# Patient Record
Sex: FEMALE | Race: WHITE | NOT HISPANIC OR LATINO | Employment: FULL TIME | URBAN - METROPOLITAN AREA
[De-identification: names, ages, dates, MRNs, and addresses within clinical notes are randomized per-mention and may not be internally consistent; named-entity substitution may affect disease eponyms.]

---

## 2021-02-04 ENCOUNTER — TELEPHONE (OUTPATIENT)
Dept: NEUROLOGY | Facility: CLINIC | Age: 56
End: 2021-02-04

## 2021-02-05 ENCOUNTER — TELEPHONE (OUTPATIENT)
Dept: NEUROLOGY | Facility: CLINIC | Age: 56
End: 2021-02-05

## 2021-02-09 ENCOUNTER — TELEPHONE (OUTPATIENT)
Dept: NEUROLOGY | Facility: CLINIC | Age: 56
End: 2021-02-09

## 2021-02-10 ENCOUNTER — OFFICE VISIT (OUTPATIENT)
Dept: NEUROLOGY | Facility: CLINIC | Age: 56
End: 2021-02-10
Payer: MEDICAID

## 2021-02-10 VITALS
HEIGHT: 69 IN | RESPIRATION RATE: 20 BRPM | DIASTOLIC BLOOD PRESSURE: 92 MMHG | TEMPERATURE: 97 F | SYSTOLIC BLOOD PRESSURE: 167 MMHG | WEIGHT: 293 LBS | BODY MASS INDEX: 43.4 KG/M2 | HEART RATE: 80 BPM

## 2021-02-10 DIAGNOSIS — R20.2 PARESTHESIA AND PAIN OF BOTH UPPER EXTREMITIES: Primary | ICD-10-CM

## 2021-02-10 DIAGNOSIS — G25.0 ESSENTIAL TREMOR: ICD-10-CM

## 2021-02-10 DIAGNOSIS — M79.601 PARESTHESIA AND PAIN OF BOTH UPPER EXTREMITIES: Primary | ICD-10-CM

## 2021-02-10 DIAGNOSIS — M79.602 PARESTHESIA AND PAIN OF BOTH UPPER EXTREMITIES: Primary | ICD-10-CM

## 2021-02-10 DIAGNOSIS — G89.29 CHRONIC BILATERAL LOW BACK PAIN WITH LEFT-SIDED SCIATICA: ICD-10-CM

## 2021-02-10 DIAGNOSIS — M54.42 CHRONIC BILATERAL LOW BACK PAIN WITH LEFT-SIDED SCIATICA: ICD-10-CM

## 2021-02-10 DIAGNOSIS — Z98.890 HISTORY OF CERVICAL SPINAL SURGERY: ICD-10-CM

## 2021-02-10 DIAGNOSIS — G57.12 MERALGIA PARAESTHETICA, LEFT: ICD-10-CM

## 2021-02-10 PROCEDURE — 99215 OFFICE O/P EST HI 40 MIN: CPT | Mod: PBBFAC,PN | Performed by: NURSE PRACTITIONER

## 2021-02-10 PROCEDURE — 99999 PR PBB SHADOW E&M-EST. PATIENT-LVL V: CPT | Mod: PBBFAC,,, | Performed by: NURSE PRACTITIONER

## 2021-02-10 PROCEDURE — 99417 PROLNG OP E/M EACH 15 MIN: CPT | Mod: S$PBB,,, | Performed by: NURSE PRACTITIONER

## 2021-02-10 PROCEDURE — 99205 PR OFFICE/OUTPT VISIT, NEW, LEVL V, 60-74 MIN: ICD-10-PCS | Mod: S$PBB,,, | Performed by: NURSE PRACTITIONER

## 2021-02-10 PROCEDURE — 99417 PR PROLONGED SVC, OUTPT, W/WO DIRECT PT CONTACT,  EA ADDTL 15 MIN: ICD-10-PCS | Mod: S$PBB,,, | Performed by: NURSE PRACTITIONER

## 2021-02-10 PROCEDURE — 99999 PR PBB SHADOW E&M-EST. PATIENT-LVL V: ICD-10-PCS | Mod: PBBFAC,,, | Performed by: NURSE PRACTITIONER

## 2021-02-10 PROCEDURE — 99205 OFFICE O/P NEW HI 60 MIN: CPT | Mod: S$PBB,,, | Performed by: NURSE PRACTITIONER

## 2021-02-10 RX ORDER — IBUPROFEN 200 MG
600 TABLET ORAL EVERY 8 HOURS PRN
COMMUNITY
End: 2024-03-27

## 2021-02-10 RX ORDER — GABAPENTIN 100 MG/1
100 CAPSULE ORAL 3 TIMES DAILY
Qty: 90 CAPSULE | Refills: 11 | Status: SHIPPED | OUTPATIENT
Start: 2021-02-10 | End: 2023-01-18

## 2021-02-10 RX ORDER — LOSARTAN POTASSIUM 50 MG/1
50 TABLET ORAL
COMMUNITY
Start: 2020-11-30 | End: 2023-05-08

## 2021-02-10 RX ORDER — HYDROCHLOROTHIAZIDE 25 MG/1
25 TABLET ORAL DAILY
COMMUNITY
Start: 2020-11-30 | End: 2023-05-08

## 2021-03-11 ENCOUNTER — TELEPHONE (OUTPATIENT)
Dept: NEUROLOGY | Facility: CLINIC | Age: 56
End: 2021-03-11

## 2021-03-30 ENCOUNTER — PROCEDURE VISIT (OUTPATIENT)
Dept: NEUROLOGY | Facility: CLINIC | Age: 56
End: 2021-03-30
Payer: MEDICAID

## 2021-03-30 DIAGNOSIS — M79.601 PARESTHESIA AND PAIN OF BOTH UPPER EXTREMITIES: ICD-10-CM

## 2021-03-30 DIAGNOSIS — M79.602 PARESTHESIA AND PAIN OF BOTH UPPER EXTREMITIES: ICD-10-CM

## 2021-03-30 DIAGNOSIS — R20.2 PARESTHESIA AND PAIN OF BOTH UPPER EXTREMITIES: ICD-10-CM

## 2021-03-30 PROCEDURE — 95886 MUSC TEST DONE W/N TEST COMP: CPT | Mod: PBBFAC,PO,50 | Performed by: PSYCHIATRY & NEUROLOGY

## 2021-03-30 PROCEDURE — 95886 MUSC TEST DONE W/N TEST COMP: CPT | Mod: 26,S$PBB,, | Performed by: PSYCHIATRY & NEUROLOGY

## 2021-03-30 PROCEDURE — 95910 NRV CNDJ TEST 7-8 STUDIES: CPT | Mod: PBBFAC,PO | Performed by: PSYCHIATRY & NEUROLOGY

## 2021-03-30 PROCEDURE — 95910 PR NERVE CONDUCTION STUDY; 7-8 STUDIES: ICD-10-PCS | Mod: 26,S$PBB,, | Performed by: PSYCHIATRY & NEUROLOGY

## 2021-03-30 PROCEDURE — 95886 PR EMG COMPLETE, W/ NERVE CONDUCTION STUDIES, 5+ MUSCLES: ICD-10-PCS | Mod: 26,S$PBB,, | Performed by: PSYCHIATRY & NEUROLOGY

## 2021-03-30 PROCEDURE — 95910 NRV CNDJ TEST 7-8 STUDIES: CPT | Mod: 26,S$PBB,, | Performed by: PSYCHIATRY & NEUROLOGY

## 2023-01-09 ENCOUNTER — TELEPHONE (OUTPATIENT)
Dept: ORTHOPEDICS | Facility: CLINIC | Age: 58
End: 2023-01-09
Payer: COMMERCIAL

## 2023-01-09 NOTE — TELEPHONE ENCOUNTER
----- Message from Sina Lucas sent at 1/9/2023  3:55 PM CST -----  Regarding: wants to make an appt with [facility], I can not, call pt [phone]  Contact: pt   wants to make an appt with [facility], I can not, call pt [phone]

## 2023-01-10 DIAGNOSIS — M25.561 PAIN IN BOTH KNEES, UNSPECIFIED CHRONICITY: Primary | ICD-10-CM

## 2023-01-10 DIAGNOSIS — M25.562 PAIN IN BOTH KNEES, UNSPECIFIED CHRONICITY: Primary | ICD-10-CM

## 2023-01-18 ENCOUNTER — HOSPITAL ENCOUNTER (OUTPATIENT)
Dept: RADIOLOGY | Facility: HOSPITAL | Age: 58
Discharge: HOME | End: 2023-01-18
Attending: ORTHOPAEDIC SURGERY
Payer: COMMERCIAL

## 2023-01-18 ENCOUNTER — OFFICE VISIT (OUTPATIENT)
Dept: ORTHOPEDICS | Facility: CLINIC | Age: 58
End: 2023-01-18
Payer: MEDICAID

## 2023-01-18 VITALS — BODY MASS INDEX: 43.4 KG/M2 | HEIGHT: 69 IN | WEIGHT: 293 LBS

## 2023-01-18 DIAGNOSIS — M25.561 PAIN IN BOTH KNEES, UNSPECIFIED CHRONICITY: Primary | ICD-10-CM

## 2023-01-18 DIAGNOSIS — M25.562 PAIN IN BOTH KNEES, UNSPECIFIED CHRONICITY: Primary | ICD-10-CM

## 2023-01-18 DIAGNOSIS — M25.561 PAIN IN BOTH KNEES, UNSPECIFIED CHRONICITY: ICD-10-CM

## 2023-01-18 DIAGNOSIS — M17.10 ARTHRITIS OF KNEE: ICD-10-CM

## 2023-01-18 DIAGNOSIS — M25.562 PAIN IN BOTH KNEES, UNSPECIFIED CHRONICITY: ICD-10-CM

## 2023-01-18 PROCEDURE — 99204 OFFICE O/P NEW MOD 45 MIN: CPT | Mod: 25,S$PBB,, | Performed by: ORTHOPAEDIC SURGERY

## 2023-01-18 PROCEDURE — 99999 PR PBB SHADOW E&M-EST. PATIENT-LVL III: ICD-10-PCS | Mod: PBBFAC,,, | Performed by: ORTHOPAEDIC SURGERY

## 2023-01-18 PROCEDURE — 99999 PR PBB SHADOW E&M-EST. PATIENT-LVL III: CPT | Mod: PBBFAC,,, | Performed by: ORTHOPAEDIC SURGERY

## 2023-01-18 PROCEDURE — 99204 PR OFFICE/OUTPT VISIT, NEW, LEVL IV, 45-59 MIN: ICD-10-PCS | Mod: 25,S$PBB,, | Performed by: ORTHOPAEDIC SURGERY

## 2023-01-18 PROCEDURE — 73564 X-RAY EXAM KNEE 4 OR MORE: CPT | Mod: 26,50,, | Performed by: RADIOLOGY

## 2023-01-18 PROCEDURE — 20610 DRAIN/INJ JOINT/BURSA W/O US: CPT | Mod: 50,PBBFAC,PN | Performed by: ORTHOPAEDIC SURGERY

## 2023-01-18 PROCEDURE — 73564 X-RAY EXAM KNEE 4 OR MORE: CPT | Mod: TC,50,PO

## 2023-01-18 PROCEDURE — 73564 XR KNEE ORTHO BILAT WITH FLEXION: ICD-10-PCS | Mod: 26,50,, | Performed by: RADIOLOGY

## 2023-01-18 PROCEDURE — 20610 LARGE JOINT ASPIRATION/INJECTION: BILATERAL KNEE: ICD-10-PCS | Mod: 50,S$PBB,, | Performed by: ORTHOPAEDIC SURGERY

## 2023-01-18 PROCEDURE — 99213 OFFICE O/P EST LOW 20 MIN: CPT | Mod: PBBFAC,PN,25 | Performed by: ORTHOPAEDIC SURGERY

## 2023-01-18 RX ORDER — TRIAMCINOLONE ACETONIDE 40 MG/ML
40 INJECTION, SUSPENSION INTRA-ARTICULAR; INTRAMUSCULAR
Status: DISCONTINUED | OUTPATIENT
Start: 2023-01-18 | End: 2023-01-18 | Stop reason: HOSPADM

## 2023-01-18 RX ORDER — IBUPROFEN 800 MG/1
800 TABLET ORAL
Qty: 30 TABLET | Refills: 0 | Status: SHIPPED | OUTPATIENT
Start: 2023-01-18 | End: 2023-01-18 | Stop reason: SDUPTHER

## 2023-01-18 RX ADMIN — TRIAMCINOLONE ACETONIDE 40 MG: 40 INJECTION, SUSPENSION INTRA-ARTICULAR; INTRAMUSCULAR at 03:01

## 2023-01-18 NOTE — PROGRESS NOTES
Past Medical History:   Diagnosis Date    Chronic bilateral low back pain with left-sided sciatica 2/10/2021    Essential tremor 2/10/2021    Heart disease     Hypertension     Obesity        Past Surgical History:   Procedure Laterality Date    ESOPHAGEAL DILATION N/A 5/20/2022    Procedure: DILATION, ESOPHAGUS;  Surgeon: Jose Aguilera MD;  Location: [facility];  Service: Endoscopy;  Laterality: N/A;  Bates    ESOPHAGOGASTRODUODENOSCOPY N/A 5/20/2022    Procedure: EGD (ESOPHAGOGASTRODUODENOSCOPY);  Surgeon: Jose Aguilera MD;  Location: [facility];  Service: Endoscopy;  Laterality: N/A;       Current Outpatient Medications   Medication Sig    hydroCHLOROthiazide (HYDRODIURIL) 25 MG tablet hydrochlorothiazide 25 mg tablet    ibuprofen (ADVIL,MOTRIN) 200 MG tablet Take 600 mg by mouth every 8 (eight) hours as needed.    losartan (COZAAR) 50 MG tablet Take 50 mg by mouth.    traMADoL (ULTRAM) 50 mg tablet Take 50 mg by mouth every 6 (six) hours.     No current facility-administered medications for this visit.       Review of patient's allergies indicates:  No Known Allergies    Family History   Problem Relation Age of Onset    Heart disease Father     Tremor Father     Cancer Maternal Grandmother        Social History     Socioeconomic History    Marital status:    Tobacco Use    Smoking status: Never   Substance and Sexual Activity    Alcohol use: Never    Drug use: Never       Chief Complaint:   Chief Complaint   Patient presents with    Knee Pain    Initial Visit       History of present illness:  This is a 57-year-old female seen for chronic bilateral knee pain.  Left greater than right.  Patient has had pain for 3-4 years now.  Patient has been taking ibuprofen as well as getting periodic injections.  Patient just recently switched doctors.  She is had both cortisone as well as viscosupplementation.  Viscosupplementation does not help.  Pain is a 9/10.      Review of Systems:    Constitution:  Negative for chills, fever, and sweats.  Negative for unexplained weight loss.    HENT:  Negative for headaches and blurry vision.    Cardiovascular:Negative for chest pain or irregular heart beat. Negative for hypertension.    Respiratory:  Negative for cough and shortness of breath.    Gastrointestinal: Negative for abdominal pain, heartburn, melena, nausea, and vomitting.    Genitourinary:  Negative bladder incontinence and dysuria.    Musculoskeletal:  See HPI    Neurological: Negative for numbness.    Psychiatric/Behavioral: Negative for depression.  The patient is not nervous/anxious.      Endocrine: Negative for polyuria    Hematologic/Lymphatic: Negative for bleeding problem.  Does not bruise/bleed easily.    Skin: Negative for poor would healing and rash      Physical Examination:    Vital Signs:  There were no vitals filed for this visit.    Body mass index is 54.27 kg/m².    This a well-developed, well nourished patient in no acute distress.  They are alert and oriented and cooperative to examination.  Pt. walks without an antalgic gait.      Examination of bilateral knees shows no rashes or erythema. There are no masses ecchymosis or effusion. Patient has full range of motion from 0-130°. Patient is nontender to palpation over lateral joint line and moderately tender to palpation over the medial joint line. Patient has a - Lachman exam, - anterior drawer exam, and - posterior drawer exam. - Lalitha's exam. Knee is stable to varus and valgus stress. 5 out of 5 motor strength. Palpable distal pulses. Intact light touch sensation. Negative Patellofemoral crepitus      X-rays:  X-rays of both knees are ordered and reviewed which show severe medial joint space narrowing bilaterally     Assessment::  Severe bilateral varus knee arthritis    Plan:  Reviewed the findings with her today.  Reviewed the x-rays.  We discussed treatment options.  I gave the patient an 800 milligram ibuprofen prescription.  I agreed  to do cortisone injection again today.  We will also get authorization for Zilretta to do in 3 months.  Talked about weight loss, nutrition as well as knee replacement.    This note was created using [employer] voice recognition software that occasionally misinterpreted phrases or words.    Consult note is delivered via Epic messaging service.

## 2023-01-18 NOTE — PROCEDURES
Large Joint Aspiration/Injection: bilateral knee    Date/Time: 1/18/2023 3:15 PM  Performed by: Brian Austin MD  Authorized by: Brian Austin MD     Consent Done?:  Yes (Verbal)  Indications:  Pain  Site marked: the procedure site was marked    Timeout: prior to procedure the correct patient, procedure, and site was verified    Prep: patient was prepped and draped in usual sterile fashion      Local anesthesia used?: Yes    Anesthesia:  Local infiltration  Local anesthetic:  Bupivacaine 0.25% without epinephrine and lidocaine 2% without epinephrine  Anesthetic total (ml):  6      Details:  Needle Size:  22 G  Ultrasonic Guidance for needle placement?: No    Approach:  Anterolateral  Location:  Knee  Laterality:  Bilateral  Site:  Bilateral knee  Medications (Right):  40 mg triamcinolone acetonide 40 mg/mL  Medications (Left):  40 mg triamcinolone acetonide 40 mg/mL  Patient tolerance:  Patient tolerated the procedure well with no immediate complications

## 2023-03-15 ENCOUNTER — OFFICE VISIT (OUTPATIENT)
Dept: NEUROLOGY | Facility: CLINIC | Age: 58
End: 2023-03-15
Payer: COMMERCIAL

## 2023-03-15 VITALS
SYSTOLIC BLOOD PRESSURE: 141 MMHG | HEIGHT: 69 IN | WEIGHT: 293 LBS | HEART RATE: 70 BPM | DIASTOLIC BLOOD PRESSURE: 80 MMHG | BODY MASS INDEX: 43.4 KG/M2

## 2023-03-15 DIAGNOSIS — M54.14 THORACIC RADICULOPATHY: ICD-10-CM

## 2023-03-15 DIAGNOSIS — G56.03 CARPAL TUNNEL SYNDROME ON BOTH SIDES: Primary | ICD-10-CM

## 2023-03-15 DIAGNOSIS — M54.2 CERVICALGIA: ICD-10-CM

## 2023-03-15 DIAGNOSIS — M54.12 CERVICAL RADICULOPATHY: ICD-10-CM

## 2023-03-15 PROCEDURE — 99214 PR OFFICE/OUTPT VISIT, EST, LEVL IV, 30-39 MIN: ICD-10-PCS | Mod: S$GLB,,, | Performed by: PSYCHIATRY & NEUROLOGY

## 2023-03-15 PROCEDURE — 99214 OFFICE O/P EST MOD 30 MIN: CPT | Mod: S$GLB,,, | Performed by: PSYCHIATRY & NEUROLOGY

## 2023-03-15 PROCEDURE — 99999 PR PBB SHADOW E&M-EST. PATIENT-LVL IV: CPT | Mod: PBBFAC,,, | Performed by: PSYCHIATRY & NEUROLOGY

## 2023-03-15 PROCEDURE — 99999 PR PBB SHADOW E&M-EST. PATIENT-LVL IV: ICD-10-PCS | Mod: PBBFAC,,, | Performed by: PSYCHIATRY & NEUROLOGY

## 2023-03-15 RX ORDER — ERGOCALCIFEROL 1.25 MG/1
50000 CAPSULE ORAL
COMMUNITY

## 2023-03-15 NOTE — PROGRESS NOTES
[facility] NEUROLOGY  [facility], [place]    Date: 3/15/23  Patient Name: Mignon Hardy   MRN: [id number]   PCP: Wagner Rondon  Referring Provider: No ref. provider found    Chief Complaint:  Tremor, hand paresthesias, neck pain  Subjective:   This patient has been evaluated by [facility] neurology in the past.  Extensive chart review was conducted prior to today's encounter.     HPI:   Ms. Mignon Hardy is a 57 y.o. female presenting to establish care for carpal tunnel syndrome, tremor, paresthesias, and radicular pain.  She shares that uncomfortable numbness/tingling occurs daily in the bilateral hands.  Has a standing diagnosis of severe carpal tunnel syndrome on the left and moderate carpal tunnel syndrome on the right per EMG/NCV completed in 2021.  Patient is not currently using wrist braces.  No injection or surgical history.    Also found on EMG/NCV in 2021, the patient has cervical radiculopathy.  She notes that over the last couple of years, she is had more radiating pains from the neck into the shoulders.  Tingling electric sensations.  No injections or other interventions in the past.    Recently started having positional dependent radicular symptoms between the shoulder blades.  No symptoms present in midline but significant rotation brings on discomfort.    No physical therapy in the past.    Followed by Orthopedics for bilateral knee complaints.  Patient expressed that she is supposed to be losing weight so that she can undergo knee surgery.    PAST MEDICAL HISTORY:  Past Medical History:   Diagnosis Date    Chronic bilateral low back pain with left-sided sciatica 2/10/2021    Essential tremor 2/10/2021    Heart disease     Hypertension     Obesity        PAST SURGICAL HISTORY:  Past Surgical History:   Procedure Laterality Date    ESOPHAGEAL DILATION N/A 5/20/2022    Procedure: DILATION, ESOPHAGUS;  Surgeon: Jose Aguilera MD;  Location: [facility];  Service: Endoscopy;   Laterality: N/A;  Bates    ESOPHAGOGASTRODUODENOSCOPY N/A 5/20/2022    Procedure: EGD (ESOPHAGOGASTRODUODENOSCOPY);  Surgeon: Jose Aguilera MD;  Location: [facility];  Service: Endoscopy;  Laterality: N/A;       CURRENT MEDS:  Current Outpatient Medications   Medication Sig Dispense Refill    ergocalciferol (VITAMIN D2) 50,000 unit Cap Take 50,000 Units by mouth as needed.      hydroCHLOROthiazide (HYDRODIURIL) 25 MG tablet hydrochlorothiazide 25 mg tablet      ibuprofen (ADVIL,MOTRIN) 200 MG tablet Take 600 mg by mouth every 8 (eight) hours as needed.      losartan (COZAAR) 50 MG tablet Take 50 mg by mouth. Patient needs refill.      ibuprofen (ADVIL,MOTRIN) 800 MG tablet Take 1 tablet (800 mg total) by mouth as needed for Pain. (Patient not taking: Reported on 3/15/2023) 30 tablet 0    traMADoL (ULTRAM) 50 mg tablet Take 50 mg by mouth every 6 (six) hours.       No current facility-administered medications for this visit.       ALLERGIES:  Review of patient's allergies indicates:  No Known Allergies    FAMILY HISTORY:  Family History   Problem Relation Age of Onset    Heart disease Father     Tremor Father     Cancer Maternal Grandmother        SOCIAL HISTORY:  Social History     Tobacco Use    Smoking status: Never   Substance Use Topics    Alcohol use: Never    Drug use: Never       Review of Systems:  Gen: no fever, no chills, no generalized feeling of weakness   HEENT: no double vision, no blurred vision, no eye pain, no eye exudates. no nasal congestion,   no traumatic injury of head, no neck pain, no neck stiffness. no photophobia or phonophobia at this time. ?    Heart: no chest pain, no SOB    Lungs: no SOB, no cough    MSK: no weakness of legs, intact ROM    ABD: no abd pain, no N/V/D/C, no difficulty with defecation.    Extremities: No leg pain, no edema.       Objective:     Vitals:    03/15/23 1428   BP: (!) 141/80   BP Location: Left arm   Patient Position: Sitting   Pulse: 70   Weight: (!)  "166.7 kg (367 lb 8.1 oz)   Height: 5' 9" (1.753 m)       General: female in NAD, alert and awake, Aox3, well groomed. ?    ? ?    HEENT: Head is NC/AT EOMI, pupil size: 4 mm B/L, no nystagmus noted; hearing grossly intact b/l. Mucous membrane moist, uvula midline, no pharyngeal erythema, exudates or discharges.      Neck: Supple. no nuchal rigidity.      Cardiovascular: well perfused, no cyanosis        Respiratory: Symmetric chest rise noted       Musculoskeletal: Muscle tone noted to be adequate for patient age, muscle mass is reduced at bilateral thenar eminence. No spontaneous movements or fasciculations noted during this examination.    No resting tremors     Extremities: No pedal edema or calf tenderness. No cogwheel rigidity noted on B/L UE extremities.      Postural tremor present in the bilateral upper extremities.    (+) Tinel sign bilaterally     Neurological Examination.    Mental status: AA&O x3; Affect/mood is euthymic/congruent; no aphasia noted during examination. Patient answers simple questions appropriately & follows simple commands; no dysarthria or expressive aphasia; no yuko-neglect or extinction. Vocabulary/word finding: excellent.       Cranial Nerves: II-XII grossly intact. visual fields intact to confrontation, EOMI, no nystagmus, PERRLA,?facial muscles symmetric and no facial droop noted, patient hearing is grossly intact b/l, ?facial sensation to sharp and light touch grossly intact B/L. Patient smiles, frowns, closes eyes ?forcefully uneventfully. Uvula midline and no difficulty with pronunciation. No SCM/trapezius/muscle of mastication weakness noted B/L. Patient has adequate control of tongue and may protrude it and move it adequately.       Muscle Function: Tone WNL.  4+/5 bilateral  strength, 4+/5 bilateral hip flexors, otherwise 5/5 throughout      Sensory:  Intact to light touch throughout     Reflexes: Left and Right biceps, triceps, brachioradialis are 2+/4. patellar 2+/4 on " Left and 2+/4 on Right, B/L Achilles 2+/4     Coordination: no dysmetria (finger to nose negative)     Gait:  Mildly slowed due to pain and habitus.  Stable without the use of cane or walker.  Slight limp presumably due to chronic knee pain.    Assessment:   Mignon Hardy is a 57 y.o. female presenting for evaluation of carpal tunnel syndrome and radicular pains.  Patient was counseled on the importance of starting nighttime use of wrist braces given her history of EMG/NCV established carpal tunnel syndrome rated severe on the left and moderate on the right.  We will also initiate orthopedics referral for consideration of injections.    Referral to pain clinic initiated for history of known cervical radiculopathy with exacerbation in symptoms.  We also discussed physical therapy, but the patient deferred this option and would like to meet with pain clinic 1st.    Plan:     Problem List Items Addressed This Visit    None  Visit Diagnoses       Carpal tunnel syndrome on both sides    -  Primary    Relevant Orders    Ambulatory referral/consult to Orthopedics    Cervical radiculopathy        Relevant Orders    Ambulatory consult to Pain Clinic    Cervicalgia        Relevant Orders    Ambulatory consult to Pain Clinic    Thoracic radiculopathy        Relevant Orders    Ambulatory consult to Pain Clinic          - orthopedics for carpal tunnel   - pain referral for radicular symptoms  - recommend nighttime wrist braces  - PT offered and deferred by patient  - follow-up with our clinic in the future as needed    I spent a total of 30 minutes on the day of the visit. This includes face to face time and non-face to face time preparing to see the patient (eg, review of tests), obtaining and/or reviewing separately obtained history, documenting clinical information in the electronic or other health record, independently interpreting results and communicating results to the patient/family/caregiver, or care  coordinator.    A dictation device was used to produce this document. Use of such devices sometimes results in grammatical errors or replacement of words that sound similarly.    Julio Gee, DO

## 2023-03-20 ENCOUNTER — OFFICE VISIT (OUTPATIENT)
Dept: ORTHOPEDICS | Facility: CLINIC | Age: 58
End: 2023-03-20
Payer: COMMERCIAL

## 2023-03-20 DIAGNOSIS — G56.03 CARPAL TUNNEL SYNDROME ON BOTH SIDES: Primary | ICD-10-CM

## 2023-03-20 PROCEDURE — 99204 OFFICE O/P NEW MOD 45 MIN: CPT | Mod: S$GLB,,, | Performed by: PHYSICIAN ASSISTANT

## 2023-03-20 PROCEDURE — 99999 PR PBB SHADOW E&M-EST. PATIENT-LVL III: ICD-10-PCS | Mod: PBBFAC,,, | Performed by: PHYSICIAN ASSISTANT

## 2023-03-20 PROCEDURE — 99999 PR PBB SHADOW E&M-EST. PATIENT-LVL III: CPT | Mod: PBBFAC,,, | Performed by: PHYSICIAN ASSISTANT

## 2023-03-20 PROCEDURE — 99204 PR OFFICE/OUTPT VISIT, NEW, LEVL IV, 45-59 MIN: ICD-10-PCS | Mod: S$GLB,,, | Performed by: PHYSICIAN ASSISTANT

## 2023-03-20 RX ORDER — MELOXICAM 15 MG/1
15 TABLET ORAL DAILY
Qty: 28 TABLET | Refills: 0 | Status: SHIPPED | OUTPATIENT
Start: 2023-03-20 | End: 2023-07-05

## 2023-03-20 NOTE — PROGRESS NOTES
3/20/2023    Chief Complaint:  Chief Complaint   Patient presents with    Right Wrist - Pain    Left Wrist - Pain        HPI:  Mignon Hardy is a 57 y.o. female who presents to clinic today for evaluation of her bilateral hand numbness, tingling, and weakness.  States he is also here for evaluation of her right hand tremors.  States symptoms have been present for over a year.  States the right is worse than the left.  States pain can reach up to 6/10.  Denies any previous medical treatment.  Denies any acute injuries.  States he does have nocturnal symptoms and trouble sleeping.  Denies any other complaints at this time.    PMHX:  Past Medical History:   Diagnosis Date    Chronic bilateral low back pain with left-sided sciatica 2/10/2021    Essential tremor 2/10/2021    Heart disease     Hypertension     Obesity        PSHX:  Past Surgical History:   Procedure Laterality Date    ESOPHAGEAL DILATION N/A 5/20/2022    Procedure: DILATION, ESOPHAGUS;  Surgeon: Jose Aguilera MD;  Location: [facility];  Service: Endoscopy;  Laterality: N/A;  Bates    ESOPHAGOGASTRODUODENOSCOPY N/A 5/20/2022    Procedure: EGD (ESOPHAGOGASTRODUODENOSCOPY);  Surgeon: Jose Aguilera MD;  Location: [facility];  Service: Endoscopy;  Laterality: N/A;       FMHX:  Family History   Problem Relation Age of Onset    Heart disease Father     Tremor Father     Cancer Maternal Grandmother        SOCHX:  Social History     Tobacco Use    Smoking status: Never    Smokeless tobacco: Not on file   Substance Use Topics    Alcohol use: Never       ALLERGIES:  Patient has no known allergies.    CURRENT MEDICATIONS:  Current Outpatient Medications on File Prior to Visit   Medication Sig Dispense Refill    ergocalciferol (VITAMIN D2) 50,000 unit Cap Take 50,000 Units by mouth as needed.      hydroCHLOROthiazide (HYDRODIURIL) 25 MG tablet hydrochlorothiazide 25 mg tablet      ibuprofen (ADVIL,MOTRIN) 200 MG tablet Take 600 mg by mouth every 8  (eight) hours as needed.      ibuprofen (ADVIL,MOTRIN) 800 MG tablet Take 1 tablet (800 mg total) by mouth as needed for Pain. (Patient not taking: Reported on 3/15/2023) 30 tablet 0    losartan (COZAAR) 50 MG tablet Take 50 mg by mouth. Patient needs refill.      traMADoL (ULTRAM) 50 mg tablet Take 50 mg by mouth every 6 (six) hours.       No current facility-administered medications on file prior to visit.       REVIEW OF SYSTEMS:  Review of Systems   Constitutional:  Negative for fever.   HENT:  Negative for ear pain, hearing loss and nosebleeds.    Eyes:  Negative for pain and redness.   Respiratory:  Positive for shortness of breath. Negative for cough.    Cardiovascular:  Negative for chest pain and palpitations.   Gastrointestinal:  Negative for constipation, diarrhea and vomiting.   Genitourinary:  Positive for frequency. Negative for dysuria and hematuria.   Musculoskeletal:  Positive for back pain. Negative for myalgias.   Skin:  Negative for itching and rash.   Neurological:  Positive for tingling. Negative for seizures and headaches.   Psychiatric/Behavioral:  The patient is not nervous/anxious.      Review of Systems Compete; Negative, unless noted above.    GENERAL PHYSICAL EXAM:   There were no vitals taken for this visit.   GEN: well developed, well nourished, no acute distress   HENT: Normocephalic, atraumatic   EYES: No discharge, conjunctiva normal   PULM: No wheezing, no respiratory distress   CV: Regular rate and rhythm     ORTHO EXAM:   Examination of the right wrist/hand reveals no edema, erythema, ecchymosis, or skin breakdown.  Presence of a mild tremor at rest.  Able make composite fist and fully extend all fingers.  Full intact range of motion of the right wrist.  5/5 /intrinsic strength.  5/5 thenar strength.  5/5 hypothenar strength.  5/5 thumb adduction strength.  Positive carpal Tinel's test.  Positive Durkan's test.  Negative cubital Tinel's test.  Normal sensation in the radial,  ulnar, median nerve distributions.  Capillary refill less than 2 seconds in all fingers.   Examination of the left wrist/hand reveals no edema, erythema, ecchymosis, or skin breakdown.  Able make composite fist and fully extend all fingers.  Full intact range of motion of the left wrist.  5/5 /intrinsic strength.  5/5 thenar strength.  5/5 hypothenar strength.  5/5 thumb adduction strength.  Mildly positive carpal Tinel's test.  Mildly positive Durkan's test.  Negative cubital Tinel's test.  Normal sensation in the radial, ulnar, median nerve distributions.  Capillary refill less than 2 seconds in all fingers.    RADIOLOGY:   None.    ASSESSMENT:   Bilateral carpal tunnel syndrome, right hand tremor    PLAN:  1. I discussed with Mignon Hardy the carpal tunnel syndrome and hand tremor pathology and treatment options in detail during today's visit.  We did discuss that it is unlikely that the hand tremor is related to the carpal tunnel syndrome.  We did discuss the best course of action this time is to perform an EMG nerve conduction study bilateral upper extremities to further evaluate the severity of her bilateral carpal tunnel syndrome.  We also discussed performing nighttime splinting via bilateral carpal tunnel splints.  She verbally agreed with the treatment plan.    2.  She was provided bilateral carpal tunnel splints in clinic today.  She was instructed to wear them every night until seen again in clinic.      3. She was referred to [facility] to have an EMG nerve conduction study performed of the bilateral upper extremities.      4. I would like her follow up in clinic after the EMG to discuss the results.  She was instructed to contact the clinic for any problems or concerns in the interim.    Answers submitted by the patient for this visit:  Orthopedics Questionnaire (Submitted on 3/18/2023)  unexpected weight change: No  appetite change : No  sleep disturbance: Yes  IMMUNOCOMPROMISED:  No  dysphoric mood: Yes  visual disturbance: Yes  sinus pressure : No  difficulty urinating: No  joint swelling: Yes   (Submitted on 3/18/2023)  Chief Complaint: Hand injury  Pain Chronicity: chronic  History of trauma: No  Onset: more than 1 year ago  Frequency: constantly  Progression since onset: unchanged  injury location: at home  pain- numeric: 8/10  pain location: left hand, right hand, left knee, right knee, left ankle, right ankle, left foot  pain quality: aching, sharp, burning, shooting, throbbing, tingling  Radiating Pain: Yes  Aggravating factors: activity, bearing weight, walking, sitting  inability to bear weight: Yes  joint locking: Yes  limited range of motion: Yes  stiffness: Yes  Treatments tried: movement, NSAIDs, OTC ointments, OTC pain meds  physical therapy: not tried  Improvement on treatment: no relief

## 2023-03-21 ENCOUNTER — OFFICE VISIT (OUTPATIENT)
Dept: PAIN MEDICINE | Facility: CLINIC | Age: 58
End: 2023-03-21
Payer: COMMERCIAL

## 2023-03-21 VITALS
DIASTOLIC BLOOD PRESSURE: 90 MMHG | HEART RATE: 80 BPM | SYSTOLIC BLOOD PRESSURE: 144 MMHG | BODY MASS INDEX: 54.27 KG/M2 | WEIGHT: 293 LBS

## 2023-03-21 DIAGNOSIS — G89.4 CHRONIC PAIN SYNDROME: ICD-10-CM

## 2023-03-21 DIAGNOSIS — M54.12 CERVICAL RADICULOPATHY: ICD-10-CM

## 2023-03-21 DIAGNOSIS — M54.2 CERVICALGIA: Primary | ICD-10-CM

## 2023-03-21 PROCEDURE — 99204 OFFICE O/P NEW MOD 45 MIN: CPT | Mod: S$GLB,,, | Performed by: STUDENT IN AN ORGANIZED HEALTH CARE EDUCATION/TRAINING PROGRAM

## 2023-03-21 PROCEDURE — 99999 PR PBB SHADOW E&M-EST. PATIENT-LVL III: CPT | Mod: PBBFAC,,, | Performed by: STUDENT IN AN ORGANIZED HEALTH CARE EDUCATION/TRAINING PROGRAM

## 2023-03-21 PROCEDURE — 99999 PR PBB SHADOW E&M-EST. PATIENT-LVL III: ICD-10-PCS | Mod: PBBFAC,,, | Performed by: STUDENT IN AN ORGANIZED HEALTH CARE EDUCATION/TRAINING PROGRAM

## 2023-03-21 PROCEDURE — 99204 PR OFFICE/OUTPT VISIT, NEW, LEVL IV, 45-59 MIN: ICD-10-PCS | Mod: S$GLB,,, | Performed by: STUDENT IN AN ORGANIZED HEALTH CARE EDUCATION/TRAINING PROGRAM

## 2023-03-21 RX ORDER — PREGABALIN 50 MG/1
50 CAPSULE ORAL 3 TIMES DAILY
Qty: 90 CAPSULE | Refills: 1 | Status: SHIPPED | OUTPATIENT
Start: 2023-03-21 | End: 2023-05-30

## 2023-03-21 NOTE — PROGRESS NOTES
[facility] New Patient Evaluation    Referred by: Dr. Julio Gee   Reason for referral: Cervical radiculopathy  Cervicalgia  Thoracic radiculopathy     CC: No chief complaint on file.    Last 3 PDI Scores 3/21/2023   Pain Disability Index (PDI) 40       Subjective:   Mignon Hardy is a 57 y.o. female who presents complaining of neck pain that radiates into the left shoulder and arm x 1 year.  She reports undergoing a cervical spine surgery in 1996 however she is unsure of the details of the surgery or what was done.  An EMG from 03/31/2021 showed bilateral chronic to subacute C6/7 radiculopathy without active denervation.  We do not have any imaging of her neck.  She denies any weakness in the upper extremities.      Location: neck    Onset: one year ago   Current Pain Score: 7/10  Daily Pain of Range: 7-8/10  Quality: Aching, Burning, Throbbing, Grabbing, Tight, Tingling, Deep, Sharp, and Electric  Radiation: left shoulder and back  Worsened by: sitting, standing, and daily activity   Improved by: medications, rest, and sitting    Patient denies night fever/night sweats, urinary incontinence, bowel incontinence, significant weight loss, significant motor weakness, and loss of sensations.    Previous Interventions:  - None    Previous Therapies:  PT/OT: no   Chiropractor:   HEP:   Relevant Surgery: Pt reports undergoing cervical spine surgery in 1996 at the time - She is unsure what she had done.  Previous Medications:   - NSAIDS:  Meloxicam 15 mg  - Muscle Relaxants:    - TCAs:   - SNRIs:   - Topicals:   - Anticonvulsants:    - Opioids:  Tramadol  - Adjuvants:     Current Pain Medications:  Meloxicam 15 mg QD    Review of Systems:  ROS    GENERAL:  No weight loss, malaise or fevers. +Obesity  HEENT:   No recent changes in vision or hearing  NECK:  No difficulty with swallowing. No stridor.   RESPIRATORY:  Negative for cough, wheezing or shortness of breath, patient denies any recent  URI.  CARDIOVASCULAR:  Negative for chest pain, leg swelling or palpitations.  GI:  Negative for abdominal discomfort, blood in stools or black stools or change in bowel habits.  MUSCULOSKELETAL:  See HPI.  SKIN:  Negative for lesions, rash, and itching.  PSYCH:  No mood disorder or recent psychosocial stressors.    HEMATOLOGY/LYMPHOLOGY:  Negative for prolonged bleeding, bruising easily or swollen nodes.  Patient is not currently taking any anti-coagulants  NEURO:   No history of headaches, syncope, paralysis, seizures or tremors.  All other reviewed and negative other than HPI.    History:  Current medications, allergies, medical history, surgical history,   family history, and social history were reviewed in the chart as marked.    Full Medication List:    Current Outpatient Medications:     ergocalciferol (ERGOCALCIFEROL) 50,000 unit Cap, Take 50,000 Units by mouth as needed., Disp: , Rfl:     hydroCHLOROthiazide (HYDRODIURIL) 25 MG tablet, hydrochlorothiazide 25 mg tablet, Disp: , Rfl:     ibuprofen (ADVIL,MOTRIN) 200 MG tablet, Take 600 mg by mouth every 8 (eight) hours as needed., Disp: , Rfl:     ibuprofen (ADVIL,MOTRIN) 800 MG tablet, Take 1 tablet (800 mg total) by mouth as needed for Pain., Disp: 30 tablet, Rfl: 0    meloxicam (MOBIC) 15 MG tablet, Take 1 tablet (15 mg total) by mouth once daily., Disp: 28 tablet, Rfl: 0    traMADoL (ULTRAM) 50 mg tablet, Take 50 mg by mouth every 6 (six) hours., Disp: , Rfl:     losartan (COZAAR) 50 MG tablet, Take 50 mg by mouth. Patient needs refill., Disp: , Rfl:      Allergies:  Patient has no known allergies.     Medical History:   has a past medical history of Chronic bilateral low back pain with left-sided sciatica (2/10/2021), Essential tremor (2/10/2021), Heart disease, Hypertension, and Obesity.    Surgical History:   has a past surgical history that includes Esophagogastroduodenoscopy (N/A, 5/20/2022) and Esophageal dilation (N/A, 5/20/2022).    Family  History:  family history includes Cancer in her maternal grandmother; Heart disease in her father; Tremor in her father.    Social History:   reports that she has never smoked. She does not have any smokeless tobacco history on file. She reports that she does not drink alcohol and does not use drugs.    Physical Exam:  Vitals:    03/21/23 1434   BP: (!) 144/90   Pulse: 80   Weight: (!) 166.7 kg (367 lb 8.1 oz)   PainSc:   8   PainLoc: Neck       GENERAL: Well appearing, in no acute distress, alert and oriented x3.  PSYCH:  Mood and affect appropriate.  SKIN: Skin color, texture, turgor normal, no rashes or lesions.  HEAD/FACE:  Normocephalic, atraumatic. Cranial nerves grossly intact.  NECK: Normal ROM. Supple.  There is no significant pain to palpation over the cervical paraspinal muscles.  No increased pain with facet loading.  Spurling test negative.  Mild decrease in neck flexion and extension due to pain.  CV: RRR with palpation of the radial artery.  PULM: No evidence of respiratory difficulty, symmetric chest rise.  GI:  Soft and non-distended.  MSK: Peripheral joint ROM is full and pain free without obvious instability or laxity in all four extremities with ambulation. No deformities, edema, or skin discoloration.  No atrophy or tone abnormalities are noted.   NEURO: Bilateral upper and lower extremity coordination and strength is symmetric.  No loss of sensation is noted in the bilateral upper extremities.  MENTAL STATUS: A x O x 3, good concentration, speech is fluent and goal directed  MOTOR: 5/5 in all muscle groups  GAIT: normal. Ambulates unassisted.    Imaging:  none      EMG W/ ULTRASOUND AND NERVE CONDUCTION TEST 2 Extremities  3/31/2021  Summary: Left upper extremity nerve conduction studies show absent median sensory response. There is prolonged latency and low amplitude of the median monitor response. Left upper extremity needle examination shows neurogenic changes in the C6/7 innervated muscles  and the opponens pollicis muscle. No fibrillation potentials were seen.      Right upper extremity nerve conduction studies show prolonged latency, low amplitude, and slowed conduction velocity of the median sensory response. There is prolonged latency of the median monitor response. Right upper extremity needle examination shows neurogenic changes in the C6/7 innervated muscles. No fibrillation potentials were seen.      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        Labs:  BMP  Lab Results   Component Value Date     01/03/2008    K 4.5 01/03/2008     01/03/2008    CO2 25 01/03/2008    BUN 10 01/03/2008    CREATININE 0.7 01/03/2008    CALCIUM 9.2 01/03/2008     Lab Results   Component Value Date    ALT 10 01/03/2008    AST 12 01/03/2008    ALKPHOS 80 01/03/2008    BILITOT 0.5 01/03/2008     Lab Results   Component Value Date    WBC 7.35 01/03/2008    HGB 12.0 01/03/2008    HCT 38.0 01/03/2008    MCV 90.0 01/03/2008     (H) 01/03/2008       Assessment:  Problem List Items Addressed This Visit    None  Visit Diagnoses       Cervical radiculopathy        Cervicalgia        Thoracic radiculopathy              Mignon Hardy is a 57 y.o. female who  has a past medical history of Chronic bilateral low back pain with left-sided sciatica (2/10/2021), Essential tremor (2/10/2021), Heart disease, Hypertension, and Obesity.  By history and examination this patient has chronicneck pain with left radiculopathy in the distribution of C6 and C7 at noted on EMG.  I will order an MRI of the cervical spine to confirm pathology.  We discussed the underlying diagnoses and multiple treatment options including non-opioid medications, interventional procedures, physical therapy, home exercise, and weight loss.  The risks and benefits  of each treatment option were discussed and all questions were answered.        Treatment Plan:   Procedures:  Patient may benefit from a cervical epidural steroid injection.  I will order MRI of the cervical spine prior to proceeding with any injections.  PT/OT/HEP:  I have referred the patient to physical therapy for evaluation and treatment of her neck pain.  I have stressed the importance of physical activity and a home exercise plan to help with pain and improve health.  Discussed weight loss as well.  Medications:    - I have prescribed Lyrica 50 mg t.i.d..  The patient will start with 50 mg Lyrica q.h.s. and titrate up as tolerated.   - continue meloxicam as prescribed.   -  Reviewed and consistent with medication use as prescribed.  Imaging:  I have ordered MRI of the cervical spine without contrast.  Follow Up: RTC 4 weeks    Aileen Ocampo DO   Interventional Pain Medicine / Anesthesiology    Disclaimer: This note was partly generated using dictation software which may occasionally result in transcription errors.

## 2023-03-22 ENCOUNTER — PATIENT MESSAGE (OUTPATIENT)
Dept: PAIN MEDICINE | Facility: CLINIC | Age: 58
End: 2023-03-22
Payer: COMMERCIAL

## 2023-03-23 ENCOUNTER — PATIENT MESSAGE (OUTPATIENT)
Dept: PAIN MEDICINE | Facility: CLINIC | Age: 58
End: 2023-03-23
Payer: COMMERCIAL

## 2023-03-24 ENCOUNTER — PATIENT MESSAGE (OUTPATIENT)
Dept: PAIN MEDICINE | Facility: CLINIC | Age: 58
End: 2023-03-24
Payer: COMMERCIAL

## 2023-03-30 ENCOUNTER — HOSPITAL ENCOUNTER (OUTPATIENT)
Dept: RADIOLOGY | Facility: HOSPITAL | Age: 58
Discharge: HOME | End: 2023-03-30
Attending: STUDENT IN AN ORGANIZED HEALTH CARE EDUCATION/TRAINING PROGRAM
Payer: COMMERCIAL

## 2023-03-30 ENCOUNTER — CLINICAL SUPPORT (OUTPATIENT)
Dept: REHABILITATION | Facility: HOSPITAL | Age: 58
End: 2023-03-30
Attending: STUDENT IN AN ORGANIZED HEALTH CARE EDUCATION/TRAINING PROGRAM
Payer: COMMERCIAL

## 2023-03-30 DIAGNOSIS — M54.12 CERVICAL RADICULOPATHY: ICD-10-CM

## 2023-03-30 DIAGNOSIS — M54.2 CERVICALGIA: ICD-10-CM

## 2023-03-30 PROCEDURE — 72141 MRI NECK SPINE W/O DYE: CPT | Mod: TC,PO

## 2023-03-30 PROCEDURE — 97110 THERAPEUTIC EXERCISES: CPT | Mod: PO

## 2023-03-30 PROCEDURE — 97161 PT EVAL LOW COMPLEX 20 MIN: CPT | Mod: PO

## 2023-03-30 PROCEDURE — 72141 MRI CERVICAL SPINE WITHOUT CONTRAST: ICD-10-PCS | Mod: 26,,, | Performed by: RADIOLOGY

## 2023-03-30 PROCEDURE — 72141 MRI NECK SPINE W/O DYE: CPT | Mod: 26,,, | Performed by: RADIOLOGY

## 2023-03-30 NOTE — PATIENT INSTRUCTIONS
Access Code: [email or web address]  URL: [email or web address]  Date: 03/30/2023  Prepared by: Duglas Zurita Notes  : no sitting more than 90 consecutive minutes    Exercises  - Seated Scapular Retraction  - 2 x daily - 7 x weekly - 2 sets - 10 reps  - Seated Passive Cervical Retraction  - 2 x daily - 7 x weekly - 2 sets - 10 reps  - Seated Cervical Rotation with Nod  - 2 x daily - 7 x weekly - 2 sets - 10 reps  - Seated Shoulder Rolls  - 2 x daily - 7 x weekly - 2 sets - 10 reps    Patient Education  - Office Posture

## 2023-03-30 NOTE — PLAN OF CARE
"[facility]  Physical Therapy Initial Evaluation    Date: 3/30/2023   Name: Mignon Hardy  [facility] Number: [id number]    Therapy Diagnosis:   Encounter Diagnoses   Name Primary?    Cervical radiculopathy     Cervicalgia      Physician: Aileen Ocampo DO    Physician Orders: PT Eval and Treat   Medical Diagnosis from Referral: M54.12 (ICD-10-CM) - Cervical radiculopathy M54.2 (ICD-10-CM) - Cervicalgia   Evaluation Date: 3/30/2023  Authorization Period Expiration: 05/11/23  Plan of Care Expiration: 05/11/23  Progress Note Due: 04/27/23  Visit # / Visits authorized: 1/ 1   FOTO: 1/3    Precautions: Standard HTN    Time In: 1300  Time Out: 1348  Total Appointment Time (timed & untimed codes): 48 minutes      SUBJECTIVE   Date of onset: states 6 months. No JAVED , pain is staying the same.  Hand tremors 2-3yrs onset     History of current condition - Mignon reports: burning pain in left shoulder with driving and pulling with arms. Turning her neck to left over looking over her shoulder increases symptoms. 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 Denies any N&T.  States B hand tremors were being evaluated with orthopedics and was given wrist splint for B carpal tunnel syndrome. MRI later today for her neck. History of back pain and future knee injection and potentially L total knee replacement.     1996 neck surgery to remove portion of "soft disc' unsure of level and does not have any documentation.     Falls: -      Red Flag Screening:   Cough  Sneeze  Strain: (--)  Bladder/ bowel: (--)  Falls: (--)  Night pain: (--)  Unexplained weight loss: (--)  General health: fair     Imaging, MRI scheduled today,  Nerve conduction study:    EMG W/ ULTRASOUND AND NERVE CONDUCTION TEST 2 Extremities  3/31/2021  Summary: Left upper extremity nerve conduction studies show absent median sensory response. There is prolonged latency and low amplitude of the " median monitor response. Left upper extremity needle examination shows neurogenic changes in the C6/7 innervated muscles and the opponens pollicis muscle. No fibrillation potentials were seen.      Right upper extremity nerve conduction studies show prolonged latency, low amplitude, and slowed conduction velocity of the median sensory response. There is prolonged latency of the median monitor response. Right upper extremity needle examination shows neurogenic changes in the C6/7 innervated muscles. No fibrillation potentials were seen.      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        Prior Therapy: none  Social History:  lives with their spouse  Occupation: full time office work   Prior Level of Function: community ambulation   Current Level of Function: community ambulation     Pain:  Current 6/10, worst 8/10, best 6/10   Location: left superior shoulder blade, and in between shoulder blades    Description: Burning and pulling, sharp needles   Aggravating Factors: turning head to left, looking over shoulder, driving, and pulling.   Easing Factors:  not moving and being mindful posture.     Patients goals: reduction of sharp pain in neck      Medical History:   Past Medical History:   Diagnosis Date    Chronic bilateral low back pain with left-sided sciatica 2/10/2021    Essential tremor 2/10/2021    Heart disease     Hypertension     Obesity        Surgical History:   Mignon Hardy  has a past surgical history that includes Esophagogastroduodenoscopy (N/A, 5/20/2022) and Esophageal dilation (N/A, 5/20/2022).    Medications:   Mignon has a current medication list which includes the following prescription(s): ergocalciferol, hydrochlorothiazide, ibuprofen, ibuprofen, losartan, meloxicam, pregabalin, and tramadol.    Allergies:    Review of patient's allergies indicates:  No Known Allergies       Objective     Observation: patient overweight, antalgic gait (L knee pain)     Posture: good     Cervical Range of Motion: complaints of stiffness in all directions   % Limitation Pain   Flexion 0 -     Extension 10 -     Right Rotation 10 -     Left Rotation 10 -     Right Side Bending 25 -   Left Side Bending 10 -      Shoulder Range of Motion:   Shoulder Left Right   Flexion 14 145   Abduction 145 155   BTB  ~T10 ~T10    IR C6 C8     Discomfort with IR on L     Upper Extremity Strength  (R) UE  (L) UE    Shoulder flexion: 5/5 Shoulder flexion: 5/5   Shoulder Abduction: 5/5 Shoulder abduction: 5/5   Shoulder ER 5/5 Shoulder ER 5/5   Shoulder IR 5/5 Shoulder IR 5/5   Elbow flexion: 5/5 Elbow flexion: 5/5   Elbow extension: 5/5 Elbow extension: 4/5   Wrist flexion: 5/5 Wrist flexion: 5/5   Wrist extension: 5/5 Wrist extension: 4+/5      Thumb Extension 5/5 B  Finger Abduction  5/5 B      Special Tests:  Distraction -   Compression -   Spurlings -      ULT: negative on L side with median, ulnar, radial     Joint Mobility: restricted ,    PA Tspine non tender, Cspine NT   Thoracic mobility: fair     Palpation: increased tone in B traps, wnl tone in B rhomboids       Sensation: NT in error       CMS Impairment/Limitation/Restriction for FOTO Neck  Survey    Therapist reviewed FOTO scores for Mignon Hardy on 3/30/2023.   FOTO documents entered into [id number] - see Media section.    Limitation Score: 55%  Category: Mobility       TREATMENT     Total Treatment time separate from Evaluation: 14 minutes    Mignon received therapeutic exercises to develop strength, ROM, and posture for 14 minutes including:  Patient education: Discussed current condition, prognosis, plan of care. Demonstration and practice of Home Exercise Program.   Home Exercises and Patient Education Provided    Education provided:   - Role of PT, PT POC    Written Home Exercises Provided:  yes.  Exercises were reviewed and Mignon was able to demonstrate them prior to the end of the session.  Mignon demonstrated good  understanding of the education provided.     See EMR under Patient Instructions for exercises provided 3/30/2023.    Assessment   Mignon is a 57 y.o. female referred to outpatient Physical Therapy with a medical diagnosis of M54.12 (ICD-10-CM) - Cervical radiculopathy M54.2 (ICD-10-CM) - Cervicalgia . Physical exam is consistent with a likely C7 cervical radiculopathy. Negative with ULT tdoay  Primary impairments include AROM, PROM, joint mobility, strength, soft tissue restrictions, and pain which limits functional mobility. This pt is an excellent candidate for skilled PT tx and stands to benefit from a combination of manual therapy including joint mobilizations with trigger point/myofacscial release, therapeutic exercise to establish core/joint stability, neuromuscular re-education, dry needling, and modalities Prn. The pt has been educated on their dx/POC and consents to further PT tx.    Pt prognosis is Good.   Pt will benefit from skilled outpatient Physical Therapy to address the deficits stated above and in the chart below, provide pt/family education, and to maximize pt's level of independence.     Plan of care discussed with patient: Yes  Pt's spiritual, cultural and educational needs considered and patient is agreeable to the plan of care and goals as stated below:     Anticipated Barriers for therapy: history of lumbar and current knee pain, work duties (12hrs of office work)     Medical Necessity is demonstrated by the following  History  Co-morbidities and personal factors that may impact the plan of care Co-morbidities:   Chronic bilateral low back pain with left-sided sciatica   Essential tremor   Heart disease   Hypertension   Obesity       Personal Factors:   no deficits     low   Examination  Body Structures and Functions, activity limitations and participation  restrictions that may impact the plan of care Body Regions:   neck  upper extremities    Body Systems:    gross symmetry  ROM  strength  motor control    Participation Restrictions:   none    Activity limitations:   Learning and applying knowledge  no deficits    General Tasks and Commands  no deficits    Communication  no deficits    Mobility  no deficits    Self care  washing oneself (bathing, drying, washing hands)    Domestic Life  doing house work (cleaning house, washing dishes, laundry)    Interactions/Relationships  no deficits    Life Areas  no deficits    Community and Social Life  no deficits         low   Clinical Presentation evolving clinical presentation with changing clinical characteristics low   Decision Making/ Complexity Score: low     Goals:  Short Term Goals (3 Weeks):   1) Pt will demonstrate compliance with initial home exercise program as prescribed by physical therapist to improve independence with management of condition.  2) Pt to improve active range of motion in cervical spine by 5-10% to allow for improved functional mobility.  3) Pt to report cervical pain of <4/10 at current to improve tolerance to ADLs.   4) patient to report 50% reduction in frequency and intensity of burning in between shoulders and L shoulder.     Long Term Goals (6 Weeks):   1. Pt to achieve <45% limitation as measured by the FOTO to demonstrate decreased disability.  2) Pt to improve active range of motion to full with no pain to improve tolerance to ADL's.   3) Pt to report cervical pain of <3/10 at worst to improve tolerance to ADLs.      PLAN   Plan of care Certification: 3/30/2023 to 05/11/23    Outpatient Physical Therapy 2 times weekly for 6 weeks to include the following interventions: Cervical/Lumbar Traction, Manual Therapy, Moist Heat/ Ice, Neuromuscular Re-ed, Patient Education, Therapeutic Activities, Therapeutic Exercise, and modalities PRN .     Duglas Brady, PT      I CERTIFY THE NEED FOR THESE  SERVICES FURNISHED UNDER THIS PLAN OF TREATMENT AND WHILE UNDER MY CARE   Physician's comments:     Physician's Signature: ___________________________________________________

## 2023-04-17 ENCOUNTER — PATIENT MESSAGE (OUTPATIENT)
Dept: NEUROLOGY | Facility: CLINIC | Age: 58
End: 2023-04-17
Payer: COMMERCIAL

## 2023-04-18 ENCOUNTER — PATIENT MESSAGE (OUTPATIENT)
Dept: PAIN MEDICINE | Facility: CLINIC | Age: 58
End: 2023-04-18
Payer: COMMERCIAL

## 2023-04-18 ENCOUNTER — OFFICE VISIT (OUTPATIENT)
Dept: PAIN MEDICINE | Facility: CLINIC | Age: 58
End: 2023-04-18
Payer: COMMERCIAL

## 2023-04-18 VITALS
DIASTOLIC BLOOD PRESSURE: 83 MMHG | WEIGHT: 293 LBS | BODY MASS INDEX: 54.27 KG/M2 | HEART RATE: 88 BPM | SYSTOLIC BLOOD PRESSURE: 130 MMHG

## 2023-04-18 DIAGNOSIS — M50.30 DDD (DEGENERATIVE DISC DISEASE), CERVICAL: ICD-10-CM

## 2023-04-18 DIAGNOSIS — M54.2 CERVICALGIA: ICD-10-CM

## 2023-04-18 DIAGNOSIS — M47.812 CERVICAL SPONDYLOSIS: ICD-10-CM

## 2023-04-18 DIAGNOSIS — M54.12 CERVICAL RADICULOPATHY: Primary | ICD-10-CM

## 2023-04-18 PROCEDURE — 99214 PR OFFICE/OUTPT VISIT, EST, LEVL IV, 30-39 MIN: ICD-10-PCS | Mod: S$GLB,,, | Performed by: STUDENT IN AN ORGANIZED HEALTH CARE EDUCATION/TRAINING PROGRAM

## 2023-04-18 PROCEDURE — 99999 PR PBB SHADOW E&M-EST. PATIENT-LVL III: CPT | Mod: PBBFAC,,, | Performed by: STUDENT IN AN ORGANIZED HEALTH CARE EDUCATION/TRAINING PROGRAM

## 2023-04-18 PROCEDURE — 99214 OFFICE O/P EST MOD 30 MIN: CPT | Mod: S$GLB,,, | Performed by: STUDENT IN AN ORGANIZED HEALTH CARE EDUCATION/TRAINING PROGRAM

## 2023-04-18 PROCEDURE — 99999 PR PBB SHADOW E&M-EST. PATIENT-LVL III: ICD-10-PCS | Mod: PBBFAC,,, | Performed by: STUDENT IN AN ORGANIZED HEALTH CARE EDUCATION/TRAINING PROGRAM

## 2023-04-18 NOTE — PROGRESS NOTES
[facility] New Patient Evaluation    Referred by: No ref. provider found   Reason for referral: * No diagnoses found *     CC: No chief complaint on file.    Last 3 PDI Scores 4/18/2023 3/21/2023   Pain Disability Index (PDI) 46 40     Interval history 04/18/2023:  Patient presents for follow up of her neck pain that radiates into the left arm.  She started Lyrica at last visit and is taking it 50 mg b.i.d. with approximately 50% relief of her pain.  She was evaluated by Physical therapy and has completed 1 session thus far.  She had an MRI of her cervical spine performed which was significant for degenerative changes throughout including facet arthropathy and foraminal narrowing most severe at C3/C4, C4/C5 and C6/C7.  She would like to proceed with cervical epidural steroid injection.      Subjective 03/21/2023:   Mignon Hardy is a 57 y.o. female who presents complaining of neck pain that radiates into the left shoulder and arm x 1 year.  She reports undergoing a cervical spine surgery in 1996 however she is unsure of the details of the surgery or what was done.  An EMG from 03/31/2021 showed bilateral chronic to subacute C6/7 radiculopathy without active denervation.  We do not have any imaging of her neck.  She denies any weakness in the upper extremities.      Location: neck    Onset: one year ago   Current Pain Score: 7/10  Daily Pain of Range: 7-8/10  Quality: Aching, Burning, Throbbing, Grabbing, Tight, Tingling, Deep, Sharp, and Electric  Radiation: left shoulder and back  Worsened by: sitting, standing, and daily activity   Improved by: medications, rest, and sitting    Patient denies night fever/night sweats, urinary incontinence, bowel incontinence, significant weight loss, significant motor weakness, and loss of sensations.    Previous Interventions:  - None    Previous Therapies:  PT/OT: no   Chiropractor:   HEP:   Relevant Surgery: Pt reports undergoing cervical spine surgery in 1996 at  the time - She is unsure what she had done.  Previous Medications:   - NSAIDS:  Meloxicam 15 mg  - Muscle Relaxants:    - TCAs:   - SNRIs:   - Topicals:   - Anticonvulsants:  Lyrica  - Opioids:  Tramadol  - Adjuvants:     Current Pain Medications:  Meloxicam 15 mg QD  Lyrica 50 mg b.i.d.    Review of Systems:  ROS    GENERAL:  No weight loss, malaise or fevers. +Obesity  HEENT:   No recent changes in vision or hearing  NECK:  No difficulty with swallowing. No stridor.   RESPIRATORY:  Negative for cough, wheezing or shortness of breath, patient denies any recent URI.  CARDIOVASCULAR:  Negative for chest pain, leg swelling or palpitations.  GI:  Negative for abdominal discomfort, blood in stools or black stools or change in bowel habits.  MUSCULOSKELETAL:  See HPI.  SKIN:  Negative for lesions, rash, and itching.  PSYCH:  No mood disorder or recent psychosocial stressors.    HEMATOLOGY/LYMPHOLOGY:  Negative for prolonged bleeding, bruising easily or swollen nodes.  Patient is not currently taking any anti-coagulants  NEURO:   No history of headaches, syncope, paralysis, seizures or tremors.  All other reviewed and negative other than HPI.    History:  Current medications, allergies, medical history, surgical history,   family history, and social history were reviewed in the chart as marked.    Full Medication List:    Current Outpatient Medications:     ergocalciferol (ERGOCALCIFEROL) 50,000 unit Cap, Take 50,000 Units by mouth as needed., Disp: , Rfl:     hydroCHLOROthiazide (HYDRODIURIL) 25 MG tablet, hydrochlorothiazide 25 mg tablet, Disp: , Rfl:     ibuprofen (ADVIL,MOTRIN) 200 MG tablet, Take 600 mg by mouth every 8 (eight) hours as needed., Disp: , Rfl:     ibuprofen (ADVIL,MOTRIN) 800 MG tablet, Take 1 tablet (800 mg total) by mouth as needed for Pain., Disp: 30 tablet, Rfl: 0    meloxicam (MOBIC) 15 MG tablet, Take 1 tablet (15 mg total) by mouth once daily., Disp: 28 tablet, Rfl: 0    pregabalin (LYRICA) 50  MG capsule, Take 1 capsule (50 mg total) by mouth 3 (three) times daily., Disp: 90 capsule, Rfl: 1    traMADoL (ULTRAM) 50 mg tablet, Take 50 mg by mouth every 6 (six) hours., Disp: , Rfl:     losartan (COZAAR) 50 MG tablet, Take 50 mg by mouth. Patient needs refill., Disp: , Rfl:      Allergies:  Patient has no known allergies.     Medical History:   has a past medical history of Chronic bilateral low back pain with left-sided sciatica (2/10/2021), Essential tremor (2/10/2021), Heart disease, Hypertension, and Obesity.    Surgical History:   has a past surgical history that includes Esophagogastroduodenoscopy (N/A, 5/20/2022) and Esophageal dilation (N/A, 5/20/2022).    Family History:  family history includes Cancer in her maternal grandmother; Heart disease in her father; Tremor in her father.    Social History:   reports that she has never smoked. She does not have any smokeless tobacco history on file. She reports that she does not drink alcohol and does not use drugs.    Physical Exam:  Vitals:    04/18/23 1354   BP: 130/83   Pulse: 88   Weight: (!) 166.7 kg (367 lb 8.1 oz)   PainSc:   7   PainLoc: Neck       GENERAL: Well appearing, in no acute distress, alert and oriented x3.  PSYCH:  Mood and affect appropriate.  SKIN: Skin color, texture, turgor normal, no rashes or lesions.  HEAD/FACE:  Normocephalic, atraumatic. Cranial nerves grossly intact.  NECK: Normal ROM. Supple.  There is no significant pain to palpation over the cervical paraspinal muscles.  No increased pain with facet loading.  Spurling test negative.  Mild decrease in neck flexion and extension due to pain.  CV: RRR with palpation of the radial artery.  PULM: No evidence of respiratory difficulty, symmetric chest rise.  GI:  Soft and non-distended.  MSK: Peripheral joint ROM is full and pain free without obvious instability or laxity in all four extremities with ambulation. No deformities, edema, or skin discoloration.  No atrophy or tone  abnormalities are noted.   NEURO: Bilateral upper and lower extremity coordination and strength is symmetric.  No loss of sensation is noted in the bilateral upper extremities.  MENTAL STATUS: A x O x 3, good concentration, speech is fluent and goal directed  MOTOR: 5/5 in all muscle groups  GAIT: normal. Ambulates unassisted.    Imaging:  EXAMINATION:  MRI CERVICAL SPINE WITHOUT CONTRAST     CLINICAL HISTORY:  Neck pain, chronic;.  Cervicalgia     TECHNIQUE:  Multiplanar, multisequence MR images of the cervical spine were acquired without the administration of contrast.     COMPARISON:  None.     FINDINGS:  Somewhat limited exam secondary to patient body habitus and slight motion artifact.     Morphology: Solid osseous fusion at C5-C6.  Marrow signal is within normal limits and vertebral body heights are preserved.     Alignment: Grade 1 retrolisthesis of C6 on C7..     Cord: The cervical cord shows signal content throughout its length.     Craniocervical Junction: Cerebellar tonsils are normally positioned. The visualized portions of the posterior fossa are unremarkable. The regional osseous anatomy is normal.        Disc levels:        C2-C3: Shallow broad-based disc osteophyte complex lateralizing to the left with uncovertebral spurring producing mild narrowing of the left central spinal canal and left foramen.  The right foramen is patent.     C3-C4: Shallow broad-based disc osteophyte complex in the setting of moderate disc height loss and mild ligamentum flavum thickening producing no more than mild narrowing of the spinal canal.  Facet arthrosis and shallow uncovertebral spurring most pronounced on the right contributing to moderate to severe bilateral foraminal narrowing, right greater than left.     C4-C5: Shallow broad-based disc osteophyte complex flattening the ventral thecal sac slightly contouring the ventral cord surface producing no more than mild narrowing of the spinal canal.  Uncovertebral  spurring and right-sided facet arthrosis produce moderate right foraminal narrowing.  Mild left foraminal narrowing..     C5-C6: Solid osseous fusion and decompression of the spinal canal/foramina..     C6-C7: Severe degenerative disc height loss and shallow broad-based disc osteophyte complex with ligamentum flavum thickening producing mild to moderate narrowing of the spinal canal.  Uncovertebral spurring and facet arthrosis produces moderate to severe bilateral foraminal narrowing..     C7-T1: Mild degenerative disc desiccation and height loss.  No disc herniation.  Mild facet arthrosis.  The spinal canal and foramina are patent..     Soft tissues: Incidentally seen 1.8 cm left thyroid nodule.        Impression:     1. Solid osseous fusion and decompression of the spinal canal at C5-C6.  2. Degenerative changes including uncovertebral spurring and facet arthrosis producing multilevel foraminal narrowing most notable for moderate to severe bilateral C3-C4, moderate right C4-C5, and moderate to severe bilateral C6-C7 narrowing.  3. No high-grade narrowing of the spinal canal.  4. Incidentally seen 1.8 cm left thyroid nodule for which routine thyroid ultrasound is recommended for further characterization.     Electronically signed by: Armond Chaves  Date:                                            03/30/2023      EMG W/ ULTRASOUND AND NERVE CONDUCTION TEST 2 Extremities  3/31/2021  Summary: Left upper extremity nerve conduction studies show absent median sensory response. There is prolonged latency and low amplitude of the median monitor response. Left upper extremity needle examination shows neurogenic changes in the C6/7 innervated muscles and the opponens pollicis muscle. No fibrillation potentials were seen.      Right upper extremity nerve conduction studies show prolonged latency, low amplitude, and slowed conduction velocity of the median sensory response. There is prolonged latency of the median monitor  response. Right upper extremity needle examination shows neurogenic changes in the C6/7 innervated muscles. No fibrillation potentials were seen.      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        Labs:  BMP  Lab Results   Component Value Date     01/03/2008    K 4.5 01/03/2008     01/03/2008    CO2 25 01/03/2008    BUN 10 01/03/2008    CREATININE 0.7 01/03/2008    CALCIUM 9.2 01/03/2008     Lab Results   Component Value Date    ALT 10 01/03/2008    AST 12 01/03/2008    ALKPHOS 80 01/03/2008    BILITOT 0.5 01/03/2008     Lab Results   Component Value Date    WBC 7.35 01/03/2008    HGB 12.0 01/03/2008    HCT 38.0 01/03/2008    MCV 90.0 01/03/2008     (H) 01/03/2008       Assessment:  Problem List Items Addressed This Visit    None      03/21/2023- Mignon Hardy is a 57 y.o. female who  has a past medical history of Chronic bilateral low back pain with left-sided sciatica (2/10/2021), Essential tremor (2/10/2021), Heart disease, Hypertension, and Obesity.  By history and examination this patient has chronicneck pain with left radiculopathy in the distribution of C6 and C7 at noted on EMG.  I will order an MRI of the cervical spine to confirm pathology.  We discussed the underlying diagnoses and multiple treatment options including non-opioid medications, interventional procedures, physical therapy, home exercise, and weight loss.  The risks and benefits of each treatment option were discussed and all questions were answered.        04/18/2023 - Patient presents for follow up of her neck pain with left-sided radiculopathy.  Recent MRI was significant for degenerative changes including uncovertebral spurring and facet arthrosis producing multilevel foraminal narrowing most notable for moderate to severe bilateral  C3-C4, moderate right C4-C5, and moderate to severe bilateral C6-C7 narrowing.  She has started physical therapy and Lyrica which has provided her approximately 50% relief of her pain.  She is interested in proceeding with cervical epidural steroid injection for her neck pain.      Treatment Plan:   Procedures:  Schedule patient for C7/T1 cervical epidural steroid injection.  PT/OT/HEP:  Continue with physical therapy.  I have stressed the importance of physical activity and a home exercise plan to help with pain and improve health.  Discussed weight loss as well.  Medications:    - I have prescribed Lyrica 50 mg t.i.d..  She is currently taking it b.i.d., will attempt to titrate up to t.i.d. as tolerated.   - continue meloxicam as prescribed.   -  Reviewed and consistent with medication use as prescribed.  Imaging:  MRI of the cervical spine was reviewed and results were discussed with the patient using spine models.  Follow Up: RTC 4 weeks postprocedure    Aileen Ocampo DO   Interventional Pain Medicine / Anesthesiology    Disclaimer: This note was partly generated using dictation software which may occasionally result in transcription errors.

## 2023-04-19 ENCOUNTER — TELEPHONE (OUTPATIENT)
Dept: PAIN MEDICINE | Facility: CLINIC | Age: 58
End: 2023-04-19
Payer: COMMERCIAL

## 2023-04-19 ENCOUNTER — PATIENT MESSAGE (OUTPATIENT)
Dept: PAIN MEDICINE | Facility: CLINIC | Age: 58
End: 2023-04-19
Payer: COMMERCIAL

## 2023-04-19 ENCOUNTER — OFFICE VISIT (OUTPATIENT)
Dept: ORTHOPEDICS | Facility: CLINIC | Age: 58
End: 2023-04-19
Payer: COMMERCIAL

## 2023-04-19 DIAGNOSIS — M54.12 CERVICAL RADICULOPATHY: Primary | ICD-10-CM

## 2023-04-19 DIAGNOSIS — M17.10 ARTHRITIS OF KNEE: Primary | ICD-10-CM

## 2023-04-19 PROCEDURE — 99499 UNLISTED E&M SERVICE: CPT | Mod: S$GLB,,, | Performed by: ORTHOPAEDIC SURGERY

## 2023-04-19 PROCEDURE — 99999 PR PBB SHADOW E&M-EST. PATIENT-LVL II: ICD-10-PCS | Mod: PBBFAC,,, | Performed by: ORTHOPAEDIC SURGERY

## 2023-04-19 PROCEDURE — 99999 PR PBB SHADOW E&M-EST. PATIENT-LVL II: CPT | Mod: PBBFAC,,, | Performed by: ORTHOPAEDIC SURGERY

## 2023-04-19 PROCEDURE — 99499 NO LOS: ICD-10-PCS | Mod: S$GLB,,, | Performed by: ORTHOPAEDIC SURGERY

## 2023-04-19 PROCEDURE — 20610 LARGE JOINT ASPIRATION/INJECTION: L KNEE: ICD-10-PCS | Mod: LT,S$GLB,, | Performed by: ORTHOPAEDIC SURGERY

## 2023-04-19 PROCEDURE — 20610 DRAIN/INJ JOINT/BURSA W/O US: CPT | Mod: LT,S$GLB,, | Performed by: ORTHOPAEDIC SURGERY

## 2023-04-19 NOTE — PROCEDURES
Large Joint Aspiration/Injection: L knee    Date/Time: 4/19/2023 3:15 PM  Performed by: Brian Austin MD  Authorized by: Brian Austin MD     Consent Done?:  Yes (Verbal)  Indications:  Pain  Site marked: the procedure site was marked    Timeout: prior to procedure the correct patient, procedure, and site was verified      Details:  Needle Size:  20 G  Approach:  Anterolateral  Location:  Knee  Site:  L knee  Medications:  32 mg triamcinolone acetonide 32 mg  Patient tolerance:  Patient tolerated the procedure well with no immediate complications

## 2023-04-19 NOTE — PROGRESS NOTES
Past Medical History:   Diagnosis Date    Chronic bilateral low back pain with left-sided sciatica 2/10/2021    Essential tremor 2/10/2021    Heart disease     Hypertension     Obesity        Past Surgical History:   Procedure Laterality Date    ESOPHAGEAL DILATION N/A 5/20/2022    Procedure: DILATION, ESOPHAGUS;  Surgeon: Jose Aguilera MD;  Location: [facility];  Service: Endoscopy;  Laterality: N/A;  Bates    ESOPHAGOGASTRODUODENOSCOPY N/A 5/20/2022    Procedure: EGD (ESOPHAGOGASTRODUODENOSCOPY);  Surgeon: Jose Aguilera MD;  Location: [facility];  Service: Endoscopy;  Laterality: N/A;       Current Outpatient Medications   Medication Sig    ergocalciferol (ERGOCALCIFEROL) 50,000 unit Cap Take 50,000 Units by mouth as needed.    hydroCHLOROthiazide (HYDRODIURIL) 25 MG tablet hydrochlorothiazide 25 mg tablet    ibuprofen (ADVIL,MOTRIN) 200 MG tablet Take 600 mg by mouth every 8 (eight) hours as needed.    ibuprofen (ADVIL,MOTRIN) 800 MG tablet Take 1 tablet (800 mg total) by mouth as needed for Pain.    losartan (COZAAR) 50 MG tablet Take 50 mg by mouth. Patient needs refill.    meloxicam (MOBIC) 15 MG tablet Take 1 tablet (15 mg total) by mouth once daily.    pregabalin (LYRICA) 50 MG capsule Take 1 capsule (50 mg total) by mouth 3 (three) times daily.     No current facility-administered medications for this visit.       Review of patient's allergies indicates:  No Known Allergies    Family History   Problem Relation Age of Onset    Heart disease Father     Tremor Father     Cancer Maternal Grandmother        Social History     Socioeconomic History    Marital status:    Tobacco Use    Smoking status: Never   Substance and Sexual Activity    Alcohol use: Never    Drug use: Never       Chief Complaint:   No chief complaint on file.      History of present illness:  This is a 57-year-old female seen for chronic bilateral knee pain.  Left greater than right.  Patient has had pain for 3-4 years now.   Patient has been taking ibuprofen as well as getting periodic injections.  Patient just recently switched doctors.  She is had both cortisone as well as viscosupplementation.  Viscosupplementation does not help.  Pain is a 9/10.      Review of Systems:    Constitution: Negative for chills, fever, and sweats.  Negative for unexplained weight loss.    HENT:  Negative for headaches and blurry vision.    Cardiovascular:Negative for chest pain or irregular heart beat. Negative for hypertension.    Respiratory:  Negative for cough and shortness of breath.    Gastrointestinal: Negative for abdominal pain, heartburn, melena, nausea, and vomitting.    Genitourinary:  Negative bladder incontinence and dysuria.    Musculoskeletal:  See HPI    Neurological: Negative for numbness.    Psychiatric/Behavioral: Negative for depression.  The patient is not nervous/anxious.      Endocrine: Negative for polyuria    Hematologic/Lymphatic: Negative for bleeding problem.  Does not bruise/bleed easily.    Skin: Negative for poor would healing and rash      Physical Examination:    Vital Signs:  There were no vitals filed for this visit.    There is no height or weight on file to calculate BMI.    This a well-developed, well nourished patient in no acute distress.  They are alert and oriented and cooperative to examination.  Pt. walks without an antalgic gait.      Examination of bilateral knees shows no rashes or erythema. There are no masses ecchymosis or effusion. Patient has full range of motion from 0-130°. Patient is nontender to palpation over lateral joint line and moderately tender to palpation over the medial joint line. Patient has a - Lachman exam, - anterior drawer exam, and - posterior drawer exam. - Lalitha's exam. Knee is stable to varus and valgus stress. 5 out of 5 motor strength. Palpable distal pulses. Intact light touch sensation. Negative Patellofemoral crepitus      X-rays:  X-rays of both knees are  reviewed which  show severe medial joint space narrowing bilaterally     Assessment::  Severe bilateral varus knee arthritis    Plan:  I injected the left knee with Zilretta today. Follow up next week for the right knee.     This note was created using [employer] voice recognition software that occasionally misinterpreted phrases or words.    Consult note is delivered via Epic messaging service.

## 2023-04-26 ENCOUNTER — OFFICE VISIT (OUTPATIENT)
Dept: ORTHOPEDICS | Facility: CLINIC | Age: 58
End: 2023-04-26
Payer: COMMERCIAL

## 2023-04-26 DIAGNOSIS — M17.10 ARTHRITIS OF KNEE: Primary | ICD-10-CM

## 2023-04-26 PROCEDURE — 99999 PR PBB SHADOW E&M-EST. PATIENT-LVL II: ICD-10-PCS | Mod: PBBFAC,,, | Performed by: ORTHOPAEDIC SURGERY

## 2023-04-26 PROCEDURE — 99499 UNLISTED E&M SERVICE: CPT | Mod: S$GLB,,, | Performed by: ORTHOPAEDIC SURGERY

## 2023-04-26 PROCEDURE — 99999 PR PBB SHADOW E&M-EST. PATIENT-LVL II: CPT | Mod: PBBFAC,,, | Performed by: ORTHOPAEDIC SURGERY

## 2023-04-26 PROCEDURE — 20610 DRAIN/INJ JOINT/BURSA W/O US: CPT | Mod: RT,S$GLB,, | Performed by: ORTHOPAEDIC SURGERY

## 2023-04-26 PROCEDURE — 99499 NO LOS: ICD-10-PCS | Mod: S$GLB,,, | Performed by: ORTHOPAEDIC SURGERY

## 2023-04-26 PROCEDURE — 20610 PR DRAIN/INJECT LARGE JOINT/BURSA: ICD-10-PCS | Mod: RT,S$GLB,, | Performed by: ORTHOPAEDIC SURGERY

## 2023-04-26 NOTE — PROCEDURES
Large Joint Aspiration/Injection: R knee joint    Date/Time: 4/26/2023 2:00 PM  Performed by: Brian Austin MD  Authorized by: Brian Austin MD     Consent Done?:  Yes (Verbal)  Indications:  Pain  Site marked: the procedure site was marked    Timeout: prior to procedure the correct patient, procedure, and site was verified      Details:  Needle Size:  20 G  Approach:  Anterolateral  Location:  Knee  Site:  R knee joint  Medications:  32 mg triamcinolone acetonide 32 mg  Patient tolerance:  Patient tolerated the procedure well with no immediate complications

## 2023-04-26 NOTE — PROGRESS NOTES
Past Medical History:   Diagnosis Date    Chronic bilateral low back pain with left-sided sciatica 2/10/2021    Essential tremor 2/10/2021    Heart disease     Hypertension     Obesity        Past Surgical History:   Procedure Laterality Date    ESOPHAGEAL DILATION N/A 5/20/2022    Procedure: DILATION, ESOPHAGUS;  Surgeon: Jose Aguilera MD;  Location: [facility];  Service: Endoscopy;  Laterality: N/A;  Bates    ESOPHAGOGASTRODUODENOSCOPY N/A 5/20/2022    Procedure: EGD (ESOPHAGOGASTRODUODENOSCOPY);  Surgeon: Jose Aguilera MD;  Location: [facility];  Service: Endoscopy;  Laterality: N/A;       Current Outpatient Medications   Medication Sig    ergocalciferol (ERGOCALCIFEROL) 50,000 unit Cap Take 50,000 Units by mouth as needed.    hydroCHLOROthiazide (HYDRODIURIL) 25 MG tablet hydrochlorothiazide 25 mg tablet    ibuprofen (ADVIL,MOTRIN) 200 MG tablet Take 600 mg by mouth every 8 (eight) hours as needed.    ibuprofen (ADVIL,MOTRIN) 800 MG tablet Take 1 tablet (800 mg total) by mouth as needed for Pain.    losartan (COZAAR) 50 MG tablet Take 50 mg by mouth. Patient needs refill.    meloxicam (MOBIC) 15 MG tablet Take 1 tablet (15 mg total) by mouth once daily.    pregabalin (LYRICA) 50 MG capsule Take 1 capsule (50 mg total) by mouth 3 (three) times daily.     No current facility-administered medications for this visit.       Review of patient's allergies indicates:  No Known Allergies    Family History   Problem Relation Age of Onset    Heart disease Father     Tremor Father     Cancer Maternal Grandmother        Social History     Socioeconomic History    Marital status:    Tobacco Use    Smoking status: Never   Substance and Sexual Activity    Alcohol use: Never    Drug use: Never       Chief Complaint:   No chief complaint on file.      History of present illness:  This is a 57-year-old female seen for chronic bilateral knee pain.  Left greater than right.  Patient has had pain for 3-4 years now.   Patient has been taking ibuprofen as well as getting periodic injections.  Patient just recently switched doctors.  She is had both cortisone as well as viscosupplementation.  Viscosupplementation does not help.  Pain is a 9/10.      Review of Systems:    Constitution: Negative for chills, fever, and sweats.  Negative for unexplained weight loss.    HENT:  Negative for headaches and blurry vision.    Cardiovascular:Negative for chest pain or irregular heart beat. Negative for hypertension.    Respiratory:  Negative for cough and shortness of breath.    Gastrointestinal: Negative for abdominal pain, heartburn, melena, nausea, and vomitting.    Genitourinary:  Negative bladder incontinence and dysuria.    Musculoskeletal:  See HPI    Neurological: Negative for numbness.    Psychiatric/Behavioral: Negative for depression.  The patient is not nervous/anxious.      Endocrine: Negative for polyuria    Hematologic/Lymphatic: Negative for bleeding problem.  Does not bruise/bleed easily.    Skin: Negative for poor would healing and rash      Physical Examination:    Vital Signs:  There were no vitals filed for this visit.    There is no height or weight on file to calculate BMI.    This a well-developed, well nourished patient in no acute distress.  They are alert and oriented and cooperative to examination.  Pt. walks without an antalgic gait.      Examination of bilateral knees shows no rashes or erythema. There are no masses ecchymosis or effusion. Patient has full range of motion from 0-130°. Patient is nontender to palpation over lateral joint line and moderately tender to palpation over the medial joint line. Patient has a - Lachman exam, - anterior drawer exam, and - posterior drawer exam. - Lalitha's exam. Knee is stable to varus and valgus stress. 5 out of 5 motor strength. Palpable distal pulses. Intact light touch sensation. Negative Patellofemoral crepitus      X-rays:  X-rays of both knees are  reviewed which  show severe medial joint space narrowing bilaterally     Assessment::  Severe bilateral varus knee arthritis    Plan:  I injected the right knee with Zilretta today. Follow up as needed    This note was created using [employer] voice recognition software that occasionally misinterpreted phrases or words.    Consult note is delivered via Epic messaging service.

## 2023-05-03 ENCOUNTER — OFFICE VISIT (OUTPATIENT)
Dept: ORTHOPEDICS | Facility: CLINIC | Age: 58
End: 2023-05-03
Payer: COMMERCIAL

## 2023-05-03 VITALS — WEIGHT: 293 LBS | BODY MASS INDEX: 43.4 KG/M2 | HEIGHT: 69 IN

## 2023-05-03 DIAGNOSIS — G56.03 CARPAL TUNNEL SYNDROME, BILATERAL: Primary | ICD-10-CM

## 2023-05-03 DIAGNOSIS — G25.0 ESSENTIAL TREMOR: ICD-10-CM

## 2023-05-03 PROCEDURE — 99214 OFFICE O/P EST MOD 30 MIN: CPT | Mod: S$GLB,,, | Performed by: ORTHOPAEDIC SURGERY

## 2023-05-03 PROCEDURE — 99214 PR OFFICE/OUTPT VISIT, EST, LEVL IV, 30-39 MIN: ICD-10-PCS | Mod: S$GLB,,, | Performed by: ORTHOPAEDIC SURGERY

## 2023-05-03 PROCEDURE — 99999 PR PBB SHADOW E&M-EST. PATIENT-LVL III: CPT | Mod: PBBFAC,,, | Performed by: ORTHOPAEDIC SURGERY

## 2023-05-03 PROCEDURE — 99999 PR PBB SHADOW E&M-EST. PATIENT-LVL III: ICD-10-PCS | Mod: PBBFAC,,, | Performed by: ORTHOPAEDIC SURGERY

## 2023-05-03 NOTE — H&P (VIEW-ONLY)
5/3/2023    Chief Complaint:  Chief Complaint   Patient presents with    Right Hand - Pain     EMG results     Left Hand - Pain     EMG results        HPI:  Mignon Hardy is a 57 y.o. female, who presents to clinic today she has a history of bilateral carpal tunnel syndrome.  She has tried wearing splints at night.  She was sent for a nerve conduction study.  She is here today for follow-up.  She is also complaining that she continues to have a tremor about both of her hands.    PMHX:  Past Medical History:   Diagnosis Date    Chronic bilateral low back pain with left-sided sciatica 2/10/2021    Essential tremor 2/10/2021    Heart disease     Hypertension     Obesity        PSHX:  Past Surgical History:   Procedure Laterality Date    ESOPHAGEAL DILATION N/A 5/20/2022    Procedure: DILATION, ESOPHAGUS;  Surgeon: Jose Aguilera MD;  Location: [facility];  Service: Endoscopy;  Laterality: N/A;  [place]    ESOPHAGOGASTRODUODENOSCOPY N/A 5/20/2022    Procedure: EGD (ESOPHAGOGASTRODUODENOSCOPY);  Surgeon: Jose Aguilera MD;  Location: [facility];  Service: Endoscopy;  Laterality: N/A;       FMHX:  Family History   Problem Relation Age of Onset    Heart disease Father     Tremor Father     Cancer Maternal Grandmother        SOCHX:  Social History     Tobacco Use    Smoking status: Never    Smokeless tobacco: Not on file   Substance Use Topics    Alcohol use: Never       ALLERGIES:  Patient has no known allergies.    CURRENT MEDICATIONS:  Current Outpatient Medications on File Prior to Visit   Medication Sig Dispense Refill    ergocalciferol (ERGOCALCIFEROL) 50,000 unit Cap Take 50,000 Units by mouth as needed.      hydroCHLOROthiazide (HYDRODIURIL) 25 MG tablet hydrochlorothiazide 25 mg tablet      ibuprofen (ADVIL,MOTRIN) 200 MG tablet Take 600 mg by mouth every 8 (eight) hours as needed.      ibuprofen (ADVIL,MOTRIN) 800 MG tablet Take 1 tablet (800 mg total) by mouth as needed for Pain. 30 tablet 0     "meloxicam (MOBIC) 15 MG tablet Take 1 tablet (15 mg total) by mouth once daily. 28 tablet 0    pregabalin (LYRICA) 50 MG capsule Take 1 capsule (50 mg total) by mouth 3 (three) times daily. 90 capsule 1    losartan (COZAAR) 50 MG tablet Take 50 mg by mouth. Patient needs refill.       No current facility-administered medications on file prior to visit.       REVIEW OF SYSTEMS:  ROS    GENERAL PHYSICAL EXAM:   Ht 5' 9" (1.753 m)   Wt (!) 166.5 kg (367 lb)   BMI 54.20 kg/m²    GEN: well developed, well nourished, no acute distress   HENT: Normocephalic, atraumatic   EYES: No discharge, conjunctiva normal   NECK: Supple, non-tender   PULM: No wheezing, no respiratory distress   CV: RRR   ABD: Soft, non-tender    ORTHO EXAM:   Examination of bilateral hands and wrist reveals that there is no edema.  There are no major skin changes.  Palpation produces no specific tenderness.  She reports intact sensation in the median radial and ulnar distribution.  She has positive Tinel's bilaterally and mildly positive Durkan's test bilaterally.  Has 5/5 thenar muscular strength.  She does have what appears to be a resting/essential tremor.  She does have 2+ radial pulses    RADIOLOGY:   EMG nerve conduction study has been reviewed.  It is consistent with severe bilateral carpal tunnel syndrome    ASSESSMENT:   Bilateral carpal tunnel syndrome, essential tremor    PLAN:  1. I have discussed treatment going forward for the carpal tunnels.  I have discussed the possibility of carpal tunnel release.  After discussion of the risks and benefits of the procedure informed consent has been obtained    2. Will proceed with right carpal tunnel release under local anesthesia    3.  The patient will follow up with me 2 weeks postoperatively.  At that time if she is doing well I will consider referral to neurology for assessment of her tremor      "

## 2023-05-03 NOTE — PROGRESS NOTES
5/3/2023    Chief Complaint:  Chief Complaint   Patient presents with    Right Hand - Pain     EMG results     Left Hand - Pain     EMG results        HPI:  Mignon Hardy is a 57 y.o. female, who presents to clinic today she has a history of bilateral carpal tunnel syndrome.  She has tried wearing splints at night.  She was sent for a nerve conduction study.  She is here today for follow-up.  She is also complaining that she continues to have a tremor about both of her hands.    PMHX:  Past Medical History:   Diagnosis Date    Chronic bilateral low back pain with left-sided sciatica 2/10/2021    Essential tremor 2/10/2021    Heart disease     Hypertension     Obesity        PSHX:  Past Surgical History:   Procedure Laterality Date    ESOPHAGEAL DILATION N/A 5/20/2022    Procedure: DILATION, ESOPHAGUS;  Surgeon: Jose Aguilera MD;  Location: [facility];  Service: Endoscopy;  Laterality: N/A;  [place]    ESOPHAGOGASTRODUODENOSCOPY N/A 5/20/2022    Procedure: EGD (ESOPHAGOGASTRODUODENOSCOPY);  Surgeon: Jose Aguilera MD;  Location: [facility];  Service: Endoscopy;  Laterality: N/A;       FMHX:  Family History   Problem Relation Age of Onset    Heart disease Father     Tremor Father     Cancer Maternal Grandmother        SOCHX:  Social History     Tobacco Use    Smoking status: Never    Smokeless tobacco: Not on file   Substance Use Topics    Alcohol use: Never       ALLERGIES:  Patient has no known allergies.    CURRENT MEDICATIONS:  Current Outpatient Medications on File Prior to Visit   Medication Sig Dispense Refill    ergocalciferol (ERGOCALCIFEROL) 50,000 unit Cap Take 50,000 Units by mouth as needed.      hydroCHLOROthiazide (HYDRODIURIL) 25 MG tablet hydrochlorothiazide 25 mg tablet      ibuprofen (ADVIL,MOTRIN) 200 MG tablet Take 600 mg by mouth every 8 (eight) hours as needed.      ibuprofen (ADVIL,MOTRIN) 800 MG tablet Take 1 tablet (800 mg total) by mouth as needed for Pain. 30 tablet 0     "meloxicam (MOBIC) 15 MG tablet Take 1 tablet (15 mg total) by mouth once daily. 28 tablet 0    pregabalin (LYRICA) 50 MG capsule Take 1 capsule (50 mg total) by mouth 3 (three) times daily. 90 capsule 1    losartan (COZAAR) 50 MG tablet Take 50 mg by mouth. Patient needs refill.       No current facility-administered medications on file prior to visit.       REVIEW OF SYSTEMS:  ROS    GENERAL PHYSICAL EXAM:   Ht 5' 9" (1.753 m)   Wt (!) 166.5 kg (367 lb)   BMI 54.20 kg/m²    GEN: well developed, well nourished, no acute distress   HENT: Normocephalic, atraumatic   EYES: No discharge, conjunctiva normal   NECK: Supple, non-tender   PULM: No wheezing, no respiratory distress   CV: RRR   ABD: Soft, non-tender    ORTHO EXAM:   Examination of bilateral hands and wrist reveals that there is no edema.  There are no major skin changes.  Palpation produces no specific tenderness.  She reports intact sensation in the median radial and ulnar distribution.  She has positive Tinel's bilaterally and mildly positive Durkan's test bilaterally.  Has 5/5 thenar muscular strength.  She does have what appears to be a resting/essential tremor.  She does have 2+ radial pulses    RADIOLOGY:   EMG nerve conduction study has been reviewed.  It is consistent with severe bilateral carpal tunnel syndrome    ASSESSMENT:   Bilateral carpal tunnel syndrome, essential tremor    PLAN:  1. I have discussed treatment going forward for the carpal tunnels.  I have discussed the possibility of carpal tunnel release.  After discussion of the risks and benefits of the procedure informed consent has been obtained    2. Will proceed with right carpal tunnel release under local anesthesia    3.  The patient will follow up with me 2 weeks postoperatively.  At that time if she is doing well I will consider referral to neurology for assessment of her tremor      "

## 2023-05-03 NOTE — PATIENT INSTRUCTIONS
Surgery Instructions:     Your surgery is scheduled on 05/09/23 at the surgery center: [address] [facility] [address], 1st floor, second entrance.    The pre-op department will be in contact with you prior to your procedure to review medications and instructions.       Nothing to eat or drink after midnight prior to day of surgery.    The surgery center will contact you the day prior to surgery to advise you of your arrival time for surgery.     Your post op appointment is scheduled on 05/24/23 @ 8:40am.    You will be contacted by an automated text message every morning for for 14 days after your surgery inquiring if you have any COVID symptoms.  If you have any concerns regarding COVID please reply to the text message and then an [facility] On Call Registered Nurse will contact you later that day.

## 2023-05-04 DIAGNOSIS — G56.01 CARPAL TUNNEL SYNDROME OF RIGHT WRIST: Primary | ICD-10-CM

## 2023-05-07 PROBLEM — I10 ESSENTIAL HYPERTENSION: Status: ACTIVE | Noted: 2023-05-07

## 2023-05-08 ENCOUNTER — HOSPITAL ENCOUNTER (OUTPATIENT)
Dept: RADIOLOGY | Facility: HOSPITAL | Age: 58
Discharge: HOME | End: 2023-05-08
Attending: INTERNAL MEDICINE
Payer: COMMERCIAL

## 2023-05-08 ENCOUNTER — OFFICE VISIT (OUTPATIENT)
Dept: CARDIOLOGY | Facility: CLINIC | Age: 58
End: 2023-05-08
Payer: COMMERCIAL

## 2023-05-08 VITALS
SYSTOLIC BLOOD PRESSURE: 168 MMHG | HEIGHT: 69 IN | BODY MASS INDEX: 43.4 KG/M2 | HEART RATE: 69 BPM | WEIGHT: 293 LBS | DIASTOLIC BLOOD PRESSURE: 93 MMHG

## 2023-05-08 DIAGNOSIS — R06.02 SHORTNESS OF BREATH: ICD-10-CM

## 2023-05-08 DIAGNOSIS — Z13.220 SCREENING, LIPID: ICD-10-CM

## 2023-05-08 DIAGNOSIS — R60.0 LOWER EXTREMITY EDEMA: ICD-10-CM

## 2023-05-08 DIAGNOSIS — I10 ESSENTIAL HYPERTENSION: Primary | ICD-10-CM

## 2023-05-08 PROCEDURE — 71046 X-RAY EXAM CHEST 2 VIEWS: CPT | Mod: 26,,, | Performed by: RADIOLOGY

## 2023-05-08 PROCEDURE — 99204 OFFICE O/P NEW MOD 45 MIN: CPT | Mod: 25,S$GLB,, | Performed by: INTERNAL MEDICINE

## 2023-05-08 PROCEDURE — 99204 PR OFFICE/OUTPT VISIT, NEW, LEVL IV, 45-59 MIN: ICD-10-PCS | Mod: 25,S$GLB,, | Performed by: INTERNAL MEDICINE

## 2023-05-08 PROCEDURE — 93005 ELECTROCARDIOGRAM TRACING: CPT | Mod: PO

## 2023-05-08 PROCEDURE — 71046 XR CHEST PA AND LATERAL: ICD-10-PCS | Mod: 26,,, | Performed by: RADIOLOGY

## 2023-05-08 PROCEDURE — 71046 X-RAY EXAM CHEST 2 VIEWS: CPT | Mod: TC,FY,PO

## 2023-05-08 PROCEDURE — 99999 PR PBB SHADOW E&M-EST. PATIENT-LVL IV: CPT | Mod: PBBFAC,,, | Performed by: INTERNAL MEDICINE

## 2023-05-08 PROCEDURE — 93010 ELECTROCARDIOGRAM REPORT: CPT | Mod: ,,, | Performed by: INTERNAL MEDICINE

## 2023-05-08 PROCEDURE — 99999 PR PBB SHADOW E&M-EST. PATIENT-LVL IV: ICD-10-PCS | Mod: PBBFAC,,, | Performed by: INTERNAL MEDICINE

## 2023-05-08 PROCEDURE — 93010 EKG 12-LEAD: ICD-10-PCS | Mod: ,,, | Performed by: INTERNAL MEDICINE

## 2023-05-08 RX ORDER — OMEPRAZOLE 40 MG/1
CAPSULE, DELAYED RELEASE ORAL
COMMUNITY
End: 2023-10-06 | Stop reason: CLARIF

## 2023-05-08 RX ORDER — CHLORTHALIDONE 25 MG/1
25 TABLET ORAL DAILY
Qty: 90 TABLET | Refills: 3 | Status: SHIPPED | OUTPATIENT
Start: 2023-05-08 | End: 2024-03-27

## 2023-05-08 RX ORDER — POTASSIUM CHLORIDE 20 MEQ/1
TABLET, EXTENDED RELEASE ORAL
COMMUNITY

## 2023-05-08 NOTE — PROGRESS NOTES
Subjective:    Patient ID:  Mignon Hardy is a 57 y.o. female who presents for evaluation of Hypertension      Problem List Items Addressed This Visit          Cardiac/Vascular    Essential hypertension - Primary     Other Visit Diagnoses       Lower extremity edema                HPI    Here to establish    The patient states that she feels OK. Having some issues with leg swelling and knee issues.    No angina.  Some shortness of breath on exertion.  Activity limited by knee      LE edema history - Since last year  HTN history - A few years    Personal history of heart attack or stroke - None that she is aware of  Family history of heart disease - Dad with PPM in his 60s     Past Medical History:   Diagnosis Date    Chronic bilateral low back pain with left-sided sciatica 2/10/2021    Essential tremor 2/10/2021    Heart disease     Hypertension     Obesity        Past Surgical History:   Procedure Laterality Date    ESOPHAGEAL DILATION N/A 05/20/2022    Procedure: DILATION, ESOPHAGUS;  Surgeon: Jose Aguilera MD;  Location: [facility];  Service: Endoscopy;  Laterality: N/A;  [place]ates    ESOPHAGOGASTRODUODENOSCOPY N/A 05/20/2022    Procedure: EGD (ESOPHAGOGASTRODUODENOSCOPY);  Surgeon: Jose Aguilera MD;  Location: [facility];  Service: Endoscopy;  Laterality: N/A;    REMOVAL-MASS      of thyroid       Family History   Problem Relation Age of Onset    Heart disease Father     Tremor Father     Cancer Maternal Grandmother        Social History     Socioeconomic History    Marital status:    Tobacco Use    Smoking status: Never   Substance and Sexual Activity    Alcohol use: Never    Drug use: Never       Review of patient's allergies indicates:  No Known Allergies    Review of Systems   Constitutional: Negative for decreased appetite, fever and malaise/fatigue.   Eyes:  Negative for blurred vision.   Cardiovascular:  Negative for chest pain, dyspnea on exertion, irregular heartbeat and leg swelling.  "  Respiratory:  Negative for cough, hemoptysis, shortness of breath and wheezing.    Endocrine: Negative for cold intolerance and heat intolerance.   Hematologic/Lymphatic: Negative for bleeding problem.   Musculoskeletal:  Negative for muscle weakness and myalgias.   Gastrointestinal:  Negative for abdominal pain, constipation and diarrhea.   Genitourinary:  Negative for bladder incontinence.   Neurological:  Negative for dizziness and weakness.   Psychiatric/Behavioral:  Negative for depression.       Objective:     Vitals:    05/08/23 0851 05/08/23 0904   BP: (!) 181/95 (!) 168/93   BP Location: Left arm Left arm   Patient Position: Sitting Sitting   BP Method: Large (Automatic) Large (Automatic)   Pulse: 71 69   Weight: (!) 170.6 kg (376 lb 1.7 oz)    Height: 5' 9" (1.753 m)        BP Readings from Last 5 Encounters:   05/08/23 (!) 168/93   04/18/23 130/83   03/21/23 (!) 144/90   03/15/23 (!) 141/80   05/20/22 (!) 120/54        Physical Exam  Vitals and nursing note reviewed.   Constitutional:       General: She is not in acute distress.     Appearance: She is well-developed.   HENT:      Head: Normocephalic and atraumatic.   Neck:      Vascular: No JVD.   Cardiovascular:      Rate and Rhythm: Normal rate and regular rhythm.      Heart sounds: Normal heart sounds. No murmur heard.    No friction rub. No gallop.   Pulmonary:      Effort: Pulmonary effort is normal. No respiratory distress.      Breath sounds: Normal breath sounds. No wheezing or rales.   Abdominal:      General: Bowel sounds are normal.      Palpations: Abdomen is soft.      Tenderness: There is no abdominal tenderness. There is no guarding or rebound.   Musculoskeletal:         General: No tenderness.      Cervical back: Neck supple.      Right lower leg: Edema present.      Left lower leg: Edema present.   Skin:     General: Skin is warm and dry.   Neurological:      Mental Status: She is alert and oriented to person, place, and time. "   Psychiatric:         Behavior: Behavior normal.           Current Outpatient Medications   Medication Instructions    ergocalciferol (ERGOCALCIFEROL) 50,000 Units, Oral, As needed (PRN)    ibuprofen (ADVIL,MOTRIN) 600 mg, Oral, Every 8 hours PRN    ibuprofen (ADVIL,MOTRIN) 800 mg, Oral, As needed (PRN)    meloxicam (MOBIC) 15 mg, Oral, Daily    omeprazole (PRILOSEC) 40 MG capsule omeprazole 40 mg capsule,delayed release   TAKE ONE CAPSULE BY MOUTH ONCE DAILY.    potassium chloride SA (K-DUR,KLOR-CON) 20 MEQ tablet potassium chloride ER 20 mEq tablet,extended release(part/cryst)   TAKE ONE TABLET BY MOUTH ONCE DAILY    pregabalin (LYRICA) 50 mg, Oral, 3 times daily       Lipid Panel:   Lab Results   Component Value Date    CHOL 162 01/03/2008    HDL 52 01/03/2008    LDLCALC 97.0 01/03/2008    TRIG 65 01/03/2008    CHOLHDL 32.1 01/03/2008         The ASCVD Risk score (Colton DK, et al., 2019) failed to calculate for the following reasons:    Cannot find a previous HDL lab    Cannot find a previous total cholesterol lab    All pertinent labs, imaging, and EKGs reviewed.  Patient's most recent EKG tracing was personally interpreted by this provider.    Most Recent EKG Results  No results found for this or any previous visit.    Most Recent Echocardiogram Results  No results found for this or any previous visit.      Most Recent Nuclear Stress Test Results  No results found for this or any previous visit.      Most Recent Cardiac PET Stress Test Results  No results found for this or any previous visit.      Most Recent Cardiovascular Angiogram results  No results found for this or any previous visit.      Other Most Recent Cardiology Results  No results found for this or any previous visit.        Assessment:       1. Essential hypertension    2. Lower extremity edema         Plan:     Symptoms OK today  BP elevated  Most recent lipid panel reviewed personally     Echocardiogram   Lipid panel   CXR/BNP   Bilateral  lower extremity venous Doppler   Change hydrochlorothiazide 25 mg PO Daily to chlorthalidone 25 mg PO Daily - Educated on risks/benefits of medication   BMP/magnesium in 1 week   Home BP log     Continue other cardiac medications  Mediterranean Diet/Cardiovascular Exercise Program    Patient queried and all questions were answered.    F/u in 4 months to reassess      Signed:    Chivo Maldonado MD  5/8/2023 7:29 PM

## 2023-05-09 ENCOUNTER — HOSPITAL ENCOUNTER (OUTPATIENT)
Facility: HOSPITAL | Age: 58
Discharge: HOME | End: 2023-05-09
Attending: ORTHOPAEDIC SURGERY | Admitting: ORTHOPAEDIC SURGERY
Payer: COMMERCIAL

## 2023-05-09 VITALS
BODY MASS INDEX: 43.4 KG/M2 | SYSTOLIC BLOOD PRESSURE: 114 MMHG | OXYGEN SATURATION: 96 % | HEART RATE: 64 BPM | HEIGHT: 69 IN | DIASTOLIC BLOOD PRESSURE: 56 MMHG | TEMPERATURE: 98 F | WEIGHT: 293 LBS | RESPIRATION RATE: 16 BRPM

## 2023-05-09 DIAGNOSIS — G56.01 CARPAL TUNNEL SYNDROME OF RIGHT WRIST: ICD-10-CM

## 2023-05-09 PROCEDURE — 36000707: Mod: PO | Performed by: ORTHOPAEDIC SURGERY

## 2023-05-09 PROCEDURE — 63600175 PHARM REV CODE 636 W HCPCS: Mod: PO | Performed by: PHYSICIAN ASSISTANT

## 2023-05-09 PROCEDURE — 63600175 PHARM REV CODE 636 W HCPCS: Mod: PO | Performed by: ORTHOPAEDIC SURGERY

## 2023-05-09 PROCEDURE — 36000706: Mod: PO | Performed by: ORTHOPAEDIC SURGERY

## 2023-05-09 PROCEDURE — 64721 PR REVISE MEDIAN N/CARPAL TUNNEL SURG: ICD-10-PCS | Mod: RT,,, | Performed by: ORTHOPAEDIC SURGERY

## 2023-05-09 PROCEDURE — 64721 CARPAL TUNNEL SURGERY: CPT | Mod: RT,,, | Performed by: ORTHOPAEDIC SURGERY

## 2023-05-09 PROCEDURE — 25000003 PHARM REV CODE 250: Mod: PO | Performed by: ORTHOPAEDIC SURGERY

## 2023-05-09 RX ORDER — SODIUM CHLORIDE, SODIUM LACTATE, POTASSIUM CHLORIDE, CALCIUM CHLORIDE 600; 310; 30; 20 MG/100ML; MG/100ML; MG/100ML; MG/100ML
INJECTION, SOLUTION INTRAVENOUS CONTINUOUS
Status: DISCONTINUED | OUTPATIENT
Start: 2023-05-09 | End: 2023-05-09 | Stop reason: HOSPADM

## 2023-05-09 RX ORDER — BUPIVACAINE HYDROCHLORIDE 5 MG/ML
INJECTION, SOLUTION EPIDURAL; INTRACAUDAL
Status: DISCONTINUED | OUTPATIENT
Start: 2023-05-09 | End: 2023-05-09 | Stop reason: HOSPADM

## 2023-05-09 RX ORDER — LIDOCAINE HYDROCHLORIDE 10 MG/ML
INJECTION, SOLUTION EPIDURAL; INFILTRATION; INTRACAUDAL; PERINEURAL
Status: DISCONTINUED | OUTPATIENT
Start: 2023-05-09 | End: 2023-05-09 | Stop reason: HOSPADM

## 2023-05-09 RX ORDER — HYDROCODONE BITARTRATE AND ACETAMINOPHEN 5; 325 MG/1; MG/1
1 TABLET ORAL EVERY 6 HOURS PRN
Qty: 6 TABLET | Refills: 0 | Status: ON HOLD | OUTPATIENT
Start: 2023-05-09 | End: 2023-08-03 | Stop reason: HOSPADM

## 2023-05-09 RX ADMIN — SODIUM CHLORIDE, POTASSIUM CHLORIDE, SODIUM LACTATE AND CALCIUM CHLORIDE: 600; 310; 30; 20 INJECTION, SOLUTION INTRAVENOUS at 10:05

## 2023-05-09 NOTE — OP NOTE
Mignon Hardy  1965    DATE OF SURGERY: 5/9/2023     PRE-OPERATIVE DIAGNOSIS: right Carpal Tunnel Syndrome    POST-OPERATIVE DIAGNOSIS: right Carpal Tunnel Syndrome     ANESTHESIA TYPE: Local    BLOOD LOSS: less than 10 cc    TOURNIQUET TIME: 8 min    SURGEON: Dr. Sahni    ASSISTANT: Kari Lau    PROCEDURE: right Carpal Tunnel Release    IMPLANTS: None    SPECIMENS: None    INDICATION:     Ms. Hardy presented to my clinic with a history of right carpal tunnel symptoms. Conservative treatments were initially tried. The patient did have relief with attempts at conservative treatment however has had a return of symptoms. An EMG and nerve conduction study has been performed. That study has shown compression of the median nerve at the wrist consistent with carpal tunnel syndrome. A discussion of the risks and benefits of carpal tunnel release has been performed, and informed consent for the procedure has been obtained.    PROCEDURE IN DETAIL:     Ms. Hardy was transported to the operating room and was placed supine on the operating room table. All appropriate points were padded. The right hand and arm was prepped and draped in the normal sterile fashion. Time out was called. The correct patient, correct operative site, correct procedure, antibiotic administration which consisted of 2G ancef, and allergies to medications which are to Patient has no known allergies.  were reviewed. Time in was then called.     Attention was turned to right hand where a 3 cm incision was made in the palm of the hand. This was carried through the skin and subcutaneous tissues were dissected bluntly. The superficial palmar fascia was identified and was split in line with its fibers. The transverse carpal ligament was then identified and was incised for a distance of approximately 1 cm sharply.The median nerve was visualized and a carpal tunnel sled was placed below the transverse carpal ligament but above the median  nerve. Blunt dissection proximally over the transverse carpal ligament was performed and elevator was placed just above the transverse carpal ligament proximally. The ligament was identified. There were noted to be no penetrating nerve branches and at that point the carpal tunnel knife was used to incise the proximal transverse carpal ligament. Attention was then turned to the distal portion of the transverse carpal ligament. The carpal tunnel sled was again placed underneath the transverse carpal ligament but above the median nerve distally. Blunt dissection above the transverse carpal ligament distally was performed and an elevator was placed. The distal portion of the transverse carpal ligament was visualized and there were noted to be no penetrating branches of the nerve. At that point, tenotomy scissors were used to transect the distal portion of the transverse carpal ligament under direct visualization. The median nerve was then visualized. There were noted to be no specific lesions of the nerve and all of its branches were noted to be intact. The wound was then irrigated copiously. The tourniquet was let down and meticulous hemostasis was obtained with bipolar cautery. The wound was closed with 4-0 nylon suture superficially. The wound was then dressed with Xeroform, 4 x 4's, cast padding and a 3 inch Ace wrap was placed.      The patient was stable in the operating room and was transported to the recovery room in stable condition. All lap, needle, sponge, and equipment counts were correct at the end of the case.    POST-OPERATIVE PLAN:     The patient will keep a soft dressing in place for two weeks at which time she will followup with me. The dressing will be taken down, and the sutures will be removed at that time. She is not to get the dressing wet or to take it off. She will have a 2 pound weight limit of the left upper extremity for a total of 4 weeks.

## 2023-05-09 NOTE — DISCHARGE SUMMARY
[facility] - Surgery  Discharge Note  Short Stay    Procedure(s) (LRB):  Right carpal tunnel release (Right)      OUTCOME: Patient tolerated treatment/procedure well without complication and is now ready for discharge.    DISPOSITION: Home or Self Care    FINAL DIAGNOSIS:  Carpal tunnel syndrome of right wrist    FOLLOWUP: In clinic    DISCHARGE INSTRUCTIONS:    Discharge Procedure Orders   Diet general     Activity as tolerated     Keep surgical extremity elevated     Lifting restrictions   Order Comments: Please limit lifting with the right arm and hand to 2-3 lb     Leave dressing on - Keep it clean, dry, and intact until clinic visit     Call MD for:  temperature >100.4     Call MD for:  persistent nausea and vomiting     Call MD for:  severe uncontrolled pain     Call MD for:  difficulty breathing, headache or visual disturbances     Call MD for:  redness, tenderness, or signs of infection (pain, swelling, redness, odor or green/yellow discharge around incision site)     Call MD for:  hives     Call MD for:  persistent dizziness or light-headedness     Call MD for:  extreme fatigue        TIME SPENT ON DISCHARGE: 15 minutes

## 2023-05-09 NOTE — PATIENT INSTRUCTIONS
Procedure: right carpal tunnel release    1. Please keep the dressing clean, dry, and in place. Do not take it off and do not get it wet.    2. Please keep the right arm and hand elevated for the 1st 24-48 hours to prevent swelling    3. Flexion and extension of the exposed fingers is encouraged, but do not attempt to push off or lift more than 1-2 pounds with right arm or hand    4. Please limit weightbearing to the right hand to 1-2 pounds. Light activity such as brushing your teeth, using a fork, or lifting a small drink is allowed starting today.    5. Pain medication has been prescribed. Please take them as necessary    6. If there are any questions or concerns please call Dr. Sahni's office at [phone]    7.  Follow up with Dr. Sahni in 2 weeks

## 2023-05-15 ENCOUNTER — PATIENT MESSAGE (OUTPATIENT)
Dept: CARDIOLOGY | Facility: CLINIC | Age: 58
End: 2023-05-15

## 2023-05-15 ENCOUNTER — LAB VISIT (OUTPATIENT)
Dept: LAB | Facility: HOSPITAL | Age: 58
End: 2023-05-15
Attending: INTERNAL MEDICINE
Payer: COMMERCIAL

## 2023-05-15 ENCOUNTER — CLINICAL SUPPORT (OUTPATIENT)
Dept: CARDIOLOGY | Facility: HOSPITAL | Age: 58
End: 2023-05-15
Attending: INTERNAL MEDICINE
Payer: COMMERCIAL

## 2023-05-15 ENCOUNTER — PATIENT MESSAGE (OUTPATIENT)
Dept: ORTHOPEDICS | Facility: CLINIC | Age: 58
End: 2023-05-15
Payer: COMMERCIAL

## 2023-05-15 ENCOUNTER — PATIENT MESSAGE (OUTPATIENT)
Dept: CARDIOLOGY | Facility: CLINIC | Age: 58
End: 2023-05-15
Payer: COMMERCIAL

## 2023-05-15 VITALS — BODY MASS INDEX: 43.4 KG/M2 | HEART RATE: 65 BPM | WEIGHT: 293 LBS | HEIGHT: 69 IN

## 2023-05-15 DIAGNOSIS — R06.02 SHORTNESS OF BREATH: ICD-10-CM

## 2023-05-15 DIAGNOSIS — I10 ESSENTIAL HYPERTENSION: ICD-10-CM

## 2023-05-15 DIAGNOSIS — R60.0 LOWER EXTREMITY EDEMA: ICD-10-CM

## 2023-05-15 DIAGNOSIS — I10 ESSENTIAL HYPERTENSION: Primary | ICD-10-CM

## 2023-05-15 LAB
ANION GAP SERPL CALC-SCNC: 10 MMOL/L (ref 8–16)
ASCENDING AORTA: 2.59 CM
AV INDEX (PROSTH): 0.68
AV MEAN GRADIENT: 6 MMHG
AV PEAK GRADIENT: 10 MMHG
AV VALVE AREA: 2.7 CM2
AV VELOCITY RATIO: 0.69
BSA FOR ECHO PROCEDURE: 2.88 M2
BUN SERPL-MCNC: 16 MG/DL (ref 6–20)
CALCIUM SERPL-MCNC: 9.6 MG/DL (ref 8.7–10.5)
CHLORIDE SERPL-SCNC: 99 MMOL/L (ref 95–110)
CO2 SERPL-SCNC: 33 MMOL/L (ref 23–29)
CREAT SERPL-MCNC: 0.8 MG/DL (ref 0.5–1.4)
CV ECHO LV RWT: 0.37 CM
DOP CALC AO PEAK VEL: 1.61 M/S
DOP CALC AO VTI: 42.3 CM
DOP CALC LVOT AREA: 4 CM2
DOP CALC LVOT DIAMETER: 2.25 CM
DOP CALC LVOT PEAK VEL: 1.11 M/S
DOP CALC LVOT STROKE VOLUME: 114.06 CM3
DOP CALCLVOT PEAK VEL VTI: 28.7 CM
E WAVE DECELERATION TIME: 206.13 MSEC
E/A RATIO: 1.02
E/E' RATIO: 9.27 M/S
ECHO LV POSTERIOR WALL: 1.1 CM (ref 0.6–1.1)
EJECTION FRACTION: 60 %
EST. GFR  (NO RACE VARIABLE): >60 ML/MIN/1.73 M^2
FRACTIONAL SHORTENING: 32 % (ref 28–44)
GLUCOSE SERPL-MCNC: 101 MG/DL (ref 70–110)
INTERVENTRICULAR SEPTUM: 0.95 CM (ref 0.6–1.1)
IVRT: 97.05 MSEC
LA MAJOR: 5.61 CM
LA MINOR: 5.15 CM
LA WIDTH: 4.4 CM
LEFT ATRIUM SIZE: 4.18 CM
LEFT ATRIUM VOLUME INDEX: 31.1 ML/M2
LEFT ATRIUM VOLUME: 83.95 CM3
LEFT INTERNAL DIMENSION IN SYSTOLE: 4.08 CM (ref 2.1–4)
LEFT VENTRICLE DIASTOLIC VOLUME INDEX: 66.25 ML/M2
LEFT VENTRICLE DIASTOLIC VOLUME: 178.88 ML
LEFT VENTRICLE MASS INDEX: 94 G/M2
LEFT VENTRICLE SYSTOLIC VOLUME INDEX: 27.1 ML/M2
LEFT VENTRICLE SYSTOLIC VOLUME: 73.28 ML
LEFT VENTRICULAR INTERNAL DIMENSION IN DIASTOLE: 5.98 CM (ref 3.5–6)
LEFT VENTRICULAR MASS: 253.47 G
LV LATERAL E/E' RATIO: 7.85 M/S
LV SEPTAL E/E' RATIO: 11.33 M/S
LVOT MG: 3.28 MMHG
LVOT MV: 0.87 CM/S
MAGNESIUM SERPL-MCNC: 2.1 MG/DL (ref 1.6–2.6)
MV PEAK A VEL: 1 M/S
MV PEAK E VEL: 1.02 M/S
MV STENOSIS PRESSURE HALF TIME: 59.78 MS
MV VALVE AREA P 1/2 METHOD: 3.68 CM2
PISA MRMAX VEL: 5.41 M/S
PISA TR MAX VEL: 3.05 M/S
POTASSIUM SERPL-SCNC: 4.1 MMOL/L (ref 3.5–5.1)
PULM VEIN S/D RATIO: 1.34
PV PEAK D VEL: 0.41 M/S
PV PEAK S VEL: 0.55 M/S
RA MAJOR: 4.05 CM
RA PRESSURE: 3 MMHG
RA WIDTH: 2.8 CM
RIGHT VENTRICULAR END-DIASTOLIC DIMENSION: 3.41 CM
RIGHT VENTRICULAR LENGTH IN DIASTOLE (APICAL 4-CHAMBER VIEW): 5.05 CM
RV MID DIAMA: 3.32 CM
RV TISSUE DOPPLER FREE WALL SYSTOLIC VELOCITY 1 (APICAL 4 CHAMBER VIEW): 0.01 CM/S
SINUS: 2.96 CM
SODIUM SERPL-SCNC: 142 MMOL/L (ref 136–145)
STJ: 2.47 CM
TDI LATERAL: 0.13 M/S
TDI SEPTAL: 0.09 M/S
TDI: 0.11 M/S
TR MAX PG: 37 MMHG
TRICUSPID ANNULAR PLANE SYSTOLIC EXCURSION: 2.57 CM
TV REST PULMONARY ARTERY PRESSURE: 40 MMHG

## 2023-05-15 PROCEDURE — 80048 BASIC METABOLIC PNL TOTAL CA: CPT | Performed by: INTERNAL MEDICINE

## 2023-05-15 PROCEDURE — 83735 ASSAY OF MAGNESIUM: CPT | Performed by: INTERNAL MEDICINE

## 2023-05-15 PROCEDURE — 93306 TTE W/DOPPLER COMPLETE: CPT | Mod: PO

## 2023-05-15 PROCEDURE — 93970 EXTREMITY STUDY: CPT | Mod: 26,,, | Performed by: INTERNAL MEDICINE

## 2023-05-15 PROCEDURE — 93306 ECHO (CUPID ONLY): ICD-10-PCS | Mod: 26,,, | Performed by: INTERNAL MEDICINE

## 2023-05-15 PROCEDURE — 36415 COLL VENOUS BLD VENIPUNCTURE: CPT | Mod: PO | Performed by: INTERNAL MEDICINE

## 2023-05-15 PROCEDURE — 93970 CV US DOPPLER VENOUS LEGS BILATERAL (CUPID ONLY): ICD-10-PCS | Mod: 26,,, | Performed by: INTERNAL MEDICINE

## 2023-05-15 PROCEDURE — 93970 EXTREMITY STUDY: CPT | Mod: PO

## 2023-05-15 PROCEDURE — 93306 TTE W/DOPPLER COMPLETE: CPT | Mod: 26,,, | Performed by: INTERNAL MEDICINE

## 2023-05-22 ENCOUNTER — PATIENT MESSAGE (OUTPATIENT)
Dept: NEUROLOGY | Facility: CLINIC | Age: 58
End: 2023-05-22
Payer: COMMERCIAL

## 2023-05-24 ENCOUNTER — OFFICE VISIT (OUTPATIENT)
Dept: ORTHOPEDICS | Facility: CLINIC | Age: 58
End: 2023-05-24
Payer: COMMERCIAL

## 2023-05-24 DIAGNOSIS — Z98.890 STATUS POST CARPAL TUNNEL RELEASE: Primary | ICD-10-CM

## 2023-05-24 PROCEDURE — 99024 POSTOP FOLLOW-UP VISIT: CPT | Mod: S$GLB,,, | Performed by: ORTHOPAEDIC SURGERY

## 2023-05-24 PROCEDURE — 99999 PR PBB SHADOW E&M-EST. PATIENT-LVL II: CPT | Mod: PBBFAC,,, | Performed by: ORTHOPAEDIC SURGERY

## 2023-05-24 PROCEDURE — 99024 PR POST-OP FOLLOW-UP VISIT: ICD-10-PCS | Mod: S$GLB,,, | Performed by: ORTHOPAEDIC SURGERY

## 2023-05-24 PROCEDURE — 99999 PR PBB SHADOW E&M-EST. PATIENT-LVL II: ICD-10-PCS | Mod: PBBFAC,,, | Performed by: ORTHOPAEDIC SURGERY

## 2023-05-24 NOTE — PROGRESS NOTES
Ms. Hardy returns to clinic today.  She is status post right carpal tunnel release.  She is overall doing well on the right side.  States that she continues to have some symptoms on the left     Physical exam:  Examination the right hand and wrist reveals that the incision is healing well.  There are sutures in place.  There is only mild edema.  There is no erythema or drainage.  She does report intact sensation over all the fingers.  She has capillary refill less than 2 seconds     Assessment:  Status post right carpal tunnel release     Plan:    1.  Sutures were removed today and Steri-Strips are placed    2.  She will continue with 2-3 lb weight limit    3.  She will follow up in 3 weeks for repeat evaluation.  If she is doing very well from the right carpal tunnel release we will consider scheduling her for her left carpal tunnel release.

## 2023-05-30 RX ORDER — PREGABALIN 50 MG/1
50 CAPSULE ORAL 3 TIMES DAILY
Qty: 90 CAPSULE | Refills: 3 | Status: SHIPPED | OUTPATIENT
Start: 2023-05-30 | End: 2023-07-31 | Stop reason: SDUPTHER

## 2023-05-31 ENCOUNTER — PATIENT MESSAGE (OUTPATIENT)
Dept: PAIN MEDICINE | Facility: CLINIC | Age: 58
End: 2023-05-31
Payer: COMMERCIAL

## 2023-06-01 ENCOUNTER — PATIENT MESSAGE (OUTPATIENT)
Dept: PAIN MEDICINE | Facility: CLINIC | Age: 58
End: 2023-06-01
Payer: COMMERCIAL

## 2023-06-06 ENCOUNTER — PATIENT MESSAGE (OUTPATIENT)
Dept: PAIN MEDICINE | Facility: HOSPITAL | Age: 58
End: 2023-06-06
Payer: COMMERCIAL

## 2023-06-06 ENCOUNTER — TELEPHONE (OUTPATIENT)
Dept: PAIN MEDICINE | Facility: CLINIC | Age: 58
End: 2023-06-06
Payer: COMMERCIAL

## 2023-06-06 ENCOUNTER — PATIENT MESSAGE (OUTPATIENT)
Dept: CARDIOLOGY | Facility: CLINIC | Age: 58
End: 2023-06-06
Payer: COMMERCIAL

## 2023-06-06 RX ORDER — HYDROCHLOROTHIAZIDE 25 MG/1
25 TABLET ORAL DAILY
Qty: 30 TABLET | Refills: 11 | Status: SHIPPED | OUTPATIENT
Start: 2023-06-06 | End: 2023-10-06 | Stop reason: CLARIF

## 2023-06-07 ENCOUNTER — HOSPITAL ENCOUNTER (OUTPATIENT)
Facility: HOSPITAL | Age: 58
Discharge: HOME | End: 2023-06-07
Attending: STUDENT IN AN ORGANIZED HEALTH CARE EDUCATION/TRAINING PROGRAM | Admitting: STUDENT IN AN ORGANIZED HEALTH CARE EDUCATION/TRAINING PROGRAM
Payer: COMMERCIAL

## 2023-06-07 VITALS
OXYGEN SATURATION: 100 % | HEIGHT: 69 IN | BODY MASS INDEX: 43.4 KG/M2 | SYSTOLIC BLOOD PRESSURE: 137 MMHG | RESPIRATION RATE: 17 BRPM | DIASTOLIC BLOOD PRESSURE: 63 MMHG | TEMPERATURE: 98 F | WEIGHT: 293 LBS | HEART RATE: 77 BPM

## 2023-06-07 DIAGNOSIS — M54.12 CERVICAL RADICULOPATHY: Primary | ICD-10-CM

## 2023-06-07 DIAGNOSIS — G89.29 CHRONIC PAIN: ICD-10-CM

## 2023-06-07 LAB
B-HCG UR QL: NEGATIVE
CTP QC/QA: YES

## 2023-06-07 PROCEDURE — 62321 PR INJ CERV/THORAC, W/GUIDANCE: ICD-10-PCS | Mod: ,,, | Performed by: STUDENT IN AN ORGANIZED HEALTH CARE EDUCATION/TRAINING PROGRAM

## 2023-06-07 PROCEDURE — 25000003 PHARM REV CODE 250: Performed by: STUDENT IN AN ORGANIZED HEALTH CARE EDUCATION/TRAINING PROGRAM

## 2023-06-07 PROCEDURE — 62321 NJX INTERLAMINAR CRV/THRC: CPT | Performed by: STUDENT IN AN ORGANIZED HEALTH CARE EDUCATION/TRAINING PROGRAM

## 2023-06-07 PROCEDURE — 63600175 PHARM REV CODE 636 W HCPCS: Performed by: STUDENT IN AN ORGANIZED HEALTH CARE EDUCATION/TRAINING PROGRAM

## 2023-06-07 PROCEDURE — 81025 URINE PREGNANCY TEST: CPT | Performed by: STUDENT IN AN ORGANIZED HEALTH CARE EDUCATION/TRAINING PROGRAM

## 2023-06-07 PROCEDURE — 25500020 PHARM REV CODE 255: Performed by: STUDENT IN AN ORGANIZED HEALTH CARE EDUCATION/TRAINING PROGRAM

## 2023-06-07 PROCEDURE — 62321 NJX INTERLAMINAR CRV/THRC: CPT | Mod: ,,, | Performed by: STUDENT IN AN ORGANIZED HEALTH CARE EDUCATION/TRAINING PROGRAM

## 2023-06-07 RX ORDER — ALPRAZOLAM 0.5 MG/1
0.5 TABLET ORAL
Status: DISCONTINUED | OUTPATIENT
Start: 2023-06-07 | End: 2023-06-07

## 2023-06-07 RX ORDER — LIDOCAINE HYDROCHLORIDE 10 MG/ML
INJECTION, SOLUTION EPIDURAL; INFILTRATION; INTRACAUDAL; PERINEURAL
Status: DISCONTINUED | OUTPATIENT
Start: 2023-06-07 | End: 2023-06-07 | Stop reason: HOSPADM

## 2023-06-07 RX ORDER — LIDOCAINE HYDROCHLORIDE 20 MG/ML
INJECTION, SOLUTION EPIDURAL; INFILTRATION; INTRACAUDAL; PERINEURAL
Status: DISCONTINUED | OUTPATIENT
Start: 2023-06-07 | End: 2023-06-07 | Stop reason: HOSPADM

## 2023-06-07 RX ORDER — ALPRAZOLAM 0.5 MG/1
0.5 TABLET, ORALLY DISINTEGRATING ORAL ONCE AS NEEDED
Status: COMPLETED | OUTPATIENT
Start: 2023-06-07 | End: 2023-06-07

## 2023-06-07 RX ORDER — DEXAMETHASONE SODIUM PHOSPHATE 10 MG/ML
INJECTION INTRAMUSCULAR; INTRAVENOUS
Status: DISCONTINUED | OUTPATIENT
Start: 2023-06-07 | End: 2023-06-07 | Stop reason: HOSPADM

## 2023-06-07 RX ADMIN — ALPRAZOLAM 0.5 MG: 0.5 TABLET, ORALLY DISINTEGRATING ORAL at 12:06

## 2023-06-07 NOTE — OP NOTE
Cervical Interlaminar Epidural Steroid Injection under Fluoroscopic Guidance    The procedure, risks, benefits, and options were discussed with the patient. There are no contraindications to the procedure. The patent expressed understanding and agreed to the procedure. Informed written consent was obtained prior to the start of the procedure and can be found in the patient's chart.     PATIENT NAME: Mignon Hardy   MRN: [id number]     DATE OF PROCEDURE: 06/07/2023    PROCEDURE: Cervical Interlaminar Epidural Steroid Injection C7/T1 under Fluoroscopic Guidance    PRE-OP DIAGNOSIS: Cervical radiculopathy [M54.12] Cervical radiculopathy [M54.12]    POST-OP DIAGNOSIS: Same    PHYSICIAN: Aileen Ocampo DO     ASSISTANTS: None    MEDICATIONS INJECTED: Preservative-free Decadron 10mg with 1cc of Lidocaine 1% MPF and preservative free normal saline    LOCAL ANESTHETIC INJECTED: Xylocaine 2%     SEDATION: None    ESTIMATED BLOOD LOSS: None    COMPLICATIONS: None    TECHNIQUE: Time-out was performed to identify the patient and procedure to be performed. With the patient laying in a prone position, the surgical area was prepped and draped in the usual sterile fashion using ChloraPrep and a fenestrated drape. The level was determined under fluoroscopy guidance. Skin anesthesia was achieved by injecting Lidocaine 2% over the injection site.  The interlaminar space was then approached with a 20 gauge, 3.5 inch Tuohy needle that was introduced under fluoroscopic guidance with AP, lateral and/or contralateral oblique imaging. Once the Ligamentum flavum was encountered loss of resistance to air was used to enter the epidural space. With positive loss of resistance and negative aspiration for CSF or Blood, contrast dye  Omnipaque (240mg/mL) was injected to confirm placement and there was no vascular runoff. Then 3 mL of the medication mixture listed above was then injected slowly. Displacement of the radio opaque contrast after  injection of the medication confirmed that the medication went into the area of the epidural space. The needles were removed, and bleeding was nil. A sterile dressing was applied. No specimens collected. The patient tolerated the procedure well.       The patient was monitored after the procedure in the recovery area. They were given post-procedure and discharge instructions to follow at home. The patient was discharged in a stable condition.      Aileen Ocampo DO

## 2023-06-07 NOTE — DISCHARGE INSTRUCTIONS
Home Care Instructions Pain Management:    1.  DIET:    You may resume your normal diet today.    2.  BATHING:    You may shower with luke warm water.    3.  DRESSING:    You may remove your bandage today.    4.  ACTIVITY LEVEL:      You may resume your normal activities 24 hours after your procedure.    5.  MEDICATIONS:    You may resume your normal medications today.    6.  SPECIAL INSTRUCTIONS:    No heat to the injection site for 24 hours including bath or shower, heating pad, moist heat or hot tubs.    Use an ice pack to the injection site for any pain or discomfort.  Apply ice packs for 20 minute intervals as needed.    If you have received any sedatives by mouth today, you can not drive for 12 hours.    If you have received sedation through an IV, you can not drive for 24 hours.    PLEASE CALL YOUR DOCTOR FOR THE FOLLOWIN.  Redness or swelling around the injection site.  2.  Fever of 101 degrees.  3.  Drainage (pus) from the injection site.  4.  For any continuous bleeding (some dried blood over the incision is normal.)    FOR EMERGENCIES:    If any unusual problems or difficulties occur during clinic hours, call [phone] or dial [phone].    Follow up with with your physician in 2-3 weeks.

## 2023-06-07 NOTE — H&P
HPI  Patient presenting for Procedure(s) (LRB):  Injection-steroid-epidural-cervical C7-T1 (N/A)     Patient on Anti-coagulation No    No health changes since previous encounter    Past Medical History:   Diagnosis Date    Chronic bilateral low back pain with left-sided sciatica 2/10/2021    Essential tremor 2/10/2021    Heart disease     Hypertension     Obesity      Past Surgical History:   Procedure Laterality Date    CARPAL TUNNEL RELEASE Right 5/9/2023    Procedure: Right carpal tunnel release;  Surgeon: Kash Sahni MD;  Location: [facility] OR;  Service: Orthopedics;  Laterality: Right;    ESOPHAGEAL DILATION N/A 05/20/2022    Procedure: DILATION, ESOPHAGUS;  Surgeon: Jose Aguilera MD;  Location: [facility] ENDO;  Service: Endoscopy;  Laterality: N/A;  [place]    ESOPHAGOGASTRODUODENOSCOPY N/A 05/20/2022    Procedure: EGD (ESOPHAGOGASTRODUODENOSCOPY);  Surgeon: Jose Aguilera MD;  Location: [facility] ENDO;  Service: Endoscopy;  Laterality: N/A;    REMOVAL-MASS      of thyroid     Review of patient's allergies indicates:  No Known Allergies   No current facility-administered medications for this encounter.       PMHx, PSHx, Allergies, Medications reviewed in epic    ROS negative except pain complaints in HPI    OBJECTIVE:    LMP 07/31/2020 (Approximate)     PHYSICAL EXAMINATION:    GENERAL: Well appearing, in no acute distress, alert and oriented x3.  PSYCH:  Mood and affect appropriate.  SKIN: Skin color, texture, turgor normal, no rashes or lesions which will impact the procedure.  CV: RRR with palpation of the radial artery.  PULM: No evidence of respiratory difficulty, symmetric chest rise. Clear to auscultation.  NEURO: Cranial nerves grossly intact.    Plan:    Proceed with procedure as planned Procedure(s) (LRB):  Injection-steroid-epidural-cervical C7-T1 (N/A)    Aileen Ocampo  06/07/2023

## 2023-06-07 NOTE — DISCHARGE SUMMARY
Discharge Note  Short Stay      SUMMARY     Admit Date: 6/7/2023    Attending Physician: Aileen Ocampo      Discharge Physician: Aileen Ocampo      Discharge Date: 6/7/2023 12:45 PM    Procedure(s) (LRB):  Injection-steroid-epidural-cervical C7-T1 (N/A)    Final Diagnosis: Cervical radiculopathy [M54.12]    Disposition: Home or self care    Patient Instructions:   Current Discharge Medication List        CONTINUE these medications which have NOT CHANGED    Details   chlorthalidone (HYGROTEN) 25 MG Tab Take 1 tablet (25 mg total) by mouth once daily.  Qty: 90 tablet, Refills: 3    Comments: .      hydroCHLOROthiazide (HYDRODIURIL) 25 MG tablet Take 1 tablet (25 mg total) by mouth once daily.  Qty: 30 tablet, Refills: 11    Comments: .      ergocalciferol (ERGOCALCIFEROL) 50,000 unit Cap Take 50,000 Units by mouth as needed.      HYDROcodone-acetaminophen (NORCO) 5-325 mg per tablet Take 1 tablet by mouth every 6 (six) hours as needed for Pain.  Qty: 6 tablet, Refills: 0    Comments: Quantity prescribed more than 7 day supply? No      !! ibuprofen (ADVIL,MOTRIN) 200 MG tablet Take 600 mg by mouth every 8 (eight) hours as needed.      !! ibuprofen (ADVIL,MOTRIN) 800 MG tablet Take 1 tablet (800 mg total) by mouth as needed for Pain.  Qty: 30 tablet, Refills: 0    Associated Diagnoses: Pain in both knees, unspecified chronicity      meloxicam (MOBIC) 15 MG tablet Take 1 tablet (15 mg total) by mouth once daily.  Qty: 28 tablet, Refills: 0    Associated Diagnoses: Carpal tunnel syndrome on both sides      omeprazole (PRILOSEC) 40 MG capsule omeprazole 40 mg capsule,delayed release   TAKE ONE CAPSULE BY MOUTH ONCE DAILY.      potassium chloride SA (K-DUR,KLOR-CON) 20 MEQ tablet potassium chloride ER 20 mEq tablet,extended release(part/cryst)   TAKE ONE TABLET BY MOUTH ONCE DAILY      pregabalin (LYRICA) 50 MG capsule TAKE 1 CAPSULE (50 MG TOTAL) BY MOUTH 3 (THREE) TIMES DAILY.  Qty: 90 capsule, Refills: 3       !!  - Potential duplicate medications found. Please discuss with provider.              Discharge Diagnosis: Cervical radiculopathy [M54.12]  Condition on Discharge: Stable with no complications to procedure   Diet on Discharge: Same as before.  Activity: as per instruction sheet.  Discharge to: Home with a responsible adult.  Follow up: 2-4 weeks       Please call my office or pager at [phone] if experienced any weakness or loss of sensation, fever > 101.5, pain uncontrolled with oral medications, persistent nausea/vomiting/or diarrhea, redness or drainage from the incisions, or any other worrisome concerns. If physician on call was not reached or could not communicate with our office for any reason please go to the nearest emergency department

## 2023-06-13 ENCOUNTER — PATIENT MESSAGE (OUTPATIENT)
Dept: PAIN MEDICINE | Facility: CLINIC | Age: 58
End: 2023-06-13
Payer: COMMERCIAL

## 2023-06-13 ENCOUNTER — OFFICE VISIT (OUTPATIENT)
Dept: ORTHOPEDICS | Facility: CLINIC | Age: 58
End: 2023-06-13
Payer: COMMERCIAL

## 2023-06-13 DIAGNOSIS — Z98.890 STATUS POST CARPAL TUNNEL RELEASE: Primary | ICD-10-CM

## 2023-06-13 PROCEDURE — 99024 PR POST-OP FOLLOW-UP VISIT: ICD-10-PCS | Mod: S$GLB,,, | Performed by: PHYSICIAN ASSISTANT

## 2023-06-13 PROCEDURE — 99999 PR PBB SHADOW E&M-EST. PATIENT-LVL III: ICD-10-PCS | Mod: PBBFAC,,, | Performed by: PHYSICIAN ASSISTANT

## 2023-06-13 PROCEDURE — 99999 PR PBB SHADOW E&M-EST. PATIENT-LVL III: CPT | Mod: PBBFAC,,, | Performed by: PHYSICIAN ASSISTANT

## 2023-06-13 PROCEDURE — 99024 POSTOP FOLLOW-UP VISIT: CPT | Mod: S$GLB,,, | Performed by: PHYSICIAN ASSISTANT

## 2023-06-13 NOTE — TELEPHONE ENCOUNTER
Pt state she is still feeling sharp burning pain in left shoulder. Pt state she was aware that I could take a week to start feeling some relief. Should I get pt to come in to be evaluated.

## 2023-06-26 ENCOUNTER — PATIENT MESSAGE (OUTPATIENT)
Dept: CARDIOLOGY | Facility: CLINIC | Age: 58
End: 2023-06-26
Payer: COMMERCIAL

## 2023-06-28 ENCOUNTER — PATIENT MESSAGE (OUTPATIENT)
Dept: PAIN MEDICINE | Facility: CLINIC | Age: 58
End: 2023-06-28
Payer: COMMERCIAL

## 2023-07-05 ENCOUNTER — OFFICE VISIT (OUTPATIENT)
Dept: ORTHOPEDICS | Facility: CLINIC | Age: 58
End: 2023-07-05
Payer: COMMERCIAL

## 2023-07-05 VITALS — BODY MASS INDEX: 43.4 KG/M2 | HEIGHT: 69 IN | WEIGHT: 293 LBS

## 2023-07-05 DIAGNOSIS — G56.02 CARPAL TUNNEL SYNDROME ON LEFT: Primary | ICD-10-CM

## 2023-07-05 PROCEDURE — 99213 OFFICE O/P EST LOW 20 MIN: CPT | Mod: S$PBB,,, | Performed by: ORTHOPAEDIC SURGERY

## 2023-07-05 PROCEDURE — 99999 PR PBB SHADOW E&M-EST. PATIENT-LVL III: ICD-10-PCS | Mod: PBBFAC,,, | Performed by: ORTHOPAEDIC SURGERY

## 2023-07-05 PROCEDURE — 99213 PR OFFICE/OUTPT VISIT, EST, LEVL III, 20-29 MIN: ICD-10-PCS | Mod: S$PBB,,, | Performed by: ORTHOPAEDIC SURGERY

## 2023-07-05 PROCEDURE — 99999 PR PBB SHADOW E&M-EST. PATIENT-LVL III: CPT | Mod: PBBFAC,,, | Performed by: ORTHOPAEDIC SURGERY

## 2023-07-05 PROCEDURE — 99213 OFFICE O/P EST LOW 20 MIN: CPT | Mod: PBBFAC,PN | Performed by: ORTHOPAEDIC SURGERY

## 2023-07-05 NOTE — PATIENT INSTRUCTIONS
Surgery Instructions:     Your surgery is scheduled on 07/25/23 [facility] [address] [facility] 1st [facility], second entrance.    The pre-op department will be in contact with you prior to your procedure to review medications and instructions.       Nothing to eat or drink after midnight prior to day of surgery.    The surgery center will contact you the day prior to surgery to advise you of your arrival time for surgery.     Your post op appointment is scheduled on 08/07/23 @ 2:20pm.

## 2023-07-05 NOTE — H&P (VIEW-ONLY)
7/5/2023    Chief Complaint:  Chief Complaint   Patient presents with    Right Hand - Post-op Evaluation       HPI:  Mignon Hardy is a 57 y.o. female, who presents to clinic today she has a history of bilateral carpal tunnel syndrome.  She underwent a right carpal tunnel release and did well.  She is continuing to complain of numbness and tingling to her left hand.    PMHX:  Past Medical History:   Diagnosis Date    Chronic bilateral low back pain with left-sided sciatica 2/10/2021    Essential tremor 2/10/2021    Heart disease     Hypertension     Obesity        PSHX:  Past Surgical History:   Procedure Laterality Date    CARPAL TUNNEL RELEASE Right 5/9/2023    Procedure: Right carpal tunnel release;  Surgeon: Kash Sahni MD;  Location: [facility];  Service: Orthopedics;  Laterality: Right;    EPIDURAL STEROID INJECTION INTO CERVICAL SPINE N/A 6/7/2023    Procedure: Injection-steroid-epidural-cervical C7-T1;  Surgeon: Aileen Ocampo DO;  Location: [facility]IN [facility];  Service: Pain Management;  Laterality: N/A;    ESOPHAGEAL DILATION N/A 05/20/2022    Procedure: DILATION, ESOPHAGUS;  Surgeon: Jose Aguilera MD;  Location: [facility];  Service: Endoscopy;  Laterality: N/A;  [place]    ESOPHAGOGASTRODUODENOSCOPY N/A 05/20/2022    Procedure: EGD (ESOPHAGOGASTRODUODENOSCOPY);  Surgeon: Jose Aguilera MD;  Location: [facility];  Service: Endoscopy;  Laterality: N/A;    REMOVAL-MASS      of thyroid       FMHX:  Family History   Problem Relation Age of Onset    Heart disease Father     Tremor Father     Cancer Maternal Grandmother        SOCHX:  Social History     Tobacco Use    Smoking status: Never    Smokeless tobacco: Not on file   Substance Use Topics    Alcohol use: Never       ALLERGIES:  Patient has no known allergies.    CURRENT MEDICATIONS:  Current Outpatient Medications on File Prior to Visit   Medication Sig Dispense Refill    chlorthalidone (HYGROTEN) 25 MG Tab Take 1 tablet (25 mg  "total) by mouth once daily. 90 tablet 3    ergocalciferol (ERGOCALCIFEROL) 50,000 unit Cap Take 50,000 Units by mouth as needed.      hydroCHLOROthiazide (HYDRODIURIL) 25 MG tablet Take 1 tablet (25 mg total) by mouth once daily. 30 tablet 11    HYDROcodone-acetaminophen (NORCO) 5-325 mg per tablet Take 1 tablet by mouth every 6 (six) hours as needed for Pain. 6 tablet 0    ibuprofen (ADVIL,MOTRIN) 200 MG tablet Take 600 mg by mouth every 8 (eight) hours as needed.      ibuprofen (ADVIL,MOTRIN) 800 MG tablet Take 1 tablet (800 mg total) by mouth as needed for Pain. 30 tablet 0    omeprazole (PRILOSEC) 40 MG capsule omeprazole 40 mg capsule,delayed release   TAKE ONE CAPSULE BY MOUTH ONCE DAILY.      potassium chloride SA (K-DUR,KLOR-CON) 20 MEQ tablet potassium chloride ER 20 mEq tablet,extended release(part/cryst)   TAKE ONE TABLET BY MOUTH ONCE DAILY      pregabalin (LYRICA) 50 MG capsule TAKE 1 CAPSULE (50 MG TOTAL) BY MOUTH 3 (THREE) TIMES DAILY. 90 capsule 3    [DISCONTINUED] meloxicam (MOBIC) 15 MG tablet Take 1 tablet (15 mg total) by mouth once daily. (Patient not taking: Reported on 5/8/2023) 28 tablet 0     No current facility-administered medications on file prior to visit.       REVIEW OF SYSTEMS:  ROS    GENERAL PHYSICAL EXAM:   Ht 5' 9" (1.753 m)   Wt (!) 167.8 kg (370 lb)   BMI 54.64 kg/m²    GEN: well developed, well nourished, no acute distress   HENT: Normocephalic, atraumatic   EYES: No discharge, conjunctiva normal   NECK: Supple, non-tender   PULM: No wheezing, no respiratory distress   CV: RRR   ABD: Soft, non-tender    ORTHO EXAM:   Examination of the right hand and wrist reveals that the incision is well healed.  There is a small amount of scar tissue.  Palpation produces minimal tenderness over the incision.  She reports improved sensation in the median distribution with intact ulnar and radial sensation.  She has a 2+ radial pulse     Examination left hand and wrist reveals that there is " no edema.  There are no major skin changes.  Palpation produces no tenderness.  She has intact sensation throughout the median radial ulnar distribution but it is decreased in the median distribution.  She has positive Tinel's and positive Durkan's test.  Has 2+ radial pulse    RADIOLOGY:   EMG nerve conduction study has been reviewed.  It is consistent with severe bilateral carpal tunnel syndrome    ASSESSMENT:   Status post right carpal tunnel release, left carpal tunnel syndrome    PLAN:  1. I did review the risks and benefits of carpal tunnel release with the patient.  After discussion those risks and benefits informed consent has been obtained to proceed with left carpal tunnel release.    2.  Will proceed with a left carpal tunnel release under local anesthesia    3.  The patient will follow up with me 2 weeks postoperatively

## 2023-07-05 NOTE — PROGRESS NOTES
7/5/2023    Chief Complaint:  Chief Complaint   Patient presents with    Right Hand - Post-op Evaluation       HPI:  Mignon Hardy is a 57 y.o. female, who presents to clinic today she has a history of bilateral carpal tunnel syndrome.  She underwent a right carpal tunnel release and did well.  She is continuing to complain of numbness and tingling to her left hand.    PMHX:  Past Medical History:   Diagnosis Date    Chronic bilateral low back pain with left-sided sciatica 2/10/2021    Essential tremor 2/10/2021    Heart disease     Hypertension     Obesity        PSHX:  Past Surgical History:   Procedure Laterality Date    CARPAL TUNNEL RELEASE Right 5/9/2023    Procedure: Right carpal tunnel release;  Surgeon: Kash Sahni MD;  Location: [facility];  Service: Orthopedics;  Laterality: Right;    EPIDURAL STEROID INJECTION INTO CERVICAL SPINE N/A 6/7/2023    Procedure: Injection-steroid-epidural-cervical C7-T1;  Surgeon: Aileen Ocampo DO;  Location: [facility] PAIN MANAGEMENT;  Service: Pain Management;  Laterality: N/A;    ESOPHAGEAL DILATION N/A 05/20/2022    Procedure: DILATION, ESOPHAGUS;  Surgeon: Jose Aguilera MD;  Location: [facility];  Service: Endoscopy;  Laterality: N/A;  [place]    ESOPHAGOGASTRODUODENOSCOPY N/A 05/20/2022    Procedure: EGD (ESOPHAGOGASTRODUODENOSCOPY);  Surgeon: Jose Aguilera MD;  Location: [facility];  Service: Endoscopy;  Laterality: N/A;    REMOVAL-MASS      of thyroid       FMHX:  Family History   Problem Relation Age of Onset    Heart disease Father     Tremor Father     Cancer Maternal Grandmother        SOCHX:  Social History     Tobacco Use    Smoking status: Never    Smokeless tobacco: Not on file   Substance Use Topics    Alcohol use: Never       ALLERGIES:  Patient has no known allergies.    CURRENT MEDICATIONS:  Current Outpatient Medications on File Prior to Visit   Medication Sig Dispense Refill    chlorthalidone (HYGROTEN) 25 MG Tab Take 1 tablet (25 mg  "total) by mouth once daily. 90 tablet 3    ergocalciferol (ERGOCALCIFEROL) 50,000 unit Cap Take 50,000 Units by mouth as needed.      hydroCHLOROthiazide (HYDRODIURIL) 25 MG tablet Take 1 tablet (25 mg total) by mouth once daily. 30 tablet 11    HYDROcodone-acetaminophen (NORCO) 5-325 mg per tablet Take 1 tablet by mouth every 6 (six) hours as needed for Pain. 6 tablet 0    ibuprofen (ADVIL,MOTRIN) 200 MG tablet Take 600 mg by mouth every 8 (eight) hours as needed.      ibuprofen (ADVIL,MOTRIN) 800 MG tablet Take 1 tablet (800 mg total) by mouth as needed for Pain. 30 tablet 0    omeprazole (PRILOSEC) 40 MG capsule omeprazole 40 mg capsule,delayed release   TAKE ONE CAPSULE BY MOUTH ONCE DAILY.      potassium chloride SA (K-DUR,KLOR-CON) 20 MEQ tablet potassium chloride ER 20 mEq tablet,extended release(part/cryst)   TAKE ONE TABLET BY MOUTH ONCE DAILY      pregabalin (LYRICA) 50 MG capsule TAKE 1 CAPSULE (50 MG TOTAL) BY MOUTH 3 (THREE) TIMES DAILY. 90 capsule 3    [DISCONTINUED] meloxicam (MOBIC) 15 MG tablet Take 1 tablet (15 mg total) by mouth once daily. (Patient not taking: Reported on 5/8/2023) 28 tablet 0     No current facility-administered medications on file prior to visit.       REVIEW OF SYSTEMS:  ROS    GENERAL PHYSICAL EXAM:   Ht 5' 9" (1.753 m)   Wt (!) 167.8 kg (370 lb)   BMI 54.64 kg/m²    GEN: well developed, well nourished, no acute distress   HENT: Normocephalic, atraumatic   EYES: No discharge, conjunctiva normal   NECK: Supple, non-tender   PULM: No wheezing, no respiratory distress   CV: RRR   ABD: Soft, non-tender    ORTHO EXAM:   Examination of the right hand and wrist reveals that the incision is well healed.  There is a small amount of scar tissue.  Palpation produces minimal tenderness over the incision.  She reports improved sensation in the median distribution with intact ulnar and radial sensation.  She has a 2+ radial pulse     Examination left hand and wrist reveals that there is " no edema.  There are no major skin changes.  Palpation produces no tenderness.  She has intact sensation throughout the median radial ulnar distribution but it is decreased in the median distribution.  She has positive Tinel's and positive Durkan's test.  Has 2+ radial pulse    RADIOLOGY:   EMG nerve conduction study has been reviewed.  It is consistent with severe bilateral carpal tunnel syndrome    ASSESSMENT:   Status post right carpal tunnel release, left carpal tunnel syndrome    PLAN:  1. I did review the risks and benefits of carpal tunnel release with the patient.  After discussion those risks and benefits informed consent has been obtained to proceed with left carpal tunnel release.    2.  Will proceed with a left carpal tunnel release under local anesthesia    3.  The patient will follow up with me 2 weeks postoperatively

## 2023-07-10 ENCOUNTER — PATIENT MESSAGE (OUTPATIENT)
Dept: PAIN MEDICINE | Facility: CLINIC | Age: 58
End: 2023-07-10
Payer: MEDICAID

## 2023-07-10 ENCOUNTER — PATIENT MESSAGE (OUTPATIENT)
Dept: OBSTETRICS AND GYNECOLOGY | Facility: CLINIC | Age: 58
End: 2023-07-10
Payer: MEDICAID

## 2023-07-11 DIAGNOSIS — G56.02 CARPAL TUNNEL SYNDROME ON LEFT: Primary | ICD-10-CM

## 2023-07-17 ENCOUNTER — OFFICE VISIT (OUTPATIENT)
Dept: PAIN MEDICINE | Facility: CLINIC | Age: 58
End: 2023-07-17
Payer: MEDICAID

## 2023-07-17 VITALS
HEART RATE: 74 BPM | WEIGHT: 293 LBS | BODY MASS INDEX: 54.63 KG/M2 | SYSTOLIC BLOOD PRESSURE: 131 MMHG | DIASTOLIC BLOOD PRESSURE: 83 MMHG

## 2023-07-17 DIAGNOSIS — M50.30 DDD (DEGENERATIVE DISC DISEASE), CERVICAL: ICD-10-CM

## 2023-07-17 DIAGNOSIS — G89.4 CHRONIC PAIN SYNDROME: ICD-10-CM

## 2023-07-17 DIAGNOSIS — M54.2 CERVICALGIA: ICD-10-CM

## 2023-07-17 DIAGNOSIS — M47.812 CERVICAL SPONDYLOSIS: ICD-10-CM

## 2023-07-17 DIAGNOSIS — M54.12 CERVICAL RADICULOPATHY: Primary | ICD-10-CM

## 2023-07-17 PROCEDURE — 99213 OFFICE O/P EST LOW 20 MIN: CPT | Mod: PBBFAC,PO | Performed by: NURSE PRACTITIONER

## 2023-07-17 PROCEDURE — 3079F PR MOST RECENT DIASTOLIC BLOOD PRESSURE 80-89 MM HG: ICD-10-PCS | Mod: CPTII,,, | Performed by: NURSE PRACTITIONER

## 2023-07-17 PROCEDURE — 1159F PR MEDICATION LIST DOCUMENTED IN MEDICAL RECORD: ICD-10-PCS | Mod: CPTII,,, | Performed by: NURSE PRACTITIONER

## 2023-07-17 PROCEDURE — 3008F BODY MASS INDEX DOCD: CPT | Mod: CPTII,,, | Performed by: NURSE PRACTITIONER

## 2023-07-17 PROCEDURE — 3079F DIAST BP 80-89 MM HG: CPT | Mod: CPTII,,, | Performed by: NURSE PRACTITIONER

## 2023-07-17 PROCEDURE — 1160F PR REVIEW ALL MEDS BY PRESCRIBER/CLIN PHARMACIST DOCUMENTED: ICD-10-PCS | Mod: CPTII,,, | Performed by: NURSE PRACTITIONER

## 2023-07-17 PROCEDURE — 3008F PR BODY MASS INDEX (BMI) DOCUMENTED: ICD-10-PCS | Mod: CPTII,,, | Performed by: NURSE PRACTITIONER

## 2023-07-17 PROCEDURE — 3075F PR MOST RECENT SYSTOLIC BLOOD PRESS GE 130-139MM HG: ICD-10-PCS | Mod: CPTII,,, | Performed by: NURSE PRACTITIONER

## 2023-07-17 PROCEDURE — 1160F RVW MEDS BY RX/DR IN RCRD: CPT | Mod: CPTII,,, | Performed by: NURSE PRACTITIONER

## 2023-07-17 PROCEDURE — 99999 PR PBB SHADOW E&M-EST. PATIENT-LVL III: ICD-10-PCS | Mod: PBBFAC,,, | Performed by: NURSE PRACTITIONER

## 2023-07-17 PROCEDURE — 4010F ACE/ARB THERAPY RXD/TAKEN: CPT | Mod: CPTII,,, | Performed by: NURSE PRACTITIONER

## 2023-07-17 PROCEDURE — 3075F SYST BP GE 130 - 139MM HG: CPT | Mod: CPTII,,, | Performed by: NURSE PRACTITIONER

## 2023-07-17 PROCEDURE — 99214 PR OFFICE/OUTPT VISIT, EST, LEVL IV, 30-39 MIN: ICD-10-PCS | Mod: S$PBB,,, | Performed by: NURSE PRACTITIONER

## 2023-07-17 PROCEDURE — 1159F MED LIST DOCD IN RCRD: CPT | Mod: CPTII,,, | Performed by: NURSE PRACTITIONER

## 2023-07-17 PROCEDURE — 4010F PR ACE/ARB THEARPY RXD/TAKEN: ICD-10-PCS | Mod: CPTII,,, | Performed by: NURSE PRACTITIONER

## 2023-07-17 PROCEDURE — 99999 PR PBB SHADOW E&M-EST. PATIENT-LVL III: CPT | Mod: PBBFAC,,, | Performed by: NURSE PRACTITIONER

## 2023-07-17 PROCEDURE — 99214 OFFICE O/P EST MOD 30 MIN: CPT | Mod: S$PBB,,, | Performed by: NURSE PRACTITIONER

## 2023-07-17 NOTE — PROGRESS NOTES
[facility] Visit  Patient Evaluation    Referred by: No ref. provider found   Reason for referral: * No diagnoses found *     CC:   Chief Complaint   Patient presents with    Neck Pain     Left      Last 3 PDI Scores 4/18/2023 3/21/2023   Pain Disability Index (PDI) 46 40     Interval Updates:   7/17/2023-57 y/o female presents s/p a Cervical KAILEE targeting C7-T1 she is reporting 0% relief of her symptoms. She continues have neck pain that radiates into left shoulder and arm.   She denies any profound weakness, denies dropping things.       Interval history 04/18/2023:  Patient presents for follow up of her neck pain that radiates into the left arm.  She started Lyrica at last visit and is taking it 50 mg b.i.d. with approximately 50% relief of her pain.  She was evaluated by Physical therapy and has completed 1 session thus far.  She had an MRI of her cervical spine performed which was significant for degenerative changes throughout including facet arthropathy and foraminal narrowing most severe at C3/C4, C4/C5 and C6/C7.  She would like to proceed with cervical epidural steroid injection.      Subjective 03/21/2023:   Mignon Hardy is a 58 y.o. female who presents complaining of neck pain that radiates into the left shoulder and arm x 1 year.  She reports undergoing a cervical spine surgery in 1996 however she is unsure of the details of the surgery or what was done.  An EMG from 03/31/2021 showed bilateral chronic to subacute C6/7 radiculopathy without active denervation.  We do not have any imaging of her neck.  She denies any weakness in the upper extremities.      Location: neck    Onset: one year ago   Current Pain Score: 7/10  Daily Pain of Range: 7-8/10  Quality: Aching, Burning, Throbbing, Grabbing, Tight, Tingling, Deep, Sharp, and Electric  Radiation: left shoulder and back  Worsened by: sitting, standing, and daily activity   Improved by: medications, rest, and  sitting    Patient denies night fever/night sweats, urinary incontinence, bowel incontinence, significant weight loss, significant motor weakness, and loss of sensations.    Previous Interventions:  - 06/07/2023 Cervical Interlaminar Epidural Steroid Injection C7/T1    Previous Therapies:  PT/OT: Yes attended once   Chiropractor:   HEP:   Relevant Surgery: Pt reports undergoing cervical spine surgery in 1996 at the time - She is unsure what she had done.  Previous Medications:   - NSAIDS:  Meloxicam 15 mg  - Muscle Relaxants:    - TCAs:   - SNRIs:   - Topicals:   - Anticonvulsants:  Lyrica  - Opioids:  Tramadol  - Adjuvants:     Current Pain Medications:  Meloxicam 15 mg QD  Lyrica 50 mg b.i.d.    Review of Systems:  Review of Systems   Musculoskeletal:  Positive for neck pain.     GENERAL:  No weight loss, malaise or fevers. +Obesity  HEENT:   No recent changes in vision or hearing  NECK:  No difficulty with swallowing. No stridor.   RESPIRATORY:  Negative for cough, wheezing or shortness of breath, patient denies any recent URI.  CARDIOVASCULAR:  Negative for chest pain, leg swelling or palpitations.  GI:  Negative for abdominal discomfort, blood in stools or black stools or change in bowel habits.  MUSCULOSKELETAL:  See HPI.  SKIN:  Negative for lesions, rash, and itching.  PSYCH:  No mood disorder or recent psychosocial stressors.    HEMATOLOGY/LYMPHOLOGY:  Negative for prolonged bleeding, bruising easily or swollen nodes.  Patient is not currently taking any anti-coagulants  NEURO:   No history of headaches, syncope, paralysis, seizures or tremors.  All other reviewed and negative other than HPI.    History:  Current medications, allergies, medical history, surgical history,   family history, and social history were reviewed in the chart as marked.    Full Medication List:    Current Outpatient Medications:     chlorthalidone (HYGROTEN) 25 MG Tab, Take 1 tablet (25 mg total) by mouth once daily., Disp: 90  tablet, Rfl: 3    ergocalciferol (ERGOCALCIFEROL) 50,000 unit Cap, Take 50,000 Units by mouth as needed., Disp: , Rfl:     hydroCHLOROthiazide (HYDRODIURIL) 25 MG tablet, Take 1 tablet (25 mg total) by mouth once daily., Disp: 30 tablet, Rfl: 11    HYDROcodone-acetaminophen (NORCO) 5-325 mg per tablet, Take 1 tablet by mouth every 6 (six) hours as needed for Pain., Disp: 6 tablet, Rfl: 0    ibuprofen (ADVIL,MOTRIN) 200 MG tablet, Take 600 mg by mouth every 8 (eight) hours as needed., Disp: , Rfl:     ibuprofen (ADVIL,MOTRIN) 800 MG tablet, Take 1 tablet (800 mg total) by mouth as needed for Pain., Disp: 30 tablet, Rfl: 0    omeprazole (PRILOSEC) 40 MG capsule, omeprazole 40 mg capsule,delayed release  TAKE ONE CAPSULE BY MOUTH ONCE DAILY., Disp: , Rfl:     potassium chloride SA (K-DUR,KLOR-CON) 20 MEQ tablet, potassium chloride ER 20 mEq tablet,extended release(part/cryst)  TAKE ONE TABLET BY MOUTH ONCE DAILY, Disp: , Rfl:     pregabalin (LYRICA) 50 MG capsule, TAKE 1 CAPSULE (50 MG TOTAL) BY MOUTH 3 (THREE) TIMES DAILY., Disp: 90 capsule, Rfl: 3     Allergies:  Patient has no known allergies.     Medical History:   has a past medical history of Chronic bilateral low back pain with left-sided sciatica (2/10/2021), Essential tremor (2/10/2021), Heart disease, Hypertension, and Obesity.    Surgical History:   has a past surgical history that includes Esophagogastroduodenoscopy (N/A, 05/20/2022); Esophageal dilation (N/A, 05/20/2022); removal-mass; Carpal tunnel release (Right, 5/9/2023); and Epidural steroid injection into cervical spine (N/A, 6/7/2023).    Family History:  family history includes Cancer in her maternal grandmother; Heart disease in her father; Tremor in her father.    Social History:   reports that she has never smoked. She does not have any smokeless tobacco history on file. She reports that she does not drink alcohol and does not use drugs.    Physical Exam:  Vitals:    07/17/23 1318   BP: 131/83    Pulse: 74   Weight: (!) 167.8 kg (369 lb 14.9 oz)   PainSc:   9       GENERAL: Well appearing, in no acute distress, alert and oriented x3.  PSYCH:  Mood and affect appropriate.  SKIN: Skin color, texture, turgor normal, no rashes or lesions.  HEAD/FACE:  Normocephalic, atraumatic. Cranial nerves grossly intact.  NECK: Normal ROM. Supple.  There is no significant pain to palpation over the cervical paraspinal muscles.  No increased pain with facet loading.  Spurling test negative.  Mild decrease in neck flexion and extension due to pain.  CV: RRR with palpation of the radial artery.  PULM: No evidence of respiratory difficulty, symmetric chest rise.  GI:  Soft and non-distended.  MSK: Peripheral joint ROM is full and pain free without obvious instability or laxity in all four extremities with ambulation. No deformities, edema, or skin discoloration.  No atrophy or tone abnormalities are noted.   NEURO: Bilateral upper and lower extremity coordination and strength is symmetric.  No loss of sensation is noted in the bilateral upper extremities.  MENTAL STATUS: A x O x 3, good concentration, speech is fluent and goal directed  MOTOR: 5/5 in all muscle groups  GAIT: normal. Ambulates unassisted.    Imaging:  EXAMINATION:  MRI CERVICAL SPINE WITHOUT CONTRAST     CLINICAL HISTORY:  Neck pain, chronic;.  Cervicalgia     TECHNIQUE:  Multiplanar, multisequence MR images of the cervical spine were acquired without the administration of contrast.     COMPARISON:  None.     FINDINGS:  Somewhat limited exam secondary to patient body habitus and slight motion artifact.     Morphology: Solid osseous fusion at C5-C6.  Marrow signal is within normal limits and vertebral body heights are preserved.     Alignment: Grade 1 retrolisthesis of C6 on C7..     Cord: The cervical cord shows signal content throughout its length.     Craniocervical Junction: Cerebellar tonsils are normally positioned. The visualized portions of the posterior  fossa are unremarkable. The regional osseous anatomy is normal.        Disc levels:        C2-C3: Shallow broad-based disc osteophyte complex lateralizing to the left with uncovertebral spurring producing mild narrowing of the left central spinal canal and left foramen.  The right foramen is patent.     C3-C4: Shallow broad-based disc osteophyte complex in the setting of moderate disc height loss and mild ligamentum flavum thickening producing no more than mild narrowing of the spinal canal.  Facet arthrosis and shallow uncovertebral spurring most pronounced on the right contributing to moderate to severe bilateral foraminal narrowing, right greater than left.     C4-C5: Shallow broad-based disc osteophyte complex flattening the ventral thecal sac slightly contouring the ventral cord surface producing no more than mild narrowing of the spinal canal.  Uncovertebral spurring and right-sided facet arthrosis produce moderate right foraminal narrowing.  Mild left foraminal narrowing..     C5-C6: Solid osseous fusion and decompression of the spinal canal/foramina..     C6-C7: Severe degenerative disc height loss and shallow broad-based disc osteophyte complex with ligamentum flavum thickening producing mild to moderate narrowing of the spinal canal.  Uncovertebral spurring and facet arthrosis produces moderate to severe bilateral foraminal narrowing..     C7-T1: Mild degenerative disc desiccation and height loss.  No disc herniation.  Mild facet arthrosis.  The spinal canal and foramina are patent..     Soft tissues: Incidentally seen 1.8 cm left thyroid nodule.        Impression:     1. Solid osseous fusion and decompression of the spinal canal at C5-C6.  2. Degenerative changes including uncovertebral spurring and facet arthrosis producing multilevel foraminal narrowing most notable for moderate to severe bilateral C3-C4, moderate right C4-C5, and moderate to severe bilateral C6-C7 narrowing.  3. No high-grade narrowing  of the spinal canal.  4. Incidentally seen 1.8 cm left thyroid nodule for which routine thyroid ultrasound is recommended for further characterization.     Electronically signed by: Armond Chaves  Date:                                            03/30/2023      EMG W/ ULTRASOUND AND NERVE CONDUCTION TEST 2 Extremities  3/31/2021  Summary: Left upper extremity nerve conduction studies show absent median sensory response. There is prolonged latency and low amplitude of the median monitor response. Left upper extremity needle examination shows neurogenic changes in the C6/7 innervated muscles and the opponens pollicis muscle. No fibrillation potentials were seen.      Right upper extremity nerve conduction studies show prolonged latency, low amplitude, and slowed conduction velocity of the median sensory response. There is prolonged latency of the median monitor response. Right upper extremity needle examination shows neurogenic changes in the C6/7 innervated muscles. No fibrillation potentials were seen.      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        Labs:  BMP  Lab Results   Component Value Date     05/15/2023    K 4.1 05/15/2023    CL 99 05/15/2023    CO2 33 (H) 05/15/2023    BUN 16 05/15/2023    CREATININE 0.8 05/15/2023    CALCIUM 9.6 05/15/2023    ANIONGAP 10 05/15/2023    EGFRNORACEVR >60.0 05/15/2023     Lab Results   Component Value Date    ALT 10 01/03/2008    AST 12 01/03/2008    ALKPHOS 80 01/03/2008    BILITOT 0.5 01/03/2008     Lab Results   Component Value Date    WBC 7.35 01/03/2008    HGB 12.0 01/03/2008    HCT 38.0 01/03/2008    MCV 90.0 01/03/2008     (H) 01/03/2008       Assessment:  Problem List Items Addressed This Visit    None  Visit Diagnoses       Cervical radiculopathy    -  Primary     Cervicalgia        DDD (degenerative disc disease), cervical        Chronic pain syndrome        Cervical spondylosis                03/21/2023- Mignon Hardy is a 58 y.o. female who  has a past medical history of Chronic bilateral low back pain with left-sided sciatica (2/10/2021), Essential tremor (2/10/2021), Heart disease, Hypertension, and Obesity.  By history and examination this patient has chronicneck pain with left radiculopathy in the distribution of C6 and C7 at noted on EMG.  I will order an MRI of the cervical spine to confirm pathology.  We discussed the underlying diagnoses and multiple treatment options including non-opioid medications, interventional procedures, physical therapy, home exercise, and weight loss.  The risks and benefits of each treatment option were discussed and all questions were answered.        04/18/2023 - Patient presents for follow up of her neck pain with left-sided radiculopathy.  Recent MRI was significant for degenerative changes including uncovertebral spurring and facet arthrosis producing multilevel foraminal narrowing most notable for moderate to severe bilateral C3-C4, moderate right C4-C5, and moderate to severe bilateral C6-C7 narrowing.  She has started physical therapy and Lyrica which has provided her approximately 50% relief of her pain.  She is interested in proceeding with cervical epidural steroid injection for her neck pain.    7/17/2023- Mignon Hardy is a 58 y.o. female who  has a past medical history of Chronic bilateral low back pain with left-sided sciatica (2/10/2021), Essential tremor (2/10/2021), Heart disease, Hypertension, and Obesity.  By history and examination this patient has chronicneck pain with left radiculopathy in the distribution of C5, C6, and C7.  The underlying cause cause is facet arthritis, degenerative disc disease, foraminal stenosis, central canal stenosis, and deconditioning.  Pathology is confirmed by imaging.  We  discussed the underlying diagnoses and multiple treatment options including non-opioid medications, interventional procedures, physical therapy, home exercise, core muscle enhancement, and weight loss.  The risks and benefits of each treatment option were discussed and all questions were answered.  We discussed her recent Lyrica usage she stated to me that she has only been taking this medication as needed on every other day I educated the patient that Lyrica typically gets better results when taking consistently.  She did report mild sleepiness encouraged her to be more compliant with this medication as prescribed by Dr. Ocampo.  Considering she denies any relief following the cervical epidural I have advised the patient to consult Neurosurgery for further evaluation of her neck and left arm pain.  Patient verbalized understanding and agreed.  Lastly patient lives on the [address] [address] she continued interventional pain management she would like to see somebody closer to her home.  Discussed I will put a general consult to Neurosurgery.        Treatment Plan:   Procedures:  None at this time  PT/OT/HEP:  Encouraged to Continue with physical therapy.  I have stressed the importance of physical activity and a home exercise plan to help with pain and improve health.  Discussed weight loss as well.  Medications:    - restart Lyrica 50 mg t.i.d..  She is currently taking it b.i.d., will attempt to titrate up to t.i.d. as tolerated.   - discontinue meloxicam patient currently taking ibuprofen which seems to be more effective.   -  Reviewed and consistent with medication use as prescribed.  Imaging:  MRI of the cervical spine was reviewed and results were discussed with the patient using spine models.  Follow Up: RTC 3 months or sooner.  Consult NSGY for further evaluation of her neck pain.   Consider IPM on the [place]    NINO Soler  Interventional Pain Management    Disclaimer: This note was partly  generated using dictation software which may occasionally result in transcription errors.

## 2023-07-24 ENCOUNTER — PATIENT MESSAGE (OUTPATIENT)
Dept: SURGERY | Facility: HOSPITAL | Age: 58
End: 2023-07-24
Payer: COMMERCIAL

## 2023-07-26 ENCOUNTER — PATIENT MESSAGE (OUTPATIENT)
Dept: ORTHOPEDICS | Facility: CLINIC | Age: 58
End: 2023-07-26
Payer: COMMERCIAL

## 2023-07-26 ENCOUNTER — PATIENT MESSAGE (OUTPATIENT)
Dept: PAIN MEDICINE | Facility: CLINIC | Age: 58
End: 2023-07-26
Payer: COMMERCIAL

## 2023-07-26 ENCOUNTER — OFFICE VISIT (OUTPATIENT)
Dept: OBSTETRICS AND GYNECOLOGY | Facility: CLINIC | Age: 58
End: 2023-07-26
Payer: MEDICAID

## 2023-07-26 VITALS
HEIGHT: 69 IN | SYSTOLIC BLOOD PRESSURE: 151 MMHG | BODY MASS INDEX: 43.4 KG/M2 | WEIGHT: 293 LBS | HEART RATE: 82 BPM | DIASTOLIC BLOOD PRESSURE: 99 MMHG

## 2023-07-26 DIAGNOSIS — M54.12 CERVICAL RADICULOPATHY: Primary | ICD-10-CM

## 2023-07-26 DIAGNOSIS — M50.30 DDD (DEGENERATIVE DISC DISEASE), CERVICAL: ICD-10-CM

## 2023-07-26 DIAGNOSIS — M47.812 CERVICAL SPONDYLOSIS: ICD-10-CM

## 2023-07-26 DIAGNOSIS — Z12.31 SCREENING MAMMOGRAM FOR BREAST CANCER: ICD-10-CM

## 2023-07-26 DIAGNOSIS — N95.0 POST-MENOPAUSAL BLEEDING: Primary | ICD-10-CM

## 2023-07-26 PROCEDURE — 99214 PR OFFICE/OUTPT VISIT, EST, LEVL IV, 30-39 MIN: ICD-10-PCS | Mod: S$PBB,,, | Performed by: STUDENT IN AN ORGANIZED HEALTH CARE EDUCATION/TRAINING PROGRAM

## 2023-07-26 PROCEDURE — 88175 CYTOPATH C/V AUTO FLUID REDO: CPT | Performed by: STUDENT IN AN ORGANIZED HEALTH CARE EDUCATION/TRAINING PROGRAM

## 2023-07-26 PROCEDURE — 3077F PR MOST RECENT SYSTOLIC BLOOD PRESSURE >= 140 MM HG: ICD-10-PCS | Mod: CPTII,,, | Performed by: STUDENT IN AN ORGANIZED HEALTH CARE EDUCATION/TRAINING PROGRAM

## 2023-07-26 PROCEDURE — 4010F ACE/ARB THERAPY RXD/TAKEN: CPT | Mod: CPTII,,, | Performed by: STUDENT IN AN ORGANIZED HEALTH CARE EDUCATION/TRAINING PROGRAM

## 2023-07-26 PROCEDURE — 87624 HPV HI-RISK TYP POOLED RSLT: CPT | Performed by: STUDENT IN AN ORGANIZED HEALTH CARE EDUCATION/TRAINING PROGRAM

## 2023-07-26 PROCEDURE — 99214 OFFICE O/P EST MOD 30 MIN: CPT | Mod: S$PBB,,, | Performed by: STUDENT IN AN ORGANIZED HEALTH CARE EDUCATION/TRAINING PROGRAM

## 2023-07-26 PROCEDURE — 99214 OFFICE O/P EST MOD 30 MIN: CPT | Mod: PBBFAC,PN | Performed by: STUDENT IN AN ORGANIZED HEALTH CARE EDUCATION/TRAINING PROGRAM

## 2023-07-26 PROCEDURE — 99999 PR PBB SHADOW E&M-EST. PATIENT-LVL IV: ICD-10-PCS | Mod: PBBFAC,,, | Performed by: STUDENT IN AN ORGANIZED HEALTH CARE EDUCATION/TRAINING PROGRAM

## 2023-07-26 PROCEDURE — 3008F BODY MASS INDEX DOCD: CPT | Mod: CPTII,,, | Performed by: STUDENT IN AN ORGANIZED HEALTH CARE EDUCATION/TRAINING PROGRAM

## 2023-07-26 PROCEDURE — 3008F PR BODY MASS INDEX (BMI) DOCUMENTED: ICD-10-PCS | Mod: CPTII,,, | Performed by: STUDENT IN AN ORGANIZED HEALTH CARE EDUCATION/TRAINING PROGRAM

## 2023-07-26 PROCEDURE — 1159F PR MEDICATION LIST DOCUMENTED IN MEDICAL RECORD: ICD-10-PCS | Mod: CPTII,,, | Performed by: STUDENT IN AN ORGANIZED HEALTH CARE EDUCATION/TRAINING PROGRAM

## 2023-07-26 PROCEDURE — 4010F PR ACE/ARB THEARPY RXD/TAKEN: ICD-10-PCS | Mod: CPTII,,, | Performed by: STUDENT IN AN ORGANIZED HEALTH CARE EDUCATION/TRAINING PROGRAM

## 2023-07-26 PROCEDURE — 3080F DIAST BP >= 90 MM HG: CPT | Mod: CPTII,,, | Performed by: STUDENT IN AN ORGANIZED HEALTH CARE EDUCATION/TRAINING PROGRAM

## 2023-07-26 PROCEDURE — 3080F PR MOST RECENT DIASTOLIC BLOOD PRESSURE >= 90 MM HG: ICD-10-PCS | Mod: CPTII,,, | Performed by: STUDENT IN AN ORGANIZED HEALTH CARE EDUCATION/TRAINING PROGRAM

## 2023-07-26 PROCEDURE — 1159F MED LIST DOCD IN RCRD: CPT | Mod: CPTII,,, | Performed by: STUDENT IN AN ORGANIZED HEALTH CARE EDUCATION/TRAINING PROGRAM

## 2023-07-26 PROCEDURE — 99999 PR PBB SHADOW E&M-EST. PATIENT-LVL IV: CPT | Mod: PBBFAC,,, | Performed by: STUDENT IN AN ORGANIZED HEALTH CARE EDUCATION/TRAINING PROGRAM

## 2023-07-26 PROCEDURE — 3077F SYST BP >= 140 MM HG: CPT | Mod: CPTII,,, | Performed by: STUDENT IN AN ORGANIZED HEALTH CARE EDUCATION/TRAINING PROGRAM

## 2023-07-26 NOTE — PROGRESS NOTES
"History & Physical  Gynecology      SUBJECTIVE:     Chief Complaint: Postmenopausal bleeding    History of Present Illness:    Here today for PMB. During covid went 1 whole year without her period. Had episode of bleeding January and then again a few months ago like a "full period". Unsure if she is in menopause or what. Not sexually active.  Hx of endometrial ablation over 10 years ago. No hx of abnormal pap smear.       Review of patient's allergies indicates:  No Known Allergies    Past Medical History:   Diagnosis Date    Chronic bilateral low back pain with left-sided sciatica 2/10/2021    Essential tremor 2/10/2021    Heart disease     Hypertension     Obesity      Past Surgical History:   Procedure Laterality Date    CARPAL TUNNEL RELEASE Right 2023    Procedure: Right carpal tunnel release;  Surgeon: Kash Sahni MD;  Location: [facility];  Service: Orthopedics;  Laterality: Right;    EPIDURAL STEROID INJECTION INTO CERVICAL SPINE N/A 2023    Procedure: Injection-steroid-epidural-cervical C7-T1;  Surgeon: Aileen Ocampo DO;  Location: [facility];  Service: Pain Management;  Laterality: N/A;    ESOPHAGEAL DILATION N/A 2022    Procedure: DILATION, ESOPHAGUS;  Surgeon: Jose Aguilera MD;  Location: [facility];  Service: Endoscopy;  Laterality: N/A;  [place]    ESOPHAGOGASTRODUODENOSCOPY N/A 2022    Procedure: EGD (ESOPHAGOGASTRODUODENOSCOPY);  Surgeon: Jose Aguilera MD;  Location: [facility];  Service: Endoscopy;  Laterality: N/A;    REMOVAL-MASS      of thyroid    TUBAL LIGATION       OB History          2    Para   2    Term   2            AB        Living   2         SAB        IAB        Ectopic        Multiple        Live Births   2               Family History   Problem Relation Age of Onset    Cancer Maternal Grandmother     Heart disease Father     Tremor Father     Ovarian cancer Maternal Aunt     Colon cancer Neg Hx      Social History "     Tobacco Use    Smoking status: Never   Substance Use Topics    Alcohol use: Never    Drug use: Never       Current Outpatient Medications   Medication Sig    chlorthalidone (HYGROTEN) 25 MG Tab Take 1 tablet (25 mg total) by mouth once daily.    ergocalciferol (ERGOCALCIFEROL) 50,000 unit Cap Take 50,000 Units by mouth as needed.    hydroCHLOROthiazide (HYDRODIURIL) 25 MG tablet Take 1 tablet (25 mg total) by mouth once daily.    HYDROcodone-acetaminophen (NORCO) 5-325 mg per tablet Take 1 tablet by mouth every 6 (six) hours as needed for Pain.    ibuprofen (ADVIL,MOTRIN) 200 MG tablet Take 600 mg by mouth every 8 (eight) hours as needed.    ibuprofen (ADVIL,MOTRIN) 800 MG tablet Take 1 tablet (800 mg total) by mouth as needed for Pain.    omeprazole (PRILOSEC) 40 MG capsule omeprazole 40 mg capsule,delayed release   TAKE ONE CAPSULE BY MOUTH ONCE DAILY.    potassium chloride SA (K-DUR,KLOR-CON) 20 MEQ tablet potassium chloride ER 20 mEq tablet,extended release(part/cryst)   TAKE ONE TABLET BY MOUTH ONCE DAILY    pregabalin (LYRICA) 50 MG capsule TAKE 1 CAPSULE (50 MG TOTAL) BY MOUTH 3 (THREE) TIMES DAILY.     No current facility-administered medications for this visit.         Review of Systems:  Review of Systems   Constitutional:  Negative for chills, fatigue and fever.   HENT:  Negative for congestion.    Eyes:  Negative for visual disturbance.   Respiratory:  Negative for cough and shortness of breath.    Cardiovascular:  Negative for chest pain and palpitations.   Gastrointestinal:  Negative for abdominal distention, abdominal pain, constipation, diarrhea, nausea and vomiting.   Genitourinary:  Negative for difficulty urinating, dysuria, hematuria, vaginal bleeding and vaginal discharge.   Skin:  Negative for rash.   Neurological:  Negative for dizziness, seizures, light-headedness and headaches.   Hematological:  Does not bruise/bleed easily.   Psychiatric/Behavioral:  Negative for dysphoric mood. The  patient is not nervous/anxious.       OBJECTIVE:     Physical Exam:  Physical Exam  Vitals reviewed.   Constitutional:       General: She is not in acute distress.     Appearance: Normal appearance. She is well-developed.   HENT:      Head: Normocephalic and atraumatic.   Cardiovascular:      Rate and Rhythm: Normal rate and regular rhythm.   Pulmonary:      Effort: Pulmonary effort is normal.   Chest:   Breasts:     Right: No inverted nipple, mass, nipple discharge, skin change or tenderness.      Left: No inverted nipple, mass, nipple discharge, skin change or tenderness.   Abdominal:      General: There is no distension.      Palpations: Abdomen is soft.      Tenderness: There is no abdominal tenderness.   Genitourinary:     Vagina: Normal.      Comments: Normal external female genitalia, normal hair distribution. Vaginal mucosa pink, moist, well rugated, scant white physiologic discharge. No blood in vault. Cervix pink, non-friable, without lesion. No CMT. Uterus non tender, mobile, not enlarged. Adnexa without fullness or tenderness.    Cervix high, would not be a good in office EMB  Skin:     General: Skin is warm.   Neurological:      Mental Status: She is alert and oriented to person, place, and time.   Psychiatric:         Behavior: Behavior normal.         Thought Content: Thought content normal.         Judgment: Judgment normal.         ASSESSMENT:       ICD-10-CM ICD-9-CM    1. Post-menopausal bleeding  N95.0 627.1 HPV High Risk Genotypes, PCR      Liquid-Based Pap Smear, Screening      US Pelvis Comp with Transvag NON-OB (xpd      2. Screening mammogram for breast cancer  Z12.31 V76.12 Mammo Digital Screening Bilat          Orders Placed This Encounter   Procedures    HPV High Risk Genotypes, PCR     Order Specific Question:   Source     Answer:   Cervix     Order Specific Question:   Release to patient     Answer:   Immediate    US Pelvis Comp with Transvag NON-OB (xpd     Standing Status:   Future      Standing Expiration Date:   7/26/2024     Order Specific Question:   May the Radiologist modify the order per protocol to meet the clinical needs of the patient?     Answer:   Yes     Order Specific Question:   Release to patient     Answer:   Immediate    Mammo Digital Screening Bilat     Standing Status:   Future     Standing Expiration Date:   9/24/2024           Plan:   PMB:  - Discussed most common cause of PMB is uterine atrophy. Discussed differential dx including atrophy vs polyp, vs EIN or cancer. Discussed role of TVUS vs EMBx. EMS of 4 mm or less on TVUS has >99% NPV for endometrial cancer. Ongoing bleeding still requires tissue sample even if EMS is normal.  EMBx is reasonable first line choice or can be performed if bleeding persists. 10% of women with PMB have insufficient diagnosis and require hysteroscopy.   - risk of EIN given BMI  - high chance of no good sample in office given hx of ablation  - pap today  - TVUS ordered today, pending pap discussed in office embx vs hscope D&C. Leaning towards hscope DC given ablation hx and difficult pelvic exam in office.   - questions answered about D&C    Screening MMG ordered as not up to date    Noa Farias M.D.  Obstetrics and Gynecology

## 2023-07-27 ENCOUNTER — TELEPHONE (OUTPATIENT)
Dept: NEUROSURGERY | Facility: CLINIC | Age: 58
End: 2023-07-27
Payer: MEDICAID

## 2023-07-27 ENCOUNTER — PATIENT MESSAGE (OUTPATIENT)
Dept: ORTHOPEDICS | Facility: CLINIC | Age: 58
End: 2023-07-27
Payer: MEDICAID

## 2023-07-27 ENCOUNTER — TELEPHONE (OUTPATIENT)
Dept: FAMILY MEDICINE | Facility: CLINIC | Age: 58
End: 2023-07-27
Payer: MEDICAID

## 2023-07-27 ENCOUNTER — PATIENT MESSAGE (OUTPATIENT)
Dept: FAMILY MEDICINE | Facility: CLINIC | Age: 58
End: 2023-07-27
Payer: MEDICAID

## 2023-07-27 NOTE — TELEPHONE ENCOUNTER
[facility] assisted call for Pt. Requesting an appointment within one week. I researched schedule and gave the next available appointment. The nurse replied, that is not going to work. I explained that I would speak with the nurse as I had no override authority for appointment but would put Pt. On the waiting list. The nurse said that if this was all I could do she would be speaking to the provider. nm

## 2023-07-27 NOTE — TELEPHONE ENCOUNTER
----- Message from Bettie Holley sent at 7/27/2023 10:25 AM CDT -----  Contact: Self  Type:  Sooner Appointment Request    Caller is requesting a sooner appointment.  Caller declined first available appointment listed below.  Caller will not accept being placed on the waitlist and is requesting a message be sent to doctor.    Name of Caller:  Patient  When is the first available appointment?  N/A  Symptoms:  Est Care with pcp  Best Call Back Number:  [phone]  Additional Information:  Pt is needing a new pcp and it won't let me schedule anything, can we please call pt back to advise. Thank You.

## 2023-07-31 ENCOUNTER — HOSPITAL ENCOUNTER (OUTPATIENT)
Dept: RADIOLOGY | Facility: HOSPITAL | Age: 58
Discharge: HOME | End: 2023-07-31
Attending: STUDENT IN AN ORGANIZED HEALTH CARE EDUCATION/TRAINING PROGRAM
Payer: MEDICAID

## 2023-07-31 DIAGNOSIS — Z12.31 SCREENING MAMMOGRAM FOR BREAST CANCER: ICD-10-CM

## 2023-07-31 DIAGNOSIS — N95.0 POST-MENOPAUSAL BLEEDING: ICD-10-CM

## 2023-07-31 PROCEDURE — 77063 BREAST TOMOSYNTHESIS BI: CPT | Mod: 26,,, | Performed by: RADIOLOGY

## 2023-07-31 PROCEDURE — 76856 US EXAM PELVIC COMPLETE: CPT | Mod: 26,,, | Performed by: RADIOLOGY

## 2023-07-31 PROCEDURE — 77067 SCR MAMMO BI INCL CAD: CPT | Mod: 26,,, | Performed by: RADIOLOGY

## 2023-07-31 PROCEDURE — 77067 MAMMO DIGITAL SCREENING BILAT WITH TOMO: ICD-10-PCS | Mod: 26,,, | Performed by: RADIOLOGY

## 2023-07-31 PROCEDURE — 76830 US PELVIS COMP WITH TRANSVAG NON-OB (XPD): ICD-10-PCS | Mod: 26,,, | Performed by: RADIOLOGY

## 2023-07-31 PROCEDURE — 76830 TRANSVAGINAL US NON-OB: CPT | Mod: 26,,, | Performed by: RADIOLOGY

## 2023-07-31 PROCEDURE — 76856 US EXAM PELVIC COMPLETE: CPT | Mod: TC,PN

## 2023-07-31 PROCEDURE — 77063 MAMMO DIGITAL SCREENING BILAT WITH TOMO: ICD-10-PCS | Mod: 26,,, | Performed by: RADIOLOGY

## 2023-07-31 PROCEDURE — 77067 SCR MAMMO BI INCL CAD: CPT | Mod: TC,PN

## 2023-07-31 PROCEDURE — 76856 US PELVIS COMP WITH TRANSVAG NON-OB (XPD): ICD-10-PCS | Mod: 26,,, | Performed by: RADIOLOGY

## 2023-07-31 RX ORDER — PREGABALIN 50 MG/1
50 CAPSULE ORAL 3 TIMES DAILY
Qty: 90 CAPSULE | Refills: 3 | Status: SHIPPED | OUTPATIENT
Start: 2023-07-31 | End: 2024-03-27

## 2023-08-03 ENCOUNTER — HOSPITAL ENCOUNTER (OUTPATIENT)
Facility: HOSPITAL | Age: 58
Discharge: HOME | End: 2023-08-03
Attending: ORTHOPAEDIC SURGERY | Admitting: ORTHOPAEDIC SURGERY
Payer: MEDICAID

## 2023-08-03 VITALS
BODY MASS INDEX: 43.4 KG/M2 | DIASTOLIC BLOOD PRESSURE: 80 MMHG | WEIGHT: 293 LBS | OXYGEN SATURATION: 99 % | SYSTOLIC BLOOD PRESSURE: 145 MMHG | RESPIRATION RATE: 18 BRPM | TEMPERATURE: 97 F | HEART RATE: 76 BPM | HEIGHT: 69 IN

## 2023-08-03 DIAGNOSIS — G56.02 CARPAL TUNNEL SYNDROME ON LEFT: ICD-10-CM

## 2023-08-03 PROCEDURE — 71000015 HC POSTOP RECOV 1ST HR: Mod: PO | Performed by: ORTHOPAEDIC SURGERY

## 2023-08-03 PROCEDURE — 64721 PR REVISE MEDIAN N/CARPAL TUNNEL SURG: ICD-10-PCS | Mod: 79,LT,, | Performed by: ORTHOPAEDIC SURGERY

## 2023-08-03 PROCEDURE — 63600175 PHARM REV CODE 636 W HCPCS: Mod: PO | Performed by: PHYSICIAN ASSISTANT

## 2023-08-03 PROCEDURE — 36000707: Mod: PO | Performed by: ORTHOPAEDIC SURGERY

## 2023-08-03 PROCEDURE — 64721 CARPAL TUNNEL SURGERY: CPT | Mod: 79,LT,, | Performed by: ORTHOPAEDIC SURGERY

## 2023-08-03 PROCEDURE — 25000003 PHARM REV CODE 250: Mod: PO | Performed by: ORTHOPAEDIC SURGERY

## 2023-08-03 PROCEDURE — 36000706: Mod: PO | Performed by: ORTHOPAEDIC SURGERY

## 2023-08-03 PROCEDURE — 63600175 PHARM REV CODE 636 W HCPCS: Mod: PO | Performed by: ORTHOPAEDIC SURGERY

## 2023-08-03 RX ORDER — LIDOCAINE HYDROCHLORIDE 10 MG/ML
INJECTION, SOLUTION EPIDURAL; INFILTRATION; INTRACAUDAL; PERINEURAL
Status: DISCONTINUED | OUTPATIENT
Start: 2023-08-03 | End: 2023-08-03 | Stop reason: HOSPADM

## 2023-08-03 RX ORDER — BUPIVACAINE HYDROCHLORIDE 5 MG/ML
INJECTION, SOLUTION EPIDURAL; INTRACAUDAL
Status: DISCONTINUED | OUTPATIENT
Start: 2023-08-03 | End: 2023-08-03 | Stop reason: HOSPADM

## 2023-08-03 RX ORDER — SODIUM CHLORIDE, SODIUM LACTATE, POTASSIUM CHLORIDE, CALCIUM CHLORIDE 600; 310; 30; 20 MG/100ML; MG/100ML; MG/100ML; MG/100ML
INJECTION, SOLUTION INTRAVENOUS CONTINUOUS
Status: DISCONTINUED | OUTPATIENT
Start: 2023-08-03 | End: 2023-08-03 | Stop reason: HOSPADM

## 2023-08-03 RX ORDER — HYDROCODONE BITARTRATE AND ACETAMINOPHEN 5; 325 MG/1; MG/1
1 TABLET ORAL EVERY 6 HOURS PRN
Qty: 6 TABLET | Refills: 0 | Status: SHIPPED | OUTPATIENT
Start: 2023-08-03 | End: 2023-10-06 | Stop reason: CLARIF

## 2023-08-03 RX ADMIN — SODIUM CHLORIDE, POTASSIUM CHLORIDE, SODIUM LACTATE AND CALCIUM CHLORIDE: 600; 310; 30; 20 INJECTION, SOLUTION INTRAVENOUS at 08:08

## 2023-08-03 NOTE — PATIENT INSTRUCTIONS
Procedure: left carpal tunnel release    1. Please keep the dressing clean, dry, and in place. Do not take it off and do not get it wet.    2. Please keep the left arm and hand elevated for the 1st 24-48 hours to prevent swelling    3. Flexion and extension of the exposed fingers is encouraged, but do not attempt to push off or lift more than 1-2 pounds with left arm or hand    4. Please limit weightbearing to the left hand to 1-2 pounds. Light activity such as brushing your teeth, using a fork, or lifting a small drink is allowed starting today.    5. Pain medication has been prescribed. Please take them as necessary    6. If there are any questions or concerns please call Dr. Sahni's office at [phone]    7.  Follow up with Dr. Sahni in 2 weeks

## 2023-08-03 NOTE — OP NOTE
Mignon Hardy  1965    DATE OF SURGERY: 8/3/2023     PRE-OPERATIVE DIAGNOSIS: left Carpal Tunnel Syndrome    POST-OPERATIVE DIAGNOSIS: left Carpal Tunnel Syndrome     ANESTHESIA TYPE: Local    BLOOD LOSS:  Less than 10 cc    TOURNIQUET TIME:  Approximately 10 minutes    SURGEON: Dr. Sahni    ASSISTANT:  Luis Armando Damian    PROCEDURE: left Carpal Tunnel Release    IMPLANTS: None    SPECIMENS: None    INDICATION:     Ms. Hardy presented to my clinic with a history of left carpal tunnel symptoms. Conservative treatments were initially tried. The patient did have relief with attempts at conservative treatment however has had a return of symptoms. An EMG and nerve conduction study has been performed. That study has shown compression of the median nerve at the wrist consistent with carpal tunnel syndrome. A discussion of the risks and benefits of carpal tunnel release has been performed, and informed consent for the procedure has been obtained.    PROCEDURE IN DETAIL:     Ms. Hardy was transported to the operating room and was placed supine on the operating room table. All appropriate points were padded. The left hand and arm was prepped and draped in the normal sterile fashion. Time out was called. The correct patient, correct operative site, correct procedure, antibiotic administration which consisted of 3 g of Ancef, and allergies to medications which are to Patient has no known allergies.  were reviewed. Time in was then called.     Attention was turned to left hand where a 3 cm incision was made in the palm of the hand. This was carried through the skin and subcutaneous tissues were dissected bluntly. The superficial palmar fascia was identified and was split in line with its fibers. The transverse carpal ligament was then identified and was incised for a distance of approximately 1 cm sharply.The median nerve was visualized and a carpal tunnel sled was placed below the transverse carpal ligament but  above the median nerve. Blunt dissection proximally over the transverse carpal ligament was performed and elevator was placed just above the transverse carpal ligament proximally. The ligament was identified. There were noted to be no penetrating nerve branches and at that point the carpal tunnel knife was used to incise the proximal transverse carpal ligament. Attention was then turned to the distal portion of the transverse carpal ligament. The carpal tunnel sled was again placed underneath the transverse carpal ligament but above the median nerve distally. Blunt dissection above the transverse carpal ligament distally was performed and an elevator was placed. The distal portion of the transverse carpal ligament was visualized and there were noted to be no penetrating branches of the nerve. At that point, tenotomy scissors were used to transect the distal portion of the transverse carpal ligament under direct visualization. The median nerve was then visualized. There were noted to be no specific lesions of the nerve and all of its branches were noted to be intact. The wound was then irrigated copiously. The tourniquet was let down and meticulous hemostasis was obtained with bipolar cautery. The wound was closed with 4-0 nylon suture superficially. The wound was then dressed with Xeroform, 4 x 4's, cast padding and a 3 inch Ace wrap was placed.      The patient was stable in the operating room and was transported to the recovery room in stable condition. All lap, needle, sponge, and equipment counts were correct at the end of the case.    POST-OPERATIVE PLAN:     The patient will keep a soft dressing in place for two weeks at which time she will followup with me. The dressing will be taken down, and the sutures will be removed at that time. She is not to get the dressing wet or to take it off. She will have a 2 pound weight limit of the left upper extremity for a total of 4 weeks.

## 2023-08-03 NOTE — DISCHARGE SUMMARY
[facility] - Surgery  Discharge Note  Short Stay    Procedure(s) (LRB):  Left carpal tunnel release (Left)      OUTCOME: Patient tolerated treatment/procedure well without complication and is now ready for discharge.    DISPOSITION: Home or Self Care    FINAL DIAGNOSIS:  Carpal tunnel syndrome on left    FOLLOWUP: In clinic    DISCHARGE INSTRUCTIONS:    Discharge Procedure Orders   Diet general     Activity as tolerated     Keep surgical extremity elevated     Lifting restrictions   Order Comments: Please limit lifting with the left arm and hand to 2-3 lb     Leave dressing on - Keep it clean, dry, and intact until clinic visit     Call MD for:  temperature >100.4     Call MD for:  persistent nausea and vomiting     Call MD for:  severe uncontrolled pain     Call MD for:  difficulty breathing, headache or visual disturbances     Call MD for:  redness, tenderness, or signs of infection (pain, swelling, redness, odor or green/yellow discharge around incision site)     Call MD for:  hives     Call MD for:  persistent dizziness or light-headedness     Call MD for:  extreme fatigue        TIME SPENT ON DISCHARGE: 15 minutes

## 2023-08-07 DIAGNOSIS — N95.0 POST-MENOPAUSAL BLEEDING: Primary | ICD-10-CM

## 2023-08-09 ENCOUNTER — OFFICE VISIT (OUTPATIENT)
Dept: ORTHOPEDICS | Facility: CLINIC | Age: 58
End: 2023-08-09
Payer: MEDICAID

## 2023-08-09 VITALS — WEIGHT: 293 LBS | RESPIRATION RATE: 18 BRPM | HEIGHT: 69 IN | BODY MASS INDEX: 43.4 KG/M2

## 2023-08-09 DIAGNOSIS — M17.10 ARTHRITIS OF KNEE: ICD-10-CM

## 2023-08-09 DIAGNOSIS — M25.561 PAIN IN BOTH KNEES, UNSPECIFIED CHRONICITY: Primary | ICD-10-CM

## 2023-08-09 DIAGNOSIS — M25.562 PAIN IN BOTH KNEES, UNSPECIFIED CHRONICITY: Primary | ICD-10-CM

## 2023-08-09 PROCEDURE — 99999 PR PBB SHADOW E&M-EST. PATIENT-LVL III: ICD-10-PCS | Mod: PBBFAC,,, | Performed by: ORTHOPAEDIC SURGERY

## 2023-08-09 PROCEDURE — 20610 DRAIN/INJ JOINT/BURSA W/O US: CPT | Mod: PBBFAC,50,PN | Performed by: ORTHOPAEDIC SURGERY

## 2023-08-09 PROCEDURE — 99499 UNLISTED E&M SERVICE: CPT | Mod: S$PBB,,, | Performed by: ORTHOPAEDIC SURGERY

## 2023-08-09 PROCEDURE — 99999 PR PBB SHADOW E&M-EST. PATIENT-LVL III: CPT | Mod: PBBFAC,,, | Performed by: ORTHOPAEDIC SURGERY

## 2023-08-09 PROCEDURE — 99213 OFFICE O/P EST LOW 20 MIN: CPT | Mod: PBBFAC,PN | Performed by: ORTHOPAEDIC SURGERY

## 2023-08-09 PROCEDURE — 99999PBSHW PR PBB SHADOW TECHNICAL ONLY FILED TO HB: ICD-10-PCS | Mod: PBBFAC,,,

## 2023-08-09 PROCEDURE — 20610 DRAIN/INJ JOINT/BURSA W/O US: CPT | Mod: 50,PBBFAC,PN | Performed by: ORTHOPAEDIC SURGERY

## 2023-08-09 PROCEDURE — 99499 NO LOS: ICD-10-PCS | Mod: S$PBB,,, | Performed by: ORTHOPAEDIC SURGERY

## 2023-08-09 PROCEDURE — 20610 LARGE JOINT ASPIRATION/INJECTION: BILATERAL KNEE: ICD-10-PCS | Mod: 50,S$PBB,, | Performed by: ORTHOPAEDIC SURGERY

## 2023-08-09 PROCEDURE — 99999PBSHW PR PBB SHADOW TECHNICAL ONLY FILED TO HB: Mod: PBBFAC,,,

## 2023-08-09 RX ORDER — TRIAMCINOLONE ACETONIDE 40 MG/ML
40 INJECTION, SUSPENSION INTRA-ARTICULAR; INTRAMUSCULAR
Status: DISCONTINUED | OUTPATIENT
Start: 2023-08-09 | End: 2023-08-09 | Stop reason: HOSPADM

## 2023-08-09 RX ADMIN — TRIAMCINOLONE ACETONIDE 40 MG: 40 INJECTION, SUSPENSION INTRA-ARTICULAR; INTRAMUSCULAR at 03:08

## 2023-08-09 NOTE — PROCEDURES
Large Joint Aspiration/Injection: bilateral knee    Date/Time: 8/9/2023 3:15 PM    Performed by: Brian Austin MD  Authorized by: Brian Austin MD    Consent Done?:  Yes (Verbal)  Indications:  Pain  Site marked: the procedure site was marked    Timeout: prior to procedure the correct patient, procedure, and site was verified    Prep: patient was prepped and draped in usual sterile fashion      Local anesthesia used?: Yes    Anesthesia:  Local infiltration  Local anesthetic: Ropivicaine.  Anesthetic total (ml):  3      Details:  Needle Size:  22 G  Ultrasonic Guidance for needle placement?: No    Approach:  Anterolateral  Location:  Knee  Laterality:  Bilateral  Site:  Bilateral knee  Medications (Right):  40 mg triamcinolone acetonide 40 mg/mL  Medications (Left):  40 mg triamcinolone acetonide 40 mg/mL  Patient tolerance:  Patient tolerated the procedure well with no immediate complications

## 2023-08-09 NOTE — PROGRESS NOTES
Past Medical History:   Diagnosis Date    Chronic bilateral low back pain with left-sided sciatica 2/10/2021    Essential tremor 2/10/2021    Heart disease     Hypertension     Obesity        Past Surgical History:   Procedure Laterality Date    CARPAL TUNNEL RELEASE Right 05/09/2023    Procedure: Right carpal tunnel release;  Surgeon: Kash Sahni MD;  Location: [facility] OR;  Service: Orthopedics;  Laterality: Right;    CARPAL TUNNEL RELEASE Left 8/3/2023    Procedure: Left carpal tunnel release;  Surgeon: Kash Sahni MD;  Location: [facility] OR;  Service: Orthopedics;  Laterality: Left;    EPIDURAL STEROID INJECTION INTO CERVICAL SPINE N/A 06/07/2023    Procedure: Injection-steroid-epidural-cervical C7-T1;  Surgeon: Aileen Ocampo DO;  Location: [facility] PAIN MANAGEMENT;  Service: Pain Management;  Laterality: N/A;    ESOPHAGEAL DILATION N/A 05/20/2022    Procedure: DILATION, ESOPHAGUS;  Surgeon: Jose Aguilear MD;  Location: [facility];  Service: Endoscopy;  Laterality: N/A;  [place]ates    ESOPHAGOGASTRODUODENOSCOPY N/A 05/20/2022    Procedure: EGD (ESOPHAGOGASTRODUODENOSCOPY);  Surgeon: Jose Aguilera MD;  Location: [facility];  Service: Endoscopy;  Laterality: N/A;    REMOVAL-MASS      of thyroid    TUBAL LIGATION         Current Outpatient Medications   Medication Sig    chlorthalidone (HYGROTEN) 25 MG Tab Take 1 tablet (25 mg total) by mouth once daily.    ergocalciferol (ERGOCALCIFEROL) 50,000 unit Cap Take 50,000 Units by mouth as needed.    hydroCHLOROthiazide (HYDRODIURIL) 25 MG tablet Take 1 tablet (25 mg total) by mouth once daily.    HYDROcodone-acetaminophen (NORCO) 5-325 mg per tablet Take 1 tablet by mouth every 6 (six) hours as needed for Pain.    ibuprofen (ADVIL,MOTRIN) 200 MG tablet Take 600 mg by mouth every 8 (eight) hours as needed.    omeprazole (PRILOSEC) 40 MG capsule omeprazole 40 mg capsule,delayed release   TAKE ONE CAPSULE BY MOUTH ONCE DAILY.    potassium chloride SA  (K-DUR,KLOR-CON) 20 MEQ tablet potassium chloride ER 20 mEq tablet,extended release(part/cryst)   TAKE ONE TABLET BY MOUTH ONCE DAILY    pregabalin (LYRICA) 50 MG capsule Take 1 capsule (50 mg total) by mouth 3 (three) times daily.     No current facility-administered medications for this visit.       Review of patient's allergies indicates:  No Known Allergies    Family History   Problem Relation Age of Onset    Heart disease Father     Tremor Father     Ovarian cancer Maternal Aunt         20's    Cancer Maternal Grandmother     Colon cancer Neg Hx        Social History     Socioeconomic History    Marital status:    Tobacco Use    Smoking status: Never   Substance and Sexual Activity    Alcohol use: Never    Drug use: Never    Sexual activity: Yes     Partners: Male     Birth control/protection: None       Chief Complaint:   Chief Complaint   Patient presents with    Knee Pain     follow up B knee       History of present illness:  This is a 57-year-old female seen for chronic bilateral knee pain.  Left greater than right.  Patient has had pain for 3-4 years now.  Patient has been taking ibuprofen as well as getting periodic injections.  Patient just recently switched doctors.  She is had both cortisone as well as viscosupplementation.  Viscosupplementation does not help.  Pain is a 9/10.  We tried Zilretta and it did not really help much either.      Review of Systems:    Constitution: Negative for chills, fever, and sweats.  Negative for unexplained weight loss.    HENT:  Negative for headaches and blurry vision.    Cardiovascular:Negative for chest pain or irregular heart beat. Negative for hypertension.    Respiratory:  Negative for cough and shortness of breath.    Gastrointestinal: Negative for abdominal pain, heartburn, melena, nausea, and vomitting.    Genitourinary:  Negative bladder incontinence and dysuria.    Musculoskeletal:  See HPI    Neurological: Negative for  numbness.    Psychiatric/Behavioral: Negative for depression.  The patient is not nervous/anxious.      Endocrine: Negative for polyuria    Hematologic/Lymphatic: Negative for bleeding problem.  Does not bruise/bleed easily.    Skin: Negative for poor would healing and rash      Physical Examination:    Vital Signs:    Vitals:    08/09/23 1432   Resp: 18       Body mass index is 54.49 kg/m².    This a well-developed, well nourished patient in no acute distress.  They are alert and oriented and cooperative to examination.  Pt. walks without an antalgic gait.      Examination of bilateral knees shows no rashes or erythema. There are no masses ecchymosis or effusion. Patient has full range of motion from 0-130°. Patient is nontender to palpation over lateral joint line and moderately tender to palpation over the medial joint line. Patient has a - Lachman exam, - anterior drawer exam, and - posterior drawer exam. - Lalitha's exam. Knee is stable to varus and valgus stress. 5 out of 5 motor strength. Palpable distal pulses. Intact light touch sensation. Negative Patellofemoral crepitus      X-rays:  X-rays of both knees are  reviewed which show severe medial joint space narrowing bilaterally     Assessment::  Severe bilateral varus knee arthritis    Plan:  I agreed to inject both knees with cortisone today.  We will place a referral for for bariatric and comprehensive late loss.  Patient might be a good candidate for Ozempic or another medical weight loss treatment at this time if not surgery.    This note was created using "[employer] voice recognition software that occasionally misinterpreted phrases or words.    Consult note is delivered via Epic messaging service.

## 2023-08-10 ENCOUNTER — TELEPHONE (OUTPATIENT)
Dept: BARIATRICS | Facility: CLINIC | Age: 58
End: 2023-08-10
Payer: COMMERCIAL

## 2023-08-10 DIAGNOSIS — E66.01 MORBID OBESITY: Primary | ICD-10-CM

## 2023-08-11 ENCOUNTER — PATIENT MESSAGE (OUTPATIENT)
Dept: ORTHOPEDICS | Facility: CLINIC | Age: 58
End: 2023-08-11
Payer: COMMERCIAL

## 2023-08-14 ENCOUNTER — PATIENT MESSAGE (OUTPATIENT)
Dept: ORTHOPEDICS | Facility: CLINIC | Age: 58
End: 2023-08-14
Payer: COMMERCIAL

## 2023-08-14 ENCOUNTER — PATIENT MESSAGE (OUTPATIENT)
Dept: OBSTETRICS AND GYNECOLOGY | Facility: CLINIC | Age: 58
End: 2023-08-14
Payer: COMMERCIAL

## 2023-08-14 DIAGNOSIS — E66.01 MORBID OBESITY: Primary | ICD-10-CM

## 2023-08-16 ENCOUNTER — OFFICE VISIT (OUTPATIENT)
Dept: ORTHOPEDICS | Facility: CLINIC | Age: 58
End: 2023-08-16
Payer: MEDICAID

## 2023-08-16 DIAGNOSIS — Z98.890 STATUS POST CARPAL TUNNEL RELEASE: Primary | ICD-10-CM

## 2023-08-16 PROCEDURE — 99024 POSTOP FOLLOW-UP VISIT: CPT | Mod: ,,, | Performed by: ORTHOPAEDIC SURGERY

## 2023-08-16 PROCEDURE — 4010F PR ACE/ARB THEARPY RXD/TAKEN: ICD-10-PCS | Mod: CPTII,,, | Performed by: ORTHOPAEDIC SURGERY

## 2023-08-16 PROCEDURE — 99999 PR PBB SHADOW E&M-EST. PATIENT-LVL II: CPT | Mod: PBBFAC,,, | Performed by: ORTHOPAEDIC SURGERY

## 2023-08-16 PROCEDURE — 4010F ACE/ARB THERAPY RXD/TAKEN: CPT | Mod: CPTII,,, | Performed by: ORTHOPAEDIC SURGERY

## 2023-08-16 PROCEDURE — 99024 PR POST-OP FOLLOW-UP VISIT: ICD-10-PCS | Mod: ,,, | Performed by: ORTHOPAEDIC SURGERY

## 2023-08-16 PROCEDURE — 99212 OFFICE O/P EST SF 10 MIN: CPT | Mod: PBBFAC,PN | Performed by: ORTHOPAEDIC SURGERY

## 2023-08-16 PROCEDURE — 1159F MED LIST DOCD IN RCRD: CPT | Mod: CPTII,,, | Performed by: ORTHOPAEDIC SURGERY

## 2023-08-16 PROCEDURE — 99999 PR PBB SHADOW E&M-EST. PATIENT-LVL II: ICD-10-PCS | Mod: PBBFAC,,, | Performed by: ORTHOPAEDIC SURGERY

## 2023-08-16 PROCEDURE — 1159F PR MEDICATION LIST DOCUMENTED IN MEDICAL RECORD: ICD-10-PCS | Mod: CPTII,,, | Performed by: ORTHOPAEDIC SURGERY

## 2023-08-16 NOTE — PROGRESS NOTES
Mignon Hardy is a 58 y.o. female who is approximately 2 weeks status post left carpal tunnel release. She is doing very well. She states that the majority of carpal tunnel symptoms are improved or resolved.    Physical exam: Examination of the left hand reveals that the incision is healing well. There is no significant edema,  erythema or drainage. Sensation is grossly intact median radial and ulnar distributions. Motor function of the thenar musculature is intact. Capillary refill less than 2 seconds in all of the digits. The Patient is able to make a full composite fist and fully extend the fingers without significant pain.    Assessment: Status post left carpal tunnel release    Plan:    1. Sutures were removed today and Steri-Strips are placed    2. Can begin hand washing in running water tomorrow    3. Continue a 2-3 pound weight limit to the arm and hand    4. Follow up with me in 2 weeks for repeat evaluation

## 2023-09-06 ENCOUNTER — OFFICE VISIT (OUTPATIENT)
Dept: ORTHOPEDICS | Facility: CLINIC | Age: 58
End: 2023-09-06
Payer: MEDICAID

## 2023-09-06 DIAGNOSIS — Z98.890 STATUS POST CARPAL TUNNEL RELEASE: Primary | ICD-10-CM

## 2023-09-06 PROCEDURE — 1159F PR MEDICATION LIST DOCUMENTED IN MEDICAL RECORD: ICD-10-PCS | Mod: CPTII,,, | Performed by: PHYSICIAN ASSISTANT

## 2023-09-06 PROCEDURE — 1160F RVW MEDS BY RX/DR IN RCRD: CPT | Mod: CPTII,,, | Performed by: PHYSICIAN ASSISTANT

## 2023-09-06 PROCEDURE — 99024 PR POST-OP FOLLOW-UP VISIT: ICD-10-PCS | Mod: ,,, | Performed by: PHYSICIAN ASSISTANT

## 2023-09-06 PROCEDURE — 99212 OFFICE O/P EST SF 10 MIN: CPT | Mod: PBBFAC,PN | Performed by: PHYSICIAN ASSISTANT

## 2023-09-06 PROCEDURE — 1159F MED LIST DOCD IN RCRD: CPT | Mod: CPTII,,, | Performed by: PHYSICIAN ASSISTANT

## 2023-09-06 PROCEDURE — 99024 POSTOP FOLLOW-UP VISIT: CPT | Mod: ,,, | Performed by: PHYSICIAN ASSISTANT

## 2023-09-06 PROCEDURE — 4010F ACE/ARB THERAPY RXD/TAKEN: CPT | Mod: CPTII,,, | Performed by: PHYSICIAN ASSISTANT

## 2023-09-06 PROCEDURE — 99999 PR PBB SHADOW E&M-EST. PATIENT-LVL II: ICD-10-PCS | Mod: PBBFAC,,, | Performed by: PHYSICIAN ASSISTANT

## 2023-09-06 PROCEDURE — 1160F PR REVIEW ALL MEDS BY PRESCRIBER/CLIN PHARMACIST DOCUMENTED: ICD-10-PCS | Mod: CPTII,,, | Performed by: PHYSICIAN ASSISTANT

## 2023-09-06 PROCEDURE — 4010F PR ACE/ARB THEARPY RXD/TAKEN: ICD-10-PCS | Mod: CPTII,,, | Performed by: PHYSICIAN ASSISTANT

## 2023-09-06 PROCEDURE — 99999 PR PBB SHADOW E&M-EST. PATIENT-LVL II: CPT | Mod: PBBFAC,,, | Performed by: PHYSICIAN ASSISTANT

## 2023-09-06 NOTE — PROGRESS NOTES
Mignon Hardy is a 58 y.o. female who presents to clinic for follow-up status post left carpal tunnel release.  She is approximally 5 weeks status post surgery.  States overall she is doing pretty well.  States she has some hypersensitivity of the surgical site and sharp pains with certain activities.  States the majority of her carpal tunnel symptoms have completely resolved.  Denies any acute injuries since last visit.  Denies any other complaints at this time.    Physical Exam:  Examination of the left hand reveals a well-healed surgical site.  No edema, erythema, ecchymosis, or skin breakdown.  Presence of mild hypersensitivity over the surgical site.  Able make composite fist and fully extend all fingers.  Thenar motor function remains intact.  Sensation is grossly intact in the radial, ulnar, and median nerve distributions.  Capillary refill less than 2 seconds in all fingers.    Radiology:  None.    Assessment:  Status post left carpal tunnel release    Plan:  1. I discussed with Mignon Hardy that she is progressing appropriately in the treatment course.  We discussed the best course of action this time is to begin gentle scar massage via lotion/cocoa butter daily and till seen again in clinic.  We also discussed importance of beginning some stretching/range of motion exercises of the left wrist as demonstrated in clinic.  We discussed importance of avoiding any heavy weight-bearing activities until seen again in clinic.  She verbally agreed with the treatment plan.      2. I would like him to follow up in clinic in 4 weeks for a final evaluation.  She was instructed to contact clinic for any problems or concerns in the interim.

## 2023-09-07 ENCOUNTER — TELEPHONE (OUTPATIENT)
Dept: NEUROSURGERY | Facility: CLINIC | Age: 58
End: 2023-09-07
Payer: COMMERCIAL

## 2023-10-06 ENCOUNTER — OFFICE VISIT (OUTPATIENT)
Dept: OBSTETRICS AND GYNECOLOGY | Facility: CLINIC | Age: 58
End: 2023-10-06
Payer: MEDICAID

## 2023-10-06 VITALS
BODY MASS INDEX: 43.4 KG/M2 | HEIGHT: 69 IN | SYSTOLIC BLOOD PRESSURE: 142 MMHG | DIASTOLIC BLOOD PRESSURE: 82 MMHG | WEIGHT: 293 LBS

## 2023-10-06 DIAGNOSIS — N95.0 POST-MENOPAUSAL BLEEDING: Primary | ICD-10-CM

## 2023-10-06 DIAGNOSIS — Z01.810 PREOP CARDIOVASCULAR EXAM: ICD-10-CM

## 2023-10-06 PROCEDURE — 3077F SYST BP >= 140 MM HG: CPT | Mod: CPTII,,, | Performed by: STUDENT IN AN ORGANIZED HEALTH CARE EDUCATION/TRAINING PROGRAM

## 2023-10-06 PROCEDURE — 3044F HG A1C LEVEL LT 7.0%: CPT | Mod: CPTII,,, | Performed by: STUDENT IN AN ORGANIZED HEALTH CARE EDUCATION/TRAINING PROGRAM

## 2023-10-06 PROCEDURE — 3008F BODY MASS INDEX DOCD: CPT | Mod: CPTII,,, | Performed by: STUDENT IN AN ORGANIZED HEALTH CARE EDUCATION/TRAINING PROGRAM

## 2023-10-06 PROCEDURE — 99999 PR PBB SHADOW E&M-EST. PATIENT-LVL III: CPT | Mod: PBBFAC,,, | Performed by: STUDENT IN AN ORGANIZED HEALTH CARE EDUCATION/TRAINING PROGRAM

## 2023-10-06 PROCEDURE — 99499 NO LOS: ICD-10-PCS | Mod: S$PBB,,, | Performed by: STUDENT IN AN ORGANIZED HEALTH CARE EDUCATION/TRAINING PROGRAM

## 2023-10-06 PROCEDURE — 3079F DIAST BP 80-89 MM HG: CPT | Mod: CPTII,,, | Performed by: STUDENT IN AN ORGANIZED HEALTH CARE EDUCATION/TRAINING PROGRAM

## 2023-10-06 PROCEDURE — 99499 UNLISTED E&M SERVICE: CPT | Mod: S$PBB,,, | Performed by: STUDENT IN AN ORGANIZED HEALTH CARE EDUCATION/TRAINING PROGRAM

## 2023-10-06 PROCEDURE — 4010F PR ACE/ARB THEARPY RXD/TAKEN: ICD-10-PCS | Mod: CPTII,,, | Performed by: STUDENT IN AN ORGANIZED HEALTH CARE EDUCATION/TRAINING PROGRAM

## 2023-10-06 PROCEDURE — 3008F PR BODY MASS INDEX (BMI) DOCUMENTED: ICD-10-PCS | Mod: CPTII,,, | Performed by: STUDENT IN AN ORGANIZED HEALTH CARE EDUCATION/TRAINING PROGRAM

## 2023-10-06 PROCEDURE — 3077F PR MOST RECENT SYSTOLIC BLOOD PRESSURE >= 140 MM HG: ICD-10-PCS | Mod: CPTII,,, | Performed by: STUDENT IN AN ORGANIZED HEALTH CARE EDUCATION/TRAINING PROGRAM

## 2023-10-06 PROCEDURE — 1159F MED LIST DOCD IN RCRD: CPT | Mod: CPTII,,, | Performed by: STUDENT IN AN ORGANIZED HEALTH CARE EDUCATION/TRAINING PROGRAM

## 2023-10-06 PROCEDURE — 1159F PR MEDICATION LIST DOCUMENTED IN MEDICAL RECORD: ICD-10-PCS | Mod: CPTII,,, | Performed by: STUDENT IN AN ORGANIZED HEALTH CARE EDUCATION/TRAINING PROGRAM

## 2023-10-06 PROCEDURE — 4010F ACE/ARB THERAPY RXD/TAKEN: CPT | Mod: CPTII,,, | Performed by: STUDENT IN AN ORGANIZED HEALTH CARE EDUCATION/TRAINING PROGRAM

## 2023-10-06 PROCEDURE — 3079F PR MOST RECENT DIASTOLIC BLOOD PRESSURE 80-89 MM HG: ICD-10-PCS | Mod: CPTII,,, | Performed by: STUDENT IN AN ORGANIZED HEALTH CARE EDUCATION/TRAINING PROGRAM

## 2023-10-06 PROCEDURE — 99213 OFFICE O/P EST LOW 20 MIN: CPT | Mod: PBBFAC,PN | Performed by: STUDENT IN AN ORGANIZED HEALTH CARE EDUCATION/TRAINING PROGRAM

## 2023-10-06 PROCEDURE — 99999 PR PBB SHADOW E&M-EST. PATIENT-LVL III: ICD-10-PCS | Mod: PBBFAC,,, | Performed by: STUDENT IN AN ORGANIZED HEALTH CARE EDUCATION/TRAINING PROGRAM

## 2023-10-06 PROCEDURE — 3044F PR MOST RECENT HEMOGLOBIN A1C LEVEL <7.0%: ICD-10-PCS | Mod: CPTII,,, | Performed by: STUDENT IN AN ORGANIZED HEALTH CARE EDUCATION/TRAINING PROGRAM

## 2023-10-06 RX ORDER — IBUPROFEN 800 MG/1
800 TABLET ORAL EVERY 6 HOURS PRN
Qty: 30 TABLET | Refills: 0 | Status: SHIPPED | OUTPATIENT
Start: 2023-10-06 | End: 2024-03-27

## 2023-10-06 RX ORDER — FAMOTIDINE 20 MG/1
20 TABLET, FILM COATED ORAL
Status: SHIPPED | OUTPATIENT
Start: 2023-10-06

## 2023-10-06 RX ORDER — SODIUM CHLORIDE 9 MG/ML
INJECTION, SOLUTION INTRAVENOUS CONTINUOUS
Status: CANCELLED | OUTPATIENT
Start: 2023-10-06

## 2023-10-06 NOTE — PROGRESS NOTES
History & Physical  Gynecology      SUBJECTIVE:     Chief Complaint: Pre-op Exam       History of Present Illness:    Here today for preop for hscope D&C for PMB. TVUS below. No new changes.     TVUS:  TECHNIQUE:  Transabdominal sonography of the pelvis was performed, followed by transvaginal sonography to better evaluate the uterus and ovaries.     COMPARISON:  None.     FINDINGS:  Uterus:     Size: 10.7 x 6.4 x 9.4 cm     Masses: Right anterior uterine body intramural fibroid measures 4.7 x 4.3 x 5.0 cm.  Posterior uterine body intramural fibroid measures 1.7 x 1.6 x 1.4.  Left anterior uterine body intramural fibroid measures 5.1 x 5.0 x 3.6 cm.  Posterior lower uterine segment intramural fibroid measures 1.7 x 1.3 x 2.2 cm.  Nabothian cysts are present.     Endometrium: Normal in this post menopausal patient, measuring 3-4 mm.     The ovaries are not identified.     Free Fluid:     None.     Impression:     Leiomyomatous uterus.    Review of patient's allergies indicates:  No Known Allergies    Past Medical History:   Diagnosis Date    Chronic bilateral low back pain with left-sided sciatica 2/10/2021    Essential tremor 2/10/2021    Heart disease     Hypertension     Obesity      Past Surgical History:   Procedure Laterality Date    CARPAL TUNNEL RELEASE Right 05/09/2023    Procedure: Right carpal tunnel release;  Surgeon: Kash Sahni MD;  Location: [facility] OR;  Service: Orthopedics;  Laterality: Right;    CARPAL TUNNEL RELEASE Left 8/3/2023    Procedure: Left carpal tunnel release;  Surgeon: Kash Sahni MD;  Location: [facility] OR;  Service: Orthopedics;  Laterality: Left;    EPIDURAL STEROID INJECTION INTO CERVICAL SPINE N/A 06/07/2023    Procedure: Injection-steroid-epidural-cervical C7-T1;  Surgeon: Aileen Ocampo DO;  [facility];  Service: Pain Management;  Laterality: N/A;    ESOPHAGEAL DILATION N/A 05/20/2022    Procedure: DILATION, ESOPHAGUS;  Surgeon: Jose VELAZQUEZ  MD Willy;  Location: [facility] ENDO;  Service: Endoscopy;  Laterality: N/A;  [place]    ESOPHAGOGASTRODUODENOSCOPY N/A 2022    Procedure: EGD (ESOPHAGOGASTRODUODENOSCOPY);  Surgeon: Jose Aguilera MD;  Location: [facility] ENDO;  Service: Endoscopy;  Laterality: N/A;    REMOVAL-MASS      of thyroid    TUBAL LIGATION       OB History          2    Para   2    Term   2            AB        Living   2         SAB        IAB        Ectopic        Multiple        Live Births   2               Family History   Problem Relation Age of Onset    Heart disease Father     Tremor Father     Ovarian cancer Maternal Aunt         20's    Cancer Maternal Grandmother     Colon cancer Neg Hx      Social History     Tobacco Use    Smoking status: Never   Substance Use Topics    Alcohol use: Never    Drug use: Never       Current Outpatient Medications   Medication Sig    chlorthalidone (HYGROTEN) 25 MG Tab Take 1 tablet (25 mg total) by mouth once daily.    ergocalciferol (ERGOCALCIFEROL) 50,000 unit Cap Take 50,000 Units by mouth as needed.    hydroCHLOROthiazide (HYDRODIURIL) 25 MG tablet Take 1 tablet (25 mg total) by mouth once daily.    HYDROcodone-acetaminophen (NORCO) 5-325 mg per tablet Take 1 tablet by mouth every 6 (six) hours as needed for Pain.    ibuprofen (ADVIL,MOTRIN) 200 MG tablet Take 600 mg by mouth every 8 (eight) hours as needed.    ibuprofen (ADVIL,MOTRIN) 800 MG tablet Take 1 tablet (800 mg total) by mouth every 6 (six) hours as needed for Pain.    omeprazole (PRILOSEC) 40 MG capsule omeprazole 40 mg capsule,delayed release   TAKE ONE CAPSULE BY MOUTH ONCE DAILY.    potassium chloride SA (K-DUR,KLOR-CON) 20 MEQ tablet potassium chloride ER 20 mEq tablet,extended release(part/cryst)   TAKE ONE TABLET BY MOUTH ONCE DAILY    pregabalin (LYRICA) 50 MG capsule Take 1 capsule (50 mg total) by mouth 3 (three) times daily.     Current Facility-Administered Medications   Medication    famotidine tablet  20 mg         Review of Systems:  Review of Systems   Constitutional:  Negative for chills, fatigue and fever.   HENT:  Negative for congestion.    Eyes:  Negative for visual disturbance.   Respiratory:  Negative for cough and shortness of breath.    Cardiovascular:  Negative for chest pain and palpitations.   Gastrointestinal:  Negative for abdominal distention, abdominal pain, constipation, diarrhea, nausea and vomiting.   Genitourinary:  Negative for difficulty urinating, dysuria, hematuria, vaginal bleeding and vaginal discharge.   Skin:  Negative for rash.   Neurological:  Negative for dizziness, seizures, light-headedness and headaches.   Hematological:  Does not bruise/bleed easily.   Psychiatric/Behavioral:  Negative for dysphoric mood. The patient is not nervous/anxious.         OBJECTIVE:     Physical Exam:  Physical Exam  Vitals reviewed.   Constitutional:       General: She is not in acute distress.     Appearance: Normal appearance. She is well-developed.   HENT:      Head: Normocephalic and atraumatic.   Cardiovascular:      Rate and Rhythm: Normal rate and regular rhythm.   Pulmonary:      Effort: Pulmonary effort is normal.   Abdominal:      General: There is no distension.      Palpations: Abdomen is soft.   Genitourinary:     Vagina: Normal.   Skin:     General: Skin is warm.   Neurological:      Mental Status: She is alert and oriented to person, place, and time.   Psychiatric:         Behavior: Behavior normal.         Thought Content: Thought content normal.         Judgment: Judgment normal.           ASSESSMENT:       ICD-10-CM ICD-9-CM    1. Post-menopausal bleeding  N95.0 627.1 Full code      Insert peripheral IV      Back to [place]      POCT glucose      Notify physician if BS > 180 for hysterectomy patients      Notify Physician/Vital Signs Parameters Urine output less than 0.5mL/kg/hr (with indwelling catheter) or 30 mL/hr (without indwelling catheter) or blood glucose greater than 200  mg/dL      Notify physician      Notify Physician - Potential Need of Opioid Reversal      Diet NPO      Chlorohexidine Gluconate Bath      Place in Outpatient      Place sequential compression device      Pregnancy, urine rapid      EKG 12-lead      Full code      Insert peripheral IV      Diet NPO      Place sequential compression device      2. Preop cardiovascular exam  Z01.810 V72.81 ibuprofen (ADVIL,MOTRIN) 800 MG tablet          Orders Placed This Encounter   Procedures    Diet NPO     Standing Status:   Standing     Number of Occurrences:   1    Place sequential compression device     Standing Status:   Standing     Number of Occurrences:   1    Full code     Standing Status:   Standing     Number of Occurrences:   1    Insert peripheral IV     Standing Status:   Standing     Number of Occurrences:   1           Plan:      Patient is to have  hscope D&C  for PMB    - Labs: not obtained  - CXR/EKG: ordered, but not yet obtained  - PAP: Normal  - TVUS as above  - EMBX n/a  - Wet prep - n/a  - Medical clearance required: No  - Case request placed & pre-op orders completed  - Anticoagulation : Patient is NOT on antiocoagulation.   - I have discussed the risks, benefits, indications, and alternatives of the procedure in detail.  The patient verbalizes her understanding.  All questions answered.  Consents signed.  The patient agrees to proceed to proceed as planned.  - R/B/A of hysteroscopy/D&C reviewed in detail. The risk of infection was discussed. Small risk of cervical laceration or uterine perforation was discussed as well as the small risk of the need for diagnostic laparoscopy. The risk of bleeding was discussed in the event of a uterine perforation as well as the possible need for blood transfusion. Patient voices understanding and desires to proceed.    - To pre-op  - MEDS sent    Noa Farias M.D.  Obstetrics and Gynecology

## 2023-10-20 PROBLEM — Z98.890 STATUS POST HYSTEROSCOPY: Status: ACTIVE | Noted: 2023-10-20

## 2023-11-03 ENCOUNTER — OFFICE VISIT (OUTPATIENT)
Dept: OBSTETRICS AND GYNECOLOGY | Facility: CLINIC | Age: 58
End: 2023-11-03
Payer: MEDICAID

## 2023-11-03 VITALS
WEIGHT: 293 LBS | DIASTOLIC BLOOD PRESSURE: 74 MMHG | SYSTOLIC BLOOD PRESSURE: 128 MMHG | HEIGHT: 67 IN | BODY MASS INDEX: 45.99 KG/M2

## 2023-11-03 DIAGNOSIS — Z98.890 STATUS POST HYSTEROSCOPY: Primary | ICD-10-CM

## 2023-11-03 PROCEDURE — 3074F SYST BP LT 130 MM HG: CPT | Mod: CPTII,,, | Performed by: STUDENT IN AN ORGANIZED HEALTH CARE EDUCATION/TRAINING PROGRAM

## 2023-11-03 PROCEDURE — 3008F PR BODY MASS INDEX (BMI) DOCUMENTED: ICD-10-PCS | Mod: CPTII,,, | Performed by: STUDENT IN AN ORGANIZED HEALTH CARE EDUCATION/TRAINING PROGRAM

## 2023-11-03 PROCEDURE — 99213 PR OFFICE/OUTPT VISIT, EST, LEVL III, 20-29 MIN: ICD-10-PCS | Mod: S$PBB,,, | Performed by: STUDENT IN AN ORGANIZED HEALTH CARE EDUCATION/TRAINING PROGRAM

## 2023-11-03 PROCEDURE — 3078F DIAST BP <80 MM HG: CPT | Mod: CPTII,,, | Performed by: STUDENT IN AN ORGANIZED HEALTH CARE EDUCATION/TRAINING PROGRAM

## 2023-11-03 PROCEDURE — 99999 PR PBB SHADOW E&M-EST. PATIENT-LVL III: ICD-10-PCS | Mod: PBBFAC,,, | Performed by: STUDENT IN AN ORGANIZED HEALTH CARE EDUCATION/TRAINING PROGRAM

## 2023-11-03 PROCEDURE — 99213 OFFICE O/P EST LOW 20 MIN: CPT | Mod: PBBFAC,PN | Performed by: STUDENT IN AN ORGANIZED HEALTH CARE EDUCATION/TRAINING PROGRAM

## 2023-11-03 PROCEDURE — 4010F ACE/ARB THERAPY RXD/TAKEN: CPT | Mod: CPTII,,, | Performed by: STUDENT IN AN ORGANIZED HEALTH CARE EDUCATION/TRAINING PROGRAM

## 2023-11-03 PROCEDURE — 99999 PR PBB SHADOW E&M-EST. PATIENT-LVL III: CPT | Mod: PBBFAC,,, | Performed by: STUDENT IN AN ORGANIZED HEALTH CARE EDUCATION/TRAINING PROGRAM

## 2023-11-03 PROCEDURE — 3074F PR MOST RECENT SYSTOLIC BLOOD PRESSURE < 130 MM HG: ICD-10-PCS | Mod: CPTII,,, | Performed by: STUDENT IN AN ORGANIZED HEALTH CARE EDUCATION/TRAINING PROGRAM

## 2023-11-03 PROCEDURE — 3008F BODY MASS INDEX DOCD: CPT | Mod: CPTII,,, | Performed by: STUDENT IN AN ORGANIZED HEALTH CARE EDUCATION/TRAINING PROGRAM

## 2023-11-03 PROCEDURE — 4010F PR ACE/ARB THEARPY RXD/TAKEN: ICD-10-PCS | Mod: CPTII,,, | Performed by: STUDENT IN AN ORGANIZED HEALTH CARE EDUCATION/TRAINING PROGRAM

## 2023-11-03 PROCEDURE — 3078F PR MOST RECENT DIASTOLIC BLOOD PRESSURE < 80 MM HG: ICD-10-PCS | Mod: CPTII,,, | Performed by: STUDENT IN AN ORGANIZED HEALTH CARE EDUCATION/TRAINING PROGRAM

## 2023-11-03 PROCEDURE — 99213 OFFICE O/P EST LOW 20 MIN: CPT | Mod: S$PBB,,, | Performed by: STUDENT IN AN ORGANIZED HEALTH CARE EDUCATION/TRAINING PROGRAM

## 2023-11-03 PROCEDURE — 3044F HG A1C LEVEL LT 7.0%: CPT | Mod: CPTII,,, | Performed by: STUDENT IN AN ORGANIZED HEALTH CARE EDUCATION/TRAINING PROGRAM

## 2023-11-03 PROCEDURE — 1159F MED LIST DOCD IN RCRD: CPT | Mod: CPTII,,, | Performed by: STUDENT IN AN ORGANIZED HEALTH CARE EDUCATION/TRAINING PROGRAM

## 2023-11-03 PROCEDURE — 3044F PR MOST RECENT HEMOGLOBIN A1C LEVEL <7.0%: ICD-10-PCS | Mod: CPTII,,, | Performed by: STUDENT IN AN ORGANIZED HEALTH CARE EDUCATION/TRAINING PROGRAM

## 2023-11-03 PROCEDURE — 1159F PR MEDICATION LIST DOCUMENTED IN MEDICAL RECORD: ICD-10-PCS | Mod: CPTII,,, | Performed by: STUDENT IN AN ORGANIZED HEALTH CARE EDUCATION/TRAINING PROGRAM

## 2023-11-03 NOTE — PROGRESS NOTES
History & Physical  Gynecology      SUBJECTIVE:     Chief Complaint: Post-op Evaluation (2 wk Post Op)       History of Present Illness:    Here today s/p hscope D&C 2 weeks ago. No further bleeding. Doing well.    Pathology/Findings:  ENDOMETRIUM, CURETTAGE   - SCANT FRAGMENTS OF ENDOMETRIAL GLANDULAR AND STROMAL BREAKDOWN   - BENIGN ENDOCERVICAL MUCOSA     Findings:    Uterus sounded to11 cm.  Hysteroscopic findings include: atrophic appearing endometrium  Specimens obtained and sent to pathology.  Hemostasis obtained at the end of the procedure, fluid deficit 0cc. Cystocele and rectocele, Cervix with good descensus.      Review of patient's allergies indicates:  No Known Allergies    Past Medical History:   Diagnosis Date    Arthritis     Bilateral Knee Arthritis    Chronic bilateral low back pain with left-sided sciatica 02/10/2021    Essential tremor 02/10/2021    Heart disease     Hypertension     Obesity      Past Surgical History:   Procedure Laterality Date    CARPAL TUNNEL RELEASE Right 05/09/2023    Procedure: Right carpal tunnel release;  Surgeon: Kash Sahni MD;  Location: [facility] OR;  Service: Orthopedics;  Laterality: Right;    CARPAL TUNNEL RELEASE Left 08/03/2023    Procedure: Left carpal tunnel release;  Surgeon: Kash Sahni MD;  Location: [facility] OR;  Service: Orthopedics;  Laterality: Left;    EPIDURAL STEROID INJECTION INTO CERVICAL SPINE N/A 06/07/2023    Procedure: Injection-steroid-epidural-cervical C7-T1;  Surgeon: Aileen Ocampo DO;  Location: [facility] PAIN MANAGEMENT;  Service: Pain Management;  Laterality: N/A;    ESOPHAGEAL DILATION N/A 05/20/2022    Procedure: DILATION, ESOPHAGUS;  Surgeon: Jose Aguilera MD;  Location: [facility];  Service: Endoscopy;  Laterality: N/A;  [place]    ESOPHAGOGASTRODUODENOSCOPY N/A 05/20/2022    Procedure: EGD (ESOPHAGOGASTRODUODENOSCOPY);  Surgeon: Jose Aguilera MD;  Location: [facility];  Service: Endoscopy;  Laterality: N/A;     HYSTEROSCOPY WITH DILATION AND CURETTAGE OF UTERUS N/A 10/20/2023    Procedure: HYSTEROSCOPY, WITH DILATION AND CURETTAGE OF UTERUS;  Surgeon: Noa Farias MD;  Location: [facility] OR;  Service: OB/GYN;  Laterality: N/A;    REMOVAL-MASS      of thyroid    TUBAL LIGATION       OB History          2    Para   2    Term   2            AB        Living   2         SAB        IAB        Ectopic        Multiple        Live Births   2               Family History   Problem Relation Age of Onset    Thyroid cancer Mother     Cancer Mother         breast    Dementia Mother     Heart disease Father     Tremor Father     Pacemaker/defibrilator Father     Thyroid cancer Sister     Ovarian cancer Maternal Aunt         20's    Cancer Maternal Grandmother     Colon cancer Neg Hx      Social History     Tobacco Use    Smoking status: Never    Smokeless tobacco: Never   Substance Use Topics    Alcohol use: Never    Drug use: Never       Current Outpatient Medications   Medication Sig    chlorthalidone (HYGROTEN) 25 MG Tab Take 1 tablet (25 mg total) by mouth once daily.    ergocalciferol (ERGOCALCIFEROL) 50,000 unit Cap Take 50,000 Units by mouth as needed.    famotidine (PEPCID) 20 MG tablet Take 20 mg by mouth 2 (two) times daily.    ibuprofen (ADVIL,MOTRIN) 200 MG tablet Take 600 mg by mouth every 8 (eight) hours as needed for Pain.    ibuprofen (ADVIL,MOTRIN) 800 MG tablet Take 1 tablet (800 mg total) by mouth every 6 (six) hours as needed for Pain.    potassium chloride SA (K-DUR,KLOR-CON) 20 MEQ tablet potassium chloride ER 20 mEq tablet,extended release(part/cryst)   TAKE ONE TABLET BY MOUTH ONCE DAILY    pregabalin (LYRICA) 50 MG capsule Take 1 capsule (50 mg total) by mouth 3 (three) times daily.     Current Facility-Administered Medications   Medication    famotidine tablet 20 mg     Facility-Administered Medications Ordered in Other Visits   Medication    dextrose 50% injection 12.5 g    dextrose 50% injection  25 g    insulin regular injection 0-8 Units    lactated ringers infusion    LIDOcaine (PF) 10 mg/ml (1%) injection 10 mg         Review of Systems:  Review of Systems   Constitutional:  Negative for chills, fatigue and fever.   HENT:  Negative for congestion.    Eyes:  Negative for visual disturbance.   Respiratory:  Negative for cough and shortness of breath.    Cardiovascular:  Negative for chest pain and palpitations.   Gastrointestinal:  Negative for abdominal distention, abdominal pain, constipation, diarrhea, nausea and vomiting.   Genitourinary:  Negative for difficulty urinating, dysuria, hematuria, vaginal bleeding and vaginal discharge.   Skin:  Negative for rash.   Neurological:  Negative for dizziness, seizures, light-headedness and headaches.   Hematological:  Does not bruise/bleed easily.   Psychiatric/Behavioral:  Negative for dysphoric mood. The patient is not nervous/anxious.         OBJECTIVE:     Physical Exam:  Physical Exam  Vitals reviewed.   Constitutional:       General: She is not in acute distress.     Appearance: Normal appearance. She is well-developed.   HENT:      Head: Normocephalic and atraumatic.   Cardiovascular:      Rate and Rhythm: Normal rate and regular rhythm.   Pulmonary:      Effort: Pulmonary effort is normal.   Abdominal:      General: There is no distension.      Palpations: Abdomen is soft.   Genitourinary:     Vagina: Normal.   Skin:     General: Skin is warm.   Neurological:      Mental Status: She is alert and oriented to person, place, and time.   Psychiatric:         Behavior: Behavior normal.         Thought Content: Thought content normal.         Judgment: Judgment normal.           ASSESSMENT:       ICD-10-CM ICD-9-CM    1. Status post hysteroscopy  Z98.890 V45.89           No orders of the defined types were placed in this encounter.          Plan:      - reviewed pathology and findings  - benign  - f/u in 1 year for annual    Noa Farias M.D.  Obstetrics  and Gynecology

## 2023-12-20 ENCOUNTER — OFFICE VISIT (OUTPATIENT)
Dept: ORTHOPEDICS | Facility: CLINIC | Age: 58
End: 2023-12-20
Payer: MEDICAID

## 2023-12-20 VITALS — HEIGHT: 67 IN | RESPIRATION RATE: 18 BRPM | BODY MASS INDEX: 45.99 KG/M2 | WEIGHT: 293 LBS

## 2023-12-20 DIAGNOSIS — M17.10 ARTHRITIS OF KNEE: ICD-10-CM

## 2023-12-20 DIAGNOSIS — M25.561 PAIN IN BOTH KNEES, UNSPECIFIED CHRONICITY: Primary | ICD-10-CM

## 2023-12-20 DIAGNOSIS — M25.562 PAIN IN BOTH KNEES, UNSPECIFIED CHRONICITY: Primary | ICD-10-CM

## 2023-12-20 PROCEDURE — 99999PBSHW PR PBB SHADOW TECHNICAL ONLY FILED TO HB: Mod: PBBFAC,,,

## 2023-12-20 PROCEDURE — 99999 PR PBB SHADOW E&M-EST. PATIENT-LVL III: ICD-10-PCS | Mod: PBBFAC,,, | Performed by: ORTHOPAEDIC SURGERY

## 2023-12-20 PROCEDURE — 4010F ACE/ARB THERAPY RXD/TAKEN: CPT | Mod: CPTII,,, | Performed by: ORTHOPAEDIC SURGERY

## 2023-12-20 PROCEDURE — 1159F MED LIST DOCD IN RCRD: CPT | Mod: CPTII,,, | Performed by: ORTHOPAEDIC SURGERY

## 2023-12-20 PROCEDURE — 99999PBSHW PR PBB SHADOW TECHNICAL ONLY FILED TO HB: ICD-10-PCS | Mod: PBBFAC,,,

## 2023-12-20 PROCEDURE — 1159F PR MEDICATION LIST DOCUMENTED IN MEDICAL RECORD: ICD-10-PCS | Mod: CPTII,,, | Performed by: ORTHOPAEDIC SURGERY

## 2023-12-20 PROCEDURE — 20610 LARGE JOINT ASPIRATION/INJECTION: BILATERAL KNEE: ICD-10-PCS | Mod: 50,S$PBB,, | Performed by: ORTHOPAEDIC SURGERY

## 2023-12-20 PROCEDURE — 4010F PR ACE/ARB THEARPY RXD/TAKEN: ICD-10-PCS | Mod: CPTII,,, | Performed by: ORTHOPAEDIC SURGERY

## 2023-12-20 PROCEDURE — 99213 OFFICE O/P EST LOW 20 MIN: CPT | Mod: PBBFAC,PO,25 | Performed by: ORTHOPAEDIC SURGERY

## 2023-12-20 PROCEDURE — 99999 PR PBB SHADOW E&M-EST. PATIENT-LVL III: CPT | Mod: PBBFAC,,, | Performed by: ORTHOPAEDIC SURGERY

## 2023-12-20 PROCEDURE — 99213 PR OFFICE/OUTPT VISIT, EST, LEVL III, 20-29 MIN: ICD-10-PCS | Mod: 25,S$PBB,, | Performed by: ORTHOPAEDIC SURGERY

## 2023-12-20 PROCEDURE — 3044F PR MOST RECENT HEMOGLOBIN A1C LEVEL <7.0%: ICD-10-PCS | Mod: CPTII,,, | Performed by: ORTHOPAEDIC SURGERY

## 2023-12-20 PROCEDURE — 99213 OFFICE O/P EST LOW 20 MIN: CPT | Mod: 25,S$PBB,, | Performed by: ORTHOPAEDIC SURGERY

## 2023-12-20 PROCEDURE — 3008F PR BODY MASS INDEX (BMI) DOCUMENTED: ICD-10-PCS | Mod: CPTII,,, | Performed by: ORTHOPAEDIC SURGERY

## 2023-12-20 PROCEDURE — 3044F HG A1C LEVEL LT 7.0%: CPT | Mod: CPTII,,, | Performed by: ORTHOPAEDIC SURGERY

## 2023-12-20 PROCEDURE — 20610 DRAIN/INJ JOINT/BURSA W/O US: CPT | Mod: 50,PBBFAC,PO | Performed by: ORTHOPAEDIC SURGERY

## 2023-12-20 PROCEDURE — 3008F BODY MASS INDEX DOCD: CPT | Mod: CPTII,,, | Performed by: ORTHOPAEDIC SURGERY

## 2023-12-20 RX ORDER — TRIAMCINOLONE ACETONIDE 40 MG/ML
40 INJECTION, SUSPENSION INTRA-ARTICULAR; INTRAMUSCULAR
Status: DISCONTINUED | OUTPATIENT
Start: 2023-12-20 | End: 2023-12-20 | Stop reason: HOSPADM

## 2023-12-20 RX ADMIN — TRIAMCINOLONE ACETONIDE 40 MG: 40 INJECTION, SUSPENSION INTRA-ARTICULAR; INTRAMUSCULAR at 01:12

## 2023-12-20 NOTE — PROGRESS NOTES
Past Medical History:   Diagnosis Date    Arthritis     Bilateral Knee Arthritis    Chronic bilateral low back pain with left-sided sciatica 02/10/2021    Essential tremor 02/10/2021    Heart disease     Hypertension     Obesity        Past Surgical History:   Procedure Laterality Date    CARPAL TUNNEL RELEASE Right 05/09/2023    Procedure: Right carpal tunnel release;  Surgeon: Kash Sahni MD;  Location: [facility] OR;  Service: Orthopedics;  Laterality: Right;    CARPAL TUNNEL RELEASE Left 08/03/2023    Procedure: Left carpal tunnel release;  Surgeon: Kash Sahni MD;  Location: [facility] OR;  Service: Orthopedics;  Laterality: Left;    EPIDURAL STEROID INJECTION INTO CERVICAL SPINE N/A 06/07/2023    Procedure: Injection-steroid-epidural-cervical C7-T1;  Surgeon: Aileen Ocampo DO;  Location: [facility] PAIN MANAGEMENT;  Service: Pain Management;  Laterality: N/A;    ESOPHAGEAL DILATION N/A 05/20/2022    Procedure: DILATION, ESOPHAGUS;  Surgeon: Jose Aguilera MD;  Location: [facility];  Service: Endoscopy;  Laterality: N/A;  [place]    ESOPHAGOGASTRODUODENOSCOPY N/A 05/20/2022    Procedure: EGD (ESOPHAGOGASTRODUODENOSCOPY);  Surgeon: Jose Aguilera MD;  Location: [facility];  Service: Endoscopy;  Laterality: N/A;    HYSTEROSCOPY WITH DILATION AND CURETTAGE OF UTERUS N/A 10/20/2023    Procedure: HYSTEROSCOPY, WITH DILATION AND CURETTAGE OF UTERUS;  Surgeon: Noa Farias MD;  Location: [facility];  Service: OB/GYN;  Laterality: N/A;    REMOVAL-MASS      of thyroid    TUBAL LIGATION         Current Outpatient Medications   Medication Sig    chlorthalidone (HYGROTEN) 25 MG Tab Take 1 tablet (25 mg total) by mouth once daily.    ergocalciferol (ERGOCALCIFEROL) 50,000 unit Cap Take 50,000 Units by mouth as needed.    famotidine (PEPCID) 20 MG tablet Take 20 mg by mouth 2 (two) times daily.    ibuprofen (ADVIL,MOTRIN) 200 MG tablet Take 600 mg by mouth every 8 (eight) hours as needed for Pain.    ibuprofen  (ADVIL,MOTRIN) 800 MG tablet Take 1 tablet (800 mg total) by mouth every 6 (six) hours as needed for Pain.    potassium chloride SA (K-DUR,KLOR-CON) 20 MEQ tablet potassium chloride ER 20 mEq tablet,extended release(part/cryst)   TAKE ONE TABLET BY MOUTH ONCE DAILY    pregabalin (LYRICA) 50 MG capsule Take 1 capsule (50 mg total) by mouth 3 (three) times daily.     Current Facility-Administered Medications   Medication    famotidine tablet 20 mg     Facility-Administered Medications Ordered in Other Visits   Medication    dextrose 50% injection 12.5 g    dextrose 50% injection 25 g    insulin regular injection 0-8 Units    lactated ringers infusion    LIDOcaine (PF) 10 mg/ml (1%) injection 10 mg       Review of patient's allergies indicates:  No Known Allergies    Family History   Problem Relation Age of Onset    Thyroid cancer Mother     Cancer Mother         breast    Dementia Mother     Heart disease Father     Tremor Father     Pacemaker/defibrilator Father     Thyroid cancer Sister     Ovarian cancer Maternal Aunt         20's    Cancer Maternal Grandmother     Colon cancer Neg Hx        Social History     Socioeconomic History    Marital status:    Tobacco Use    Smoking status: Never    Smokeless tobacco: Never   Substance and Sexual Activity    Alcohol use: Never    Drug use: Never    Sexual activity: Yes     Partners: Male     Birth control/protection: None       Chief Complaint:   No chief complaint on file.      History of present illness:  This is a 58-year-old female seen for chronic bilateral knee pain.  Left greater than right.  Patient has had pain for 3-4 years now.  Patient has been taking ibuprofen as well as getting periodic injections.  Patient just recently switched doctors.  She has had both cortisone as well as viscosupplementation.  Viscosupplementation does not help.  Pain is a 9/10.  We tried Zilretta and it did not really help much either.      Review of Systems:  Musculoskeletal:   See HPI        Physical Examination:    Vital Signs:    There were no vitals filed for this visit.      There is no height or weight on file to calculate BMI.    This a well-developed, well nourished patient in no acute distress.  They are alert and oriented and cooperative to examination.  Pt. walks without an antalgic gait.      Examination of bilateral knees shows no rashes or erythema. There are no masses ecchymosis or effusion. Patient has full range of motion from 0-130°. Patient is nontender to palpation over lateral joint line and moderately tender to palpation over the medial joint line. Knee is stable to varus and valgus stress. 5 out of 5 motor strength. Palpable distal pulses. Intact light touch sensation. Negative Patellofemoral crepitus      X-rays:  X-rays of both knees are reviewed which show severe medial joint space narrowing bilaterally     Assessment::  Severe bilateral varus knee arthritis    Plan:  I agreed to inject both knees with cortisone today.  Follow up as needed    This note was created using [employer] voice recognition software that occasionally misinterpreted phrases or words.    Consult note is delivered via Epic messaging service.

## 2023-12-20 NOTE — PROCEDURES
Large Joint Aspiration/Injection: bilateral knee    Date/Time: 12/20/2023 1:45 PM    Performed by: Brian Austin MD  Authorized by: Brian Austin MD    Consent Done?:  Yes (Verbal)  Indications:  Pain  Site marked: the procedure site was marked    Timeout: prior to procedure the correct patient, procedure, and site was verified    Prep: patient was prepped and draped in usual sterile fashion      Local anesthesia used?: Yes    Anesthesia:  Local infiltration  Local anesthetic: Ropivicaine.  Anesthetic total (ml):  3      Details:  Needle Size:  22 G  Ultrasonic Guidance for needle placement?: No    Approach:  Anterolateral  Location:  Knee  Laterality:  Bilateral  Site:  Bilateral knee  Medications (Right):  40 mg triamcinolone acetonide 40 mg/mL  Medications (Left):  40 mg triamcinolone acetonide 40 mg/mL  Patient tolerance:  Patient tolerated the procedure well with no immediate complications

## 2024-01-11 ENCOUNTER — PATIENT MESSAGE (OUTPATIENT)
Dept: OBSTETRICS AND GYNECOLOGY | Facility: CLINIC | Age: 59
End: 2024-01-11
Payer: MEDICAID

## 2024-01-11 DIAGNOSIS — R82.90 FOUL SMELLING URINE: Primary | ICD-10-CM

## 2024-01-12 ENCOUNTER — LAB VISIT (OUTPATIENT)
Dept: LAB | Facility: HOSPITAL | Age: 59
End: 2024-01-12
Attending: FAMILY MEDICINE
Payer: MEDICAID

## 2024-01-12 ENCOUNTER — PATIENT MESSAGE (OUTPATIENT)
Dept: OBSTETRICS AND GYNECOLOGY | Facility: CLINIC | Age: 59
End: 2024-01-12
Payer: MEDICAID

## 2024-01-12 DIAGNOSIS — R82.90 FOUL SMELLING URINE: ICD-10-CM

## 2024-01-12 PROCEDURE — 87086 URINE CULTURE/COLONY COUNT: CPT | Performed by: STUDENT IN AN ORGANIZED HEALTH CARE EDUCATION/TRAINING PROGRAM

## 2024-01-13 LAB
BACTERIA UR CULT: NORMAL
BACTERIA UR CULT: NORMAL

## 2024-01-17 ENCOUNTER — CLINICAL SUPPORT (OUTPATIENT)
Dept: OBSTETRICS AND GYNECOLOGY | Facility: CLINIC | Age: 59
End: 2024-01-17
Payer: COMMERCIAL

## 2024-01-17 ENCOUNTER — PATIENT MESSAGE (OUTPATIENT)
Dept: OBSTETRICS AND GYNECOLOGY | Facility: CLINIC | Age: 59
End: 2024-01-17

## 2024-01-17 DIAGNOSIS — R39.9 UTI SYMPTOMS: Primary | ICD-10-CM

## 2024-01-17 LAB
BILIRUBIN, UA POC OHS: NEGATIVE
BLOOD, UA POC OHS: NEGATIVE
CLARITY, UA POC OHS: CLEAR
COLOR, UA POC OHS: YELLOW
GLUCOSE, UA POC OHS: NEGATIVE
KETONES, UA POC OHS: 15
LEUKOCYTES, UA POC OHS: NEGATIVE
NITRITE, UA POC OHS: NEGATIVE
PH, UA POC OHS: 6
PROTEIN, UA POC OHS: NEGATIVE
SPECIFIC GRAVITY, UA POC OHS: 1.01
UROBILINOGEN, UA POC OHS: 0.2

## 2024-01-17 PROCEDURE — 81003 URINALYSIS AUTO W/O SCOPE: CPT | Mod: PBBFAC,PN

## 2024-01-17 PROCEDURE — 87086 URINE CULTURE/COLONY COUNT: CPT | Performed by: STUDENT IN AN ORGANIZED HEALTH CARE EDUCATION/TRAINING PROGRAM

## 2024-01-17 PROCEDURE — 99999PBSHW POCT URINALYSIS(INSTRUMENT): Mod: PBBFAC,,,

## 2024-01-18 ENCOUNTER — PATIENT MESSAGE (OUTPATIENT)
Dept: OBSTETRICS AND GYNECOLOGY | Facility: CLINIC | Age: 59
End: 2024-01-18
Payer: MEDICAID

## 2024-01-18 LAB
BACTERIA UR CULT: NORMAL
BACTERIA UR CULT: NORMAL

## 2024-02-22 ENCOUNTER — PATIENT MESSAGE (OUTPATIENT)
Dept: ORTHOPEDICS | Facility: CLINIC | Age: 59
End: 2024-02-22
Payer: MEDICAID

## 2024-03-19 ENCOUNTER — PATIENT MESSAGE (OUTPATIENT)
Dept: ORTHOPEDICS | Facility: CLINIC | Age: 59
End: 2024-03-19
Payer: MEDICAID

## 2024-03-21 DIAGNOSIS — M25.572 LEFT ANKLE PAIN, UNSPECIFIED CHRONICITY: Primary | ICD-10-CM

## 2024-03-26 ENCOUNTER — OFFICE VISIT (OUTPATIENT)
Dept: ORTHOPEDICS | Facility: CLINIC | Age: 59
End: 2024-03-26
Payer: COMMERCIAL

## 2024-03-26 ENCOUNTER — HOSPITAL ENCOUNTER (OUTPATIENT)
Dept: RADIOLOGY | Facility: HOSPITAL | Age: 59
Discharge: HOME | End: 2024-03-26
Attending: ORTHOPAEDIC SURGERY
Payer: MEDICAID

## 2024-03-26 DIAGNOSIS — M62.89 TIGHTNESS OF LEFT GASTROCNEMIUS MUSCLE: Primary | ICD-10-CM

## 2024-03-26 DIAGNOSIS — M72.2 PLANTAR FASCIITIS OF LEFT FOOT: ICD-10-CM

## 2024-03-26 DIAGNOSIS — M25.572 LEFT ANKLE PAIN, UNSPECIFIED CHRONICITY: ICD-10-CM

## 2024-03-26 PROCEDURE — 99999 PR PBB SHADOW E&M-EST. PATIENT-LVL II: CPT | Mod: PBBFAC,,, | Performed by: ORTHOPAEDIC SURGERY

## 2024-03-26 PROCEDURE — 73630 X-RAY EXAM OF FOOT: CPT | Mod: TC,PO,LT

## 2024-03-26 PROCEDURE — 73630 X-RAY EXAM OF FOOT: CPT | Mod: 26,LT,, | Performed by: RADIOLOGY

## 2024-03-26 PROCEDURE — 73610 X-RAY EXAM OF ANKLE: CPT | Mod: 26,LT,, | Performed by: RADIOLOGY

## 2024-03-26 PROCEDURE — 73610 X-RAY EXAM OF ANKLE: CPT | Mod: TC,PO,LT

## 2024-03-26 NOTE — PROGRESS NOTES
Status/Diagnosis: Left gastroc contracture and plantar fasciitis; Asymptomatic PCFD.  Date of Surgery: none  Date of Injury: none  Return visit: PRN  X-rays on Return: pending patient complaint    Chief Complaint:   Chief Complaint   Patient presents with    Left Foot - Pain     Present History:  Patient presents today via referral from [facility]   Mignon Hardy is a 58 y.o. female with a one-week history acute onset left plantar heel pain.  Denies any history of injury or other inciting event.  Localizes pain to the medial calcaneal tuberosity.  No previous evaluation or treatment.  Unable to take oral NSAIDs due to history of gastric sleeve surgery.  No boot/brace immobilization, physical therapy, surgical intervention.  Distant history of plantar heel corticosteroid injections.  Denies any numbness or tingling.    Past medical history significant for obesity, hypertension, and gastric sleeve surgery.  On questioning, patient with baseline left >> right lower extremity edema.  Has had DVT in the past to rule out ultrasound which was found to be negative.      Past Medical History:   Diagnosis Date    Arthritis     Bilateral Knee Arthritis    Chronic bilateral low back pain with left-sided sciatica 02/10/2021    Essential tremor 02/10/2021    Heart disease     Hypertension     Obesity        Past Surgical History:   Procedure Laterality Date    CARPAL TUNNEL RELEASE Right 05/09/2023    Procedure: Right carpal tunnel release;  Surgeon: Kash Sahni MD;  Location: [facility] OR;  Service: Orthopedics;  Laterality: Right;    CARPAL TUNNEL RELEASE Left 08/03/2023    Procedure: Left carpal tunnel release;  Surgeon: Kash Sahni MD;  Location: [facility] OR;  Service: Orthopedics;  Laterality: Left;    EPIDURAL STEROID INJECTION INTO CERVICAL SPINE N/A 06/07/2023    Procedure: Injection-steroid-epidural-cervical C7-T1;  Surgeon: Aileen Ocampo DO;  Location: [facility] PAIN MANAGEMENT;  Service: Pain  Management;  Laterality: N/A;    ESOPHAGEAL DILATION N/A 05/20/2022    Procedure: DILATION, ESOPHAGUS;  Surgeon: Jose Aguilera MD;  Location: [facility] ENDO;  Service: Endoscopy;  Laterality: N/A;  [place]    ESOPHAGOGASTRODUODENOSCOPY N/A 05/20/2022    Procedure: EGD (ESOPHAGOGASTRODUODENOSCOPY);  Surgeon: Jose Aguilera MD;  Location: [facility] ENDO;  Service: Endoscopy;  Laterality: N/A;    HYSTEROSCOPY WITH DILATION AND CURETTAGE OF UTERUS N/A 10/20/2023    Procedure: HYSTEROSCOPY, WITH DILATION AND CURETTAGE OF UTERUS;  Surgeon: Noa Farias MD;  Location: [facility] OR;  Service: OB/GYN;  Laterality: N/A;    REMOVAL-MASS      of thyroid    TUBAL LIGATION         Current Outpatient Medications   Medication Sig    chlorthalidone (HYGROTEN) 25 MG Tab Take 1 tablet (25 mg total) by mouth once daily.    ergocalciferol (ERGOCALCIFEROL) 50,000 unit Cap Take 50,000 Units by mouth as needed.    famotidine (PEPCID) 20 MG tablet Take 20 mg by mouth 2 (two) times daily.    ibuprofen (ADVIL,MOTRIN) 200 MG tablet Take 600 mg by mouth every 8 (eight) hours as needed for Pain.    ibuprofen (ADVIL,MOTRIN) 800 MG tablet Take 1 tablet (800 mg total) by mouth every 6 (six) hours as needed for Pain.    potassium chloride SA (K-DUR,KLOR-CON) 20 MEQ tablet potassium chloride ER 20 mEq tablet,extended release(part/cryst)   TAKE ONE TABLET BY MOUTH ONCE DAILY    pregabalin (LYRICA) 50 MG capsule Take 1 capsule (50 mg total) by mouth 3 (three) times daily.     Current Facility-Administered Medications   Medication    famotidine tablet 20 mg     Facility-Administered Medications Ordered in Other Visits   Medication    dextrose 50% injection 12.5 g    dextrose 50% injection 25 g    insulin regular injection 0-8 Units    lactated ringers infusion    LIDOcaine (PF) 10 mg/ml (1%) injection 10 mg       Review of patient's allergies indicates:  No Known Allergies    Family History   Problem Relation Age of Onset    Thyroid cancer Mother      Cancer Mother         breast    Dementia Mother     Heart disease Father     Tremor Father     Pacemaker/defibrilator Father     Thyroid cancer Sister     Ovarian cancer Maternal Aunt         20's    Cancer Maternal Grandmother     Colon cancer Neg Hx        Social History     Socioeconomic History    Marital status:    Tobacco Use    Smoking status: Never    Smokeless tobacco: Never   Substance and Sexual Activity    Alcohol use: Never    Drug use: Never    Sexual activity: Yes     Partners: Male     Birth control/protection: None     Social Determinants of Health     Financial Resource Strain: High Risk (3/25/2024)    Overall Financial Resource Strain (CARDIA)     Difficulty of Paying Living Expenses: Hard   Food Insecurity: Food Insecurity Present (3/25/2024)    Hunger Vital Sign     Worried About Running Out of Food in the Last Year: Often true     Ran Out of Food in the Last Year: Often true   Transportation Needs: No Transportation Needs (3/25/2024)    PRAPARE - Transportation     Lack of Transportation (Medical): No     Lack of Transportation (Non-Medical): No   Physical Activity: Insufficiently Active (3/25/2024)    Exercise Vital Sign     Days of Exercise per Week: 1 day     Minutes of Exercise per Session: 10 min   Stress: Stress Concern Present (3/25/2024)    [employer] - Occupational Stress Questionnaire     Feeling of Stress : To some extent   Social Connections: Unknown (3/25/2024)    Social Connection and Isolation Panel [NHANES]     Frequency of Communication with Friends and Family: Once a week     Frequency of Social Gatherings with Friends and Family: Never     Active Member of Clubs or Organizations: No     Attends Club or Organization Meetings: Never     Marital Status:    Housing Stability: High Risk (3/25/2024)    Housing Stability Vital Sign     Unable to Pay for Housing in the Last Year: Yes     Number of Places Lived in the Last Year: 1      Unstable Housing in the Last Year: No       Physical exam:  There were no vitals filed for this visit.  There is no height or weight on file to calculate BMI.  General: In no apparent distress; well developed and well nourished.  HEENT: normocephalic; atraumatic.  Cardiovascular: regular rate.  Respiratory: no increased work of breathing.  Musculoskeletal:   Gait: mild antalgic  Inspection:   Patient with significant bilateral flatfoot deformity with associated hindfoot valgus.  Positive forefoot abduction.  Semi rigid hindfoot on attempted correction.  Unable to perform double or single limb heel rise today.    Patient localizes pain along the medial calcaneal tuberosity with moderate tenderness on deep palpation.  No significant tenderness at the sinus tarsi or along the course of the posterior tibial tendon.    As noted in HPI, patient with significant Left >> Right lower extremity edema.  No warmth or erythema to suggest infection.  Silf[place]old: Positive  Alignment:  Knee: neutral               Ankle: neutral              Hindfoot: valgus              Forefoot:  Abduction  Strength:              Dorsiflexion 5/5  Plantar flexion 5/5  Inversion 4/5  Eversion 5/5  Sensation:              SILT distally  ROM:              Ankle: near full with pain at extremes              Subtalar: near full with pain at extremes  Pulses: Palpable pedal pulse                   Imaging Studies/Outside documentation:  I have ordered/reviewed/interpreted the following images/outside documentation:  1. Weight-bearing 3-views of Left foot and ankle:   On my independent review, no acute bony abnormality noted.  Significant flatfoot deformity with moderate decreased calcaneal pitch.  30-40% talonavicular uncoverage.  There is moderate degenerative joint changes involving the subtalar, talonavicular, and less so calcaneocuboid joints.  Os perineum present.  Ankle mortise remains congruent.  Significant hindfoot valgus best seen on AP and  mortise ankle views.  Large enthesophyte at the plantar fascial origin.        Assessment:  Mignon Hardy is a 58 y.o. female with Left gastroc contracture and plantar fasciitis; Asymptomatic PCFD.     Plan:   Clinical and radiographic findings were discussed.  Recommend conservative management to include a short course of boot immobilization.  Patient unable to take oral NSAIDs as noted in HPI.  Discussed over-the-counter topical Voltaren gel use.    We will initiate physical therapy per protocol in 1-2 weeks' time.  Patient voiced understanding.  All questions were answered.  She was symptoms persist or worsen, consider MRI left hindfoot without contrast for further evaluation.    We performed a custom orthotic/brace fitting, adjusting and training with the patient. The patient demonstrated understanding and proper care. This was performed for 15 minutes.      This note was created using voice recognition software and may contain grammatical errors.

## 2024-03-27 ENCOUNTER — OFFICE VISIT (OUTPATIENT)
Dept: ORTHOPEDICS | Facility: CLINIC | Age: 59
End: 2024-03-27
Payer: MEDICAID

## 2024-03-27 VITALS — HEIGHT: 67 IN | BODY MASS INDEX: 45.99 KG/M2 | WEIGHT: 293 LBS

## 2024-03-27 DIAGNOSIS — M17.10 ARTHRITIS OF KNEE: Primary | ICD-10-CM

## 2024-03-27 PROCEDURE — 99999PBSHW PR PBB SHADOW TECHNICAL ONLY FILED TO HB: Mod: PBBFAC,,,

## 2024-03-27 PROCEDURE — 20610 DRAIN/INJ JOINT/BURSA W/O US: CPT | Mod: 50,PBBFAC,PO | Performed by: ORTHOPAEDIC SURGERY

## 2024-03-27 PROCEDURE — 99212 OFFICE O/P EST SF 10 MIN: CPT | Mod: PBBFAC,PO | Performed by: ORTHOPAEDIC SURGERY

## 2024-03-27 PROCEDURE — 99499 UNLISTED E&M SERVICE: CPT | Mod: 25,S$PBB,, | Performed by: ORTHOPAEDIC SURGERY

## 2024-03-27 PROCEDURE — 99999 PR PBB SHADOW E&M-EST. PATIENT-LVL II: CPT | Mod: PBBFAC,,, | Performed by: ORTHOPAEDIC SURGERY

## 2024-03-27 RX ORDER — TRIAMCINOLONE ACETONIDE 40 MG/ML
40 INJECTION, SUSPENSION INTRA-ARTICULAR; INTRAMUSCULAR
Status: DISCONTINUED | OUTPATIENT
Start: 2024-03-27 | End: 2024-03-27 | Stop reason: HOSPADM

## 2024-03-27 RX ADMIN — TRIAMCINOLONE ACETONIDE 40 MG: 40 INJECTION, SUSPENSION INTRA-ARTICULAR; INTRAMUSCULAR at 10:03

## 2024-03-27 NOTE — PROCEDURES
Large Joint Aspiration/Injection: bilateral knee    Date/Time: 3/27/2024 10:30 AM    Performed by: Brian Austin MD  Authorized by: Brian Austin MD    Consent Done?:  Yes (Verbal)  Indications:  Pain  Site marked: the procedure site was marked    Timeout: prior to procedure the correct patient, procedure, and site was verified    Prep: patient was prepped and draped in usual sterile fashion      Local anesthesia used?: Yes    Anesthesia:  Local infiltration  Local anesthetic: Ropivicaine.  Anesthetic total (ml):  3      Details:  Needle Size:  22 G  Ultrasonic Guidance for needle placement?: No    Approach:  Anterolateral  Location:  Knee  Laterality:  Bilateral  Site:  Bilateral knee  Medications (Right):  40 mg triamcinolone acetonide 40 mg/mL  Medications (Left):  40 mg triamcinolone acetonide 40 mg/mL  Patient tolerance:  Patient tolerated the procedure well with no immediate complications

## 2024-03-27 NOTE — PROGRESS NOTES
Past Medical History:   Diagnosis Date    Arthritis     Bilateral Knee Arthritis    Chronic bilateral low back pain with left-sided sciatica 02/10/2021    Essential tremor 02/10/2021    Heart disease     Hypertension     Obesity        Past Surgical History:   Procedure Laterality Date    CARPAL TUNNEL RELEASE Right 05/09/2023    Procedure: Right carpal tunnel release;  Surgeon: Kash Sahni MD;  Location: [facility] OR;  Service: Orthopedics;  Laterality: Right;    CARPAL TUNNEL RELEASE Left 08/03/2023    Procedure: Left carpal tunnel release;  Surgeon: Kash Sahni MD;  Location: [facility] OR;  Service: Orthopedics;  Laterality: Left;    EPIDURAL STEROID INJECTION INTO CERVICAL SPINE N/A 06/07/2023    Procedure: Injection-steroid-epidural-cervical C7-T1;  Surgeon: Aileen Ocampo DO;  Location: [facility]IN [facility]EMENT;  Service: Pain Management;  Laterality: N/A;    ESOPHAGEAL DILATION N/A 05/20/2022    Procedure: DILATION, ESOPHAGUS;  Surgeon: Jose Aguilera MD;  Location: [facility];  Service: Endoscopy;  Laterality: N/A;  [place]    ESOPHAGOGASTRODUODENOSCOPY N/A 05/20/2022    Procedure: EGD (ESOPHAGOGASTRODUODENOSCOPY);  Surgeon: Jose Aguilera MD;  Location: [facility];  Service: Endoscopy;  Laterality: N/A;    HYSTEROSCOPY WITH DILATION AND CURETTAGE OF UTERUS N/A 10/20/2023    Procedure: HYSTEROSCOPY, WITH DILATION AND CURETTAGE OF UTERUS;  Surgeon: Noa Farias MD;  Location: [facility];  Service: OB/GYN;  Laterality: N/A;    REMOVAL-MASS      of thyroid    TUBAL LIGATION         Current Outpatient Medications   Medication Sig    ergocalciferol (ERGOCALCIFEROL) 50,000 unit Cap Take 50,000 Units by mouth as needed.    famotidine (PEPCID) 20 MG tablet Take 20 mg by mouth 2 (two) times daily.    potassium chloride SA (K-DUR,KLOR-CON) 20 MEQ tablet potassium chloride ER 20 mEq tablet,extended release(part/cryst)   TAKE ONE TABLET BY MOUTH ONCE DAILY     Current Facility-Administered Medications    Medication    famotidine tablet 20 mg     Facility-Administered Medications Ordered in Other Visits   Medication    dextrose 50% injection 12.5 g    dextrose 50% injection 25 g    insulin regular injection 0-8 Units    lactated ringers infusion    LIDOcaine (PF) 10 mg/ml (1%) injection 10 mg       Review of patient's allergies indicates:  No Known Allergies    Family History   Problem Relation Age of Onset    Thyroid cancer Mother     Cancer Mother         breast    Dementia Mother     Heart disease Father     Tremor Father     Pacemaker/defibrilator Father     Thyroid cancer Sister     Ovarian cancer Maternal Aunt         20's    Cancer Maternal Grandmother     Colon cancer Neg Hx        Social History     Socioeconomic History    Marital status:    Tobacco Use    Smoking status: Never    Smokeless tobacco: Never   Substance and Sexual Activity    Alcohol use: Never    Drug use: Never    Sexual activity: Yes     Partners: Male     Birth control/protection: None     Social Determinants of Health     Financial Resource Strain: High Risk (3/25/2024)    Overall Financial Resource Strain (CARDIA)     Difficulty of Paying Living Expenses: Hard   Food Insecurity: Food Insecurity Present (3/25/2024)    Hunger Vital Sign     Worried About Running Out of Food in the Last Year: Often true     Ran Out of Food in the Last Year: Often true   Transportation Needs: No Transportation Needs (3/25/2024)    PRAPARE - Transportation     Lack of Transportation (Medical): No     Lack of Transportation (Non-Medical): No   Physical Activity: Insufficiently Active (3/25/2024)    Exercise Vital Sign     Days of Exercise per Week: 1 day     Minutes of Exercise per Session: 10 min   Stress: Stress Concern Present (3/25/2024)    [employer] [place] [employer] - Occupational Stress Questionnaire     Feeling of Stress : To some extent   Social Connections: Unknown (3/25/2024)    Social Connection and Isolation Panel [NHANES]      Frequency of Communication with Friends and Family: Once a week     Frequency of Social Gatherings with Friends and Family: Never     Active Member of Clubs or Organizations: No     Attends Club or Organization Meetings: Never     Marital Status:    Housing Stability: High Risk (3/25/2024)    Housing Stability Vital Sign     Unable to Pay for Housing in the Last Year: Yes     Number of Places Lived in the Last Year: 1     Unstable Housing in the Last Year: No       Chief Complaint:   Chief Complaint   Patient presents with    Right Knee - Pain    Left Knee - Pain       History of present illness:  This is a 58-year-old female seen for chronic bilateral knee pain.  Left greater than right.  Patient has had pain for 3-4 years now.  Patient has been taking ibuprofen as well as getting periodic injections.  Patient just recently switched doctors.  She has had both cortisone as well as viscosupplementation.  Viscosupplementation does not help.  Pain is a 9/10.  We tried Zilretta and it did not really help much either.      Review of Systems:  Musculoskeletal:  See HPI        Physical Examination:    Vital Signs:    There were no vitals filed for this visit.      Body mass index is 58.56 kg/m².    This a well-developed, well nourished patient in no acute distress.  They are alert and oriented and cooperative to examination.  Pt. walks without an antalgic gait.      Examination of bilateral knees shows no rashes or erythema. There are no masses ecchymosis or effusion. Patient has full range of motion from 0-130°. Patient is nontender to palpation over lateral joint line and moderately tender to palpation over the medial joint line. Knee is stable to varus and valgus stress. 5 out of 5 motor strength. Palpable distal pulses. Intact light touch sensation. Negative Patellofemoral crepitus      X-rays:  X-rays of both knees are reviewed which show severe medial joint space narrowing bilaterally     Assessment::  Severe  bilateral varus knee arthritis    Plan:  I agreed to inject both knees with cortisone today.  Follow up as needed    This note was created using [employer] voice recognition software that occasionally misinterpreted phrases or words.    Consult note is delivered via Epic messaging service.

## 2024-05-22 ENCOUNTER — PATIENT MESSAGE (OUTPATIENT)
Dept: ORTHOPEDICS | Facility: CLINIC | Age: 59
End: 2024-05-22
Payer: MEDICAID

## 2024-06-04 ENCOUNTER — PATIENT MESSAGE (OUTPATIENT)
Dept: PAIN MEDICINE | Facility: CLINIC | Age: 59
End: 2024-06-04
Payer: MEDICAID

## 2024-06-05 ENCOUNTER — OFFICE VISIT (OUTPATIENT)
Dept: ORTHOPEDICS | Facility: CLINIC | Age: 59
End: 2024-06-05
Payer: MEDICAID

## 2024-06-05 VITALS — BODY MASS INDEX: 45.99 KG/M2 | WEIGHT: 293 LBS | HEIGHT: 67 IN

## 2024-06-05 DIAGNOSIS — G56.01 CARPAL TUNNEL SYNDROME OF RIGHT WRIST: Primary | ICD-10-CM

## 2024-06-05 PROCEDURE — 99213 OFFICE O/P EST LOW 20 MIN: CPT | Mod: S$PBB,25,, | Performed by: ORTHOPAEDIC SURGERY

## 2024-06-05 PROCEDURE — 20526 THER INJECTION CARP TUNNEL: CPT | Mod: PBBFAC,PO,RT | Performed by: ORTHOPAEDIC SURGERY

## 2024-06-05 PROCEDURE — 3008F BODY MASS INDEX DOCD: CPT | Mod: CPTII,,, | Performed by: ORTHOPAEDIC SURGERY

## 2024-06-05 PROCEDURE — 1159F MED LIST DOCD IN RCRD: CPT | Mod: CPTII,,, | Performed by: ORTHOPAEDIC SURGERY

## 2024-06-05 PROCEDURE — 99999 PR PBB SHADOW E&M-EST. PATIENT-LVL II: CPT | Mod: PBBFAC,,, | Performed by: ORTHOPAEDIC SURGERY

## 2024-06-05 PROCEDURE — 99212 OFFICE O/P EST SF 10 MIN: CPT | Mod: PBBFAC,PO,25 | Performed by: ORTHOPAEDIC SURGERY

## 2024-06-05 PROCEDURE — 99999PBSHW PR PBB SHADOW TECHNICAL ONLY FILED TO HB: Mod: PBBFAC,,,

## 2024-06-05 RX ORDER — TRIAMCINOLONE ACETONIDE 40 MG/ML
40 INJECTION, SUSPENSION INTRA-ARTICULAR; INTRAMUSCULAR
Status: DISCONTINUED | OUTPATIENT
Start: 2024-06-05 | End: 2024-06-05 | Stop reason: HOSPADM

## 2024-06-05 RX ADMIN — TRIAMCINOLONE ACETONIDE 40 MG: 40 INJECTION, SUSPENSION INTRA-ARTICULAR; INTRAMUSCULAR at 02:06

## 2024-06-05 NOTE — PROCEDURES
Carpal Tunnel    Date/Time: 6/5/2024 2:00 PM    Performed by: Kash Sahni MD  Authorized by: Kash Sahni MD    Consent Done?:  Yes (Verbal)  Indications:  Pain  Site marked: the procedure site was marked    Timeout: prior to procedure the correct patient, procedure, and site was verified    Prep: patient was prepped and draped in usual sterile fashion      Local anesthesia used?: Yes    Local anesthetic:  Lidocaine 1% without epinephrine  Anesthetic total (ml):  0.5    Location:  Wrist (Right carpal tunnel)  Site:  R carpal tunnel  Needle size:  25 G  Medications:  40 mg triamcinolone acetonide 40 mg/mL (20 mg injected)  Patient tolerance:  Patient tolerated the procedure well with no immediate complications

## 2024-06-05 NOTE — PROGRESS NOTES
Ms. Hardy returns to clinic today.  Has a history of bilateral carpal tunnel syndrome.  He was undergone carpal tunnel releases.  On the right side this was approximately a year ago.  She was now having recurrence of some numbness in the right hand.  She also had 1 episode of a shocking sensation with motion on her left side.  He was here today for further evaluation     Physical exam: Examination of the right hand and wrist reveals that she has well-healed incision.  There was no edema.  Palpation produces no specific tenderness.  She does have a positive Tinel's overlying the carpal tunnel proximally on the right.  She does report grossly intact sensation.  Has 5/5 thenar muscular strength there was a negative Tinel's overlying the cubital tunnel and Guyon's canal     Examination of the left hand and wrist reveals that there is no edema.  There are no skin changes.  Palpation produces no tenderness.  She has intact sensation.  Capillary refill is less than 2 seconds     Assessment:  Possible recurrent right carpal tunnel syndrome     Plan:    1.  After consent was obtained injection was placed to the region of the right carpal tunnel    2.  She will follow up with me in 2-3 months for repeat evaluation will review the response to her injection at that time and discuss possibility of further workup for her cervical spine

## 2024-06-06 ENCOUNTER — PATIENT MESSAGE (OUTPATIENT)
Dept: NEUROSURGERY | Facility: CLINIC | Age: 59
End: 2024-06-06
Payer: MEDICAID

## 2024-06-06 ENCOUNTER — TELEPHONE (OUTPATIENT)
Dept: NEUROSURGERY | Facility: CLINIC | Age: 59
End: 2024-06-06
Payer: MEDICAID

## 2024-06-06 NOTE — TELEPHONE ENCOUNTER
Patient successfully scheduled with Alexandria Rollins PA-C for Wednesday, 06/19/24 @ 11:00 am.  No current imaging in chart.  PP message sent.      ----- Message from Sesar Freeman sent at 6/6/2024  8:46 AM CDT -----  Regarding: appt  Contact: LAYLA MITCHELL [[id number]]  Type:  Sooner Appointment Request    Caller is requesting a sooner appointment.      Name of Caller:  Layla    When is the first available appointment?  Medicaid    Symptoms:  Referral    Would the patient rather a call back or a response via [facility]? Call    Best Call Back Number:  [phone] (home)     Additional Information:  Appt was cancelled in 2023. Please call to advise.

## 2024-06-19 ENCOUNTER — OFFICE VISIT (OUTPATIENT)
Dept: NEUROSURGERY | Facility: CLINIC | Age: 59
End: 2024-06-19
Payer: MEDICAID

## 2024-06-19 VITALS
WEIGHT: 293 LBS | DIASTOLIC BLOOD PRESSURE: 86 MMHG | SYSTOLIC BLOOD PRESSURE: 141 MMHG | HEART RATE: 64 BPM | BODY MASS INDEX: 45.99 KG/M2 | HEIGHT: 67 IN | RESPIRATION RATE: 18 BRPM

## 2024-06-19 DIAGNOSIS — Z98.1 HISTORY OF FUSION OF CERVICAL SPINE: ICD-10-CM

## 2024-06-19 DIAGNOSIS — M54.2 NECK PAIN: ICD-10-CM

## 2024-06-19 DIAGNOSIS — M47.22 CERVICAL SPONDYLOSIS WITH RADICULOPATHY: ICD-10-CM

## 2024-06-19 PROCEDURE — 3079F DIAST BP 80-89 MM HG: CPT | Mod: CPTII,,, | Performed by: PHYSICIAN ASSISTANT

## 2024-06-19 PROCEDURE — 1160F RVW MEDS BY RX/DR IN RCRD: CPT | Mod: CPTII,,, | Performed by: PHYSICIAN ASSISTANT

## 2024-06-19 PROCEDURE — 3077F SYST BP >= 140 MM HG: CPT | Mod: CPTII,,, | Performed by: PHYSICIAN ASSISTANT

## 2024-06-19 PROCEDURE — 99204 OFFICE O/P NEW MOD 45 MIN: CPT | Mod: S$PBB,,, | Performed by: PHYSICIAN ASSISTANT

## 2024-06-19 PROCEDURE — 1159F MED LIST DOCD IN RCRD: CPT | Mod: CPTII,,, | Performed by: PHYSICIAN ASSISTANT

## 2024-06-19 PROCEDURE — 3008F BODY MASS INDEX DOCD: CPT | Mod: CPTII,,, | Performed by: PHYSICIAN ASSISTANT

## 2024-06-19 NOTE — PROGRESS NOTES
[facility]  Clinic Consult     Consult Requested By: Nick Silvestre FNP  PCP: Wagner Rondon MD    SUBJECTIVE:     Chief Complaint:   Chief Complaint   Patient presents with    Cervical Spine Pain (C-spine)       History of Present Illness:  Mignon Hardy is a 58 y.o. female with HTN who presents for evaluation of neck and arm pain. She has been experiencing symptoms for greater than 1 year. The pain is present in the neck and radiates into the left arm. The pain is worse in the left shoulder with burning and stabbing pain. Also experiences shocking pain from elbow to wrist. She did undergo cervical KAILEE last year without improvement. She was referred for surgical consideration. She has attended PT. Previous MRI revealed cervical spondylosis with kyphosis, no significant central stenosis, fusion C5-6. She underwent fusion in 1996.     Pertinent and recent history, provider evaluations, imaging and data reviewed in EPIC        Past Medical History:   Diagnosis Date    Arthritis     Bilateral Knee Arthritis    Chronic bilateral low back pain with left-sided sciatica 02/10/2021    Essential tremor 02/10/2021    Heart disease     Hypertension     Obesity      Past Surgical History:   Procedure Laterality Date    CARPAL TUNNEL RELEASE Right 05/09/2023    Procedure: Right carpal tunnel release;  Surgeon: Kash Sahni MD;  Location: [facility] OR;  Service: Orthopedics;  Laterality: Right;    CARPAL TUNNEL RELEASE Left 08/03/2023    Procedure: Left carpal tunnel release;  Surgeon: Kash Sahni MD;  Location: [facility] OR;  Service: Orthopedics;  Laterality: Left;    EPIDURAL STEROID INJECTION INTO CERVICAL SPINE N/A 06/07/2023    Procedure: Injection-steroid-epidural-cervical C7-T1;  Surgeon: Aileen Ocampo DO;  Location: [facility];  Service: Pain Management;  Laterality: N/A;    ESOPHAGEAL DILATION N/A 05/20/2022    Procedure: DILATION, ESOPHAGUS;   Surgeon: Jose Aguilera MD;  Location: [facility];  Service: Endoscopy;  Laterality: N/A;  [place]    ESOPHAGOGASTRODUODENOSCOPY N/A 05/20/2022    Procedure: EGD (ESOPHAGOGASTRODUODENOSCOPY);  Surgeon: Jose Aguilera MD;  Location: [facility];  Service: Endoscopy;  Laterality: N/A;    HYSTEROSCOPY WITH DILATION AND CURETTAGE OF UTERUS N/A 10/20/2023    Procedure: HYSTEROSCOPY, WITH DILATION AND CURETTAGE OF UTERUS;  Surgeon: Noa Farias MD;  Location: [facility];  Service: OB/GYN;  Laterality: N/A;    REMOVAL-MASS      of thyroid    TUBAL LIGATION       Family History   Problem Relation Name Age of Onset    Thyroid cancer Mother      Cancer Mother          breast    Dementia Mother      Heart disease Father      Tremor Father      Pacemaker/defibrilator Father      Thyroid cancer Sister      Ovarian cancer Maternal Aunt          20's    Cancer Maternal Grandmother      Colon cancer Neg Hx       Social History     Tobacco Use    Smoking status: Never    Smokeless tobacco: Never   Substance Use Topics    Alcohol use: Never    Drug use: Never      Review of patient's allergies indicates:  No Known Allergies    Current Outpatient Medications:     ergocalciferol (ERGOCALCIFEROL) 50,000 unit Cap, Take 50,000 Units by mouth as needed., Disp: , Rfl:     famotidine (PEPCID) 20 MG tablet, Take 20 mg by mouth 2 (two) times daily., Disp: , Rfl:     potassium chloride SA (K-DUR,KLOR-CON) 20 MEQ tablet, potassium chloride ER 20 mEq tablet,extended release(part/cryst)  TAKE ONE TABLET BY MOUTH ONCE DAILY, Disp: , Rfl:     Current Facility-Administered Medications:     famotidine tablet 20 mg, 20 mg, Oral, On Call Procedure, Noa Farias MD    Facility-Administered Medications Ordered in Other Visits:     dextrose 50% injection 12.5 g, 12.5 g, Intravenous, PRN, Cassandra Thompson MD    dextrose 50% injection 25 g, 25 g, Intravenous, PRN, Cassandra Thompson MD    insulin regular injection 0-8 Units, 0-8 Units,  "Intravenous, PRN, Cassandra Thompson MD    lactated ringers infusion, , Intravenous, Continuous, Cassandra Thompson MD, New Bag at 10/20/23 1159    LIDOcaine (PF) 10 mg/ml (1%) injection 10 mg, 1 mL, Intradermal, Once, Cassandra Thompson MD    Review of Systems:   Constitutional: no fever, chills or night sweats. No changes in weight   Eyes: no visual changes   ENT: no nasal congestion or sore throat   Respiratory: no cough or shortness of breath   Cardiovascular: no chest pain or palpitations   Gastrointestinal: no nausea or vomiting   Genitourinary: no hematuria or dysuria   Integument/Breast: no rash or pruritis   Hematologic/Lymphatic: no easy bruising or lymphadenopathy   Musculoskeletal: +neck pain, arm pain  Neurological: no seizures or tremors +paresthesias   Behavioral/Psych: no auditory or visual hallucinations   Endocrine: no heat or cold intolerance         OBJECTIVE:     Vital Signs (Most Recent):  Pulse: 64 (06/19/24 1050)  Resp: 18 (06/19/24 1050)  BP: (!) 141/86 (06/19/24 1050)  Estimated body mass index is 58.56 kg/m² as calculated from the following:    Height as of this encounter: 5' 7" (1.702 m).    Weight as of this encounter: 169.6 kg (373 lb 14.4 oz).    Physical Exam:   General: well developed, well nourished, no distress   Neurologic: Alert and oriented. Thought content appropriate. GCS 15.   Head: normocephalic, atraumatic  Eyes: EOMI  Neck: trachea midline, no JVD   Cardiovascular: no LE edema  Pulmonary: normal respirations, no signs of respiratory distress  Abdomen: non-distended  Sensory: intact to light touch throughout  Skin: Skin is warm, dry and intact    Motor Strength: Moves all extremities spontaneously with good tone. No abnormal movements seen.       Deltoids Triceps Biceps Wrist Extension Wrist Flexion Hand  Interossei   Upper: R 5/5 5/5 5/5 5/5 5/5 5/5 5/5    L 5/5 5/5 5/5 5/5 5/5 5/5 5/5     Iliopsoas Quadriceps Knee  Flexion Tibialis  anterior Gastro- cnemius EHL  "   Lower: R 5/5 5/5 5/5 5/5 5/5 5/5     L 5/5 5/5 5/5 5/5 5/5 5/5      DTR's: 2 + and symmetric in UE and LE  Cueto: absent    Gait: normal      Cervical Spine: full ROM           Diagnostic Results:  No current imaging    ASSESSMENT/PLAN:     History of fusion of cervical spine  -     MRI Cervical Spine Without Contrast; Future; Expected date: 06/19/2024  -     X-Ray Cervical Spine AP Lat with Flexion  Extension; Future; Expected date: 06/19/2024    Neck pain  -     MRI Cervical Spine Without Contrast; Future; Expected date: 06/19/2024  -     X-Ray Cervical Spine AP Lat with Flexion  Extension; Future; Expected date: 06/19/2024    Cervical spondylosis with radiculopathy  -     MRI Cervical Spine Without Contrast; Future; Expected date: 06/19/2024  -     X-Ray Cervical Spine AP Lat with Flexion  Extension; Future; Expected date: 06/19/2024        Mignon Hardy is a 58 y.o. female with remote history of cervical fusion with cervical spondylosis with kyphosis and radiculopathy. She has failed to improve with cervical KAILEE. She will obtain updated cervical spine imaging, MRI and dynamic x-rays to further direct treatment. I will review once complete and discuss findings/recommendations via mychart     Patient verbalized understanding of plan. Encouraged to call with any questions or concerns.     This note was partially dictated using voice recognition software, so please excuse any errors that were not corrected.

## 2024-07-02 DIAGNOSIS — M25.569 KNEE PAIN, UNSPECIFIED CHRONICITY, UNSPECIFIED LATERALITY: Primary | ICD-10-CM

## 2024-07-10 ENCOUNTER — OFFICE VISIT (OUTPATIENT)
Dept: ORTHOPEDICS | Facility: CLINIC | Age: 59
End: 2024-07-10
Payer: MEDICAID

## 2024-07-10 ENCOUNTER — HOSPITAL ENCOUNTER (OUTPATIENT)
Dept: RADIOLOGY | Facility: HOSPITAL | Age: 59
Discharge: HOME | End: 2024-07-10
Attending: ORTHOPAEDIC SURGERY
Payer: MEDICAID

## 2024-07-10 VITALS — WEIGHT: 258.38 LBS | BODY MASS INDEX: 40.55 KG/M2 | HEIGHT: 67 IN

## 2024-07-10 DIAGNOSIS — M25.569 KNEE PAIN, UNSPECIFIED CHRONICITY, UNSPECIFIED LATERALITY: ICD-10-CM

## 2024-07-10 DIAGNOSIS — M17.10 ARTHRITIS OF KNEE: Primary | ICD-10-CM

## 2024-07-10 PROCEDURE — 73564 X-RAY EXAM KNEE 4 OR MORE: CPT | Mod: 26,50,, | Performed by: RADIOLOGY

## 2024-07-10 PROCEDURE — 99212 OFFICE O/P EST SF 10 MIN: CPT | Mod: PBBFAC,25,PO | Performed by: ORTHOPAEDIC SURGERY

## 2024-07-10 PROCEDURE — 3008F BODY MASS INDEX DOCD: CPT | Mod: CPTII,,, | Performed by: ORTHOPAEDIC SURGERY

## 2024-07-10 PROCEDURE — 1160F RVW MEDS BY RX/DR IN RCRD: CPT | Mod: CPTII,,, | Performed by: ORTHOPAEDIC SURGERY

## 2024-07-10 PROCEDURE — 73564 X-RAY EXAM KNEE 4 OR MORE: CPT | Mod: TC,50,PO

## 2024-07-10 PROCEDURE — 99999 PR PBB SHADOW E&M-EST. PATIENT-LVL II: CPT | Mod: PBBFAC,,, | Performed by: ORTHOPAEDIC SURGERY

## 2024-07-10 PROCEDURE — 99214 OFFICE O/P EST MOD 30 MIN: CPT | Mod: 57,S$PBB,, | Performed by: ORTHOPAEDIC SURGERY

## 2024-07-10 PROCEDURE — 1159F MED LIST DOCD IN RCRD: CPT | Mod: CPTII,,, | Performed by: ORTHOPAEDIC SURGERY

## 2024-07-10 NOTE — PROGRESS NOTES
Past Medical History:   Diagnosis Date    Arthritis     Bilateral Knee Arthritis    Chronic bilateral low back pain with left-sided sciatica 02/10/2021    Essential tremor 02/10/2021    Heart disease     Hypertension     Obesity        Past Surgical History:   Procedure Laterality Date    CARPAL TUNNEL RELEASE Right 05/09/2023    Procedure: Right carpal tunnel release;  Surgeon: Kash Sahni MD;  Location: [facility] OR;  Service: Orthopedics;  Laterality: Right;    CARPAL TUNNEL RELEASE Left 08/03/2023    Procedure: Left carpal tunnel release;  Surgeon: Kash Sahni MD;  Location: [facility] OR;  Service: Orthopedics;  Laterality: Left;    EPIDURAL STEROID INJECTION INTO CERVICAL SPINE N/A 06/07/2023    Procedure: Injection-steroid-epidural-cervical C7-T1;  Surgeon: Aileen Ocampo DO;  Location: [facility];  Service: Pain Management;  Laterality: N/A;    ESOPHAGEAL DILATION N/A 05/20/2022    Procedure: DILATION, ESOPHAGUS;  Surgeon: Jose Aguilera MD;  Location: [facility];  Service: Endoscopy;  Laterality: N/A;  [place]    ESOPHAGOGASTRODUODENOSCOPY N/A 05/20/2022    Procedure: EGD (ESOPHAGOGASTRODUODENOSCOPY);  Surgeon: Jose Aguilera MD;  Location: [facility];  Service: Endoscopy;  Laterality: N/A;    HYSTEROSCOPY WITH DILATION AND CURETTAGE OF UTERUS N/A 10/20/2023    Procedure: HYSTEROSCOPY, WITH DILATION AND CURETTAGE OF UTERUS;  Surgeon: Noa Farias MD;  Location: [facility];  Service: OB/GYN;  Laterality: N/A;    REMOVAL-MASS      of thyroid    TUBAL LIGATION         Current Outpatient Medications   Medication Sig    ergocalciferol (ERGOCALCIFEROL) 50,000 unit Cap Take 50,000 Units by mouth as needed.    famotidine (PEPCID) 20 MG tablet Take 20 mg by mouth 2 (two) times daily.    potassium chloride SA (K-DUR,KLOR-CON) 20 MEQ tablet potassium chloride ER 20 mEq tablet,extended release(part/cryst)   TAKE ONE TABLET BY MOUTH ONCE DAILY     Current Facility-Administered Medications    Medication    famotidine tablet 20 mg     Facility-Administered Medications Ordered in Other Visits   Medication    dextrose 50% injection 12.5 g    dextrose 50% injection 25 g    insulin regular injection 0-8 Units    lactated ringers infusion    LIDOcaine (PF) 10 mg/ml (1%) injection 10 mg       Review of patient's allergies indicates:  No Known Allergies    Family History   Problem Relation Name Age of Onset    Thyroid cancer Mother      Cancer Mother          breast    Dementia Mother      Heart disease Father      Tremor Father      Pacemaker/defibrilator Father      Thyroid cancer Sister      Ovarian cancer Maternal Aunt          20's    Cancer Maternal Grandmother      Colon cancer Neg Hx         Social History     Socioeconomic History    Marital status:    Tobacco Use    Smoking status: Never    Smokeless tobacco: Never   Substance and Sexual Activity    Alcohol use: Never    Drug use: Never    Sexual activity: Yes     Partners: Male     Birth control/protection: None     Social Determinants of Health     Financial Resource Strain: High Risk (3/25/2024)    Overall Financial Resource Strain (CARDIA)     Difficulty of Paying Living Expenses: Hard   Food Insecurity: Food Insecurity Present (3/25/2024)    Hunger Vital Sign     Worried About Running Out of Food in the Last Year: Often true     Ran Out of Food in the Last Year: Often true   Transportation Needs: No Transportation Needs (3/25/2024)    PRAPARE - Transportation     Lack of Transportation (Medical): No     Lack of Transportation (Non-Medical): No   Physical Activity: Insufficiently Active (3/25/2024)    Exercise Vital Sign     Days of Exercise per Week: 1 day     Minutes of Exercise per Session: 10 min   Stress: Stress Concern Present (3/25/2024)    [employer] [employer] [employer] - Occupational Stress Questionnaire     Feeling of Stress : To some extent   Housing Stability: High Risk (3/25/2024)    Housing Stability Vital Sign      Unable to Pay for Housing in the Last Year: Yes     Number of Places Lived in the Last Year: 1     Unstable Housing in the Last Year: No       Chief Complaint:   Chief Complaint   Patient presents with    Right Knee - Pain    Left Knee - Pain       History of present illness:  This is a 58-year-old female seen for chronic bilateral knee pain.  Left greater than right.  Patient has had pain for 3-4 years now.  Patient has been taking ibuprofen as well as getting periodic injections.  Patient just recently switched doctors.  She has had both cortisone as well as viscosupplementation.  Viscosupplementation does not help.  Pain is a 9/10.  We tried Zilretta and it did not really help much either.  She has lost a 115 pounds now.  Left knee starting to really affect her quality of life.      Review of Systems:  Musculoskeletal:  See HPI        Physical Examination:    Vital Signs:    There were no vitals filed for this visit.      Body mass index is 40.47 kg/m².    This a well-developed, well nourished patient in no acute distress.  They are alert and oriented and cooperative to examination.  Pt. walks without an antalgic gait.      Examination of bilateral knees shows no rashes or erythema. There are no masses ecchymosis or effusion. Patient has full range of motion from 0-130°. Patient is nontender to palpation over lateral joint line and moderately tender to palpation over the medial joint line. Knee is stable to varus and valgus stress. 5 out of 5 motor strength. Palpable distal pulses. Intact light touch sensation. Negative Patellofemoral crepitus    Heart is regular rate without obvious murmurs   Normal respiratory effort without audible wheezing  Abdomen is soft and nontender       X-rays:  X-rays of both knees are ordered a reviewed which show severe medial joint space narrowing bilaterally     Assessment::  Severe bilateral varus knee arthritis    Plan:  I reviewed the findings with her today.  Patient is ready  to proceed with left knee replacement.  Plan is for left robotic assisted total knee arthroplasty.  Risks, benefits, and alternatives to the procedure were explained to the patient including but not limited to damage to nerves, arteries, blood vessels, bones, tendons, ligaments, stiffness, instability, infection, permanent limb dysfunction, DVT, PE, as well as general anesthetic complications including seizure, stroke, heart attack and even death.  Increased risk of complication due to obesity including infection and implant failure.  The patient understood these risks and wished to proceed and signed the informed consent.       This note was created using [employer] voice recognition software that occasionally misinterpreted phrases or words.    Consult note is delivered via Epic messaging service.

## 2024-07-11 ENCOUNTER — TELEPHONE (OUTPATIENT)
Dept: FAMILY MEDICINE | Facility: CLINIC | Age: 59
End: 2024-07-11
Payer: MEDICAID

## 2024-07-11 NOTE — TELEPHONE ENCOUNTER
----- Message from Ana M Hanna sent at 7/11/2024 11:03 AM CDT -----  Type: Needs Medical Advice  Who Called:  pt  Best Call Back Number: [phone]    Additional Information: Pt is calling the office pt is trying to establish care.Please call back and advise.

## 2024-07-12 ENCOUNTER — PATIENT MESSAGE (OUTPATIENT)
Dept: ORTHOPEDICS | Facility: CLINIC | Age: 59
End: 2024-07-12
Payer: MEDICAID

## 2024-07-12 ENCOUNTER — PATIENT MESSAGE (OUTPATIENT)
Dept: NEUROSURGERY | Facility: CLINIC | Age: 59
End: 2024-07-12
Payer: MEDICAID

## 2024-07-12 DIAGNOSIS — M47.22 CERVICAL SPONDYLOSIS WITH RADICULOPATHY: Primary | ICD-10-CM

## 2024-07-12 DIAGNOSIS — M17.10 ARTHRITIS OF KNEE: Primary | ICD-10-CM

## 2024-07-18 ENCOUNTER — TELEPHONE (OUTPATIENT)
Dept: PAIN MEDICINE | Facility: CLINIC | Age: 59
End: 2024-07-18
Payer: MEDICAID

## 2024-07-18 NOTE — TELEPHONE ENCOUNTER
Physician - Dr Qureshi    Type of Procedure/Injection - Cervical Epidural  C7/T1           Laterality - NA      Anxiolysis- RNIV      Need to hold medication - No      N/A          Clearance needed - No      Follow up - 3 week

## 2024-07-18 NOTE — TELEPHONE ENCOUNTER
----- Message from Alexandria Rollins PA-C sent at 7/15/2024  3:30 PM CDT -----  Regarding: Order for LAYLA MITCHELL    Patient Name: LAYLA MITCHELL([id number])  Sex: Female  : 1965      PCP: LUIZ MOSCOSO    Center: [facility]     Types of orders made on 07/15/2024: Procedure Request    Order Date:7/15/2024  Ordering User:ALEXANDRIA ROLLINS [[id number]]  Encounter Provider:Alexandria Rollins PA-C [[id number]]  Authorizing Provider  : Alexandria Rollins PA-C [[id number]]  Supervising Provider:ANAMARIA WALSH [[id number]]  Type of Supervision:Supervision Required  Department:[facility][[id number]]    Common Order Information  Procedure -> Epidural Injection (specify level) Cmt: C7-T1    Order Specific Information  Order: Procedure Order to Pain Management [Custom: [id number]]  Order #:          [id number]: 1 FUTURE    Priority: RoutineZ  1   Class: Clinic Performed    Future Order Information      Expires on:07/15/2025            Expected by:07/15/2024                   Associated Diagnoses      M47.22 Cervical spondylosis with radiculopathy      Facility Name: -> [facility]           Priority: Routine  Class: Clinic Performed    Future Order Information      Expires on:07/15/2025            Expected by:07/15/2024                     Associated Diagnoses      M47.22 Cervical spondylosis with radiculopathy      Procedure -> Epidural Injection (specify level) Cmt: C7-T1        Facility Name: -> [facility]

## 2024-07-19 DIAGNOSIS — M54.12 CERVICAL RADICULOPATHY: Primary | ICD-10-CM

## 2024-07-19 RX ORDER — SODIUM CHLORIDE, SODIUM LACTATE, POTASSIUM CHLORIDE, CALCIUM CHLORIDE 600; 310; 30; 20 MG/100ML; MG/100ML; MG/100ML; MG/100ML
INJECTION, SOLUTION INTRAVENOUS CONTINUOUS
OUTPATIENT
Start: 2024-07-19

## 2024-07-19 NOTE — TELEPHONE ENCOUNTER
Spoke with patient and scheduled. Pre op information given and follow up appointment to be scheduled.

## 2024-07-22 ENCOUNTER — PATIENT MESSAGE (OUTPATIENT)
Dept: ORTHOPEDICS | Facility: CLINIC | Age: 59
End: 2024-07-22
Payer: MEDICAID

## 2024-07-23 DIAGNOSIS — Z01.818 PRE-OP TESTING: ICD-10-CM

## 2024-07-23 DIAGNOSIS — M17.10 ARTHRITIS OF KNEE: Primary | ICD-10-CM

## 2024-07-23 RX ORDER — SODIUM CHLORIDE 9 MG/ML
INJECTION, SOLUTION INTRAVENOUS CONTINUOUS
OUTPATIENT
Start: 2024-07-23

## 2024-07-23 RX ORDER — MUPIROCIN 20 MG/G
OINTMENT TOPICAL
OUTPATIENT
Start: 2024-07-23

## 2024-07-25 ENCOUNTER — TELEPHONE (OUTPATIENT)
Dept: ORTHOPEDICS | Facility: CLINIC | Age: 59
End: 2024-07-25
Payer: COMMERCIAL

## 2024-07-25 NOTE — TELEPHONE ENCOUNTER
----- Message from Kellen Proctor MA sent at 7/25/2024  4:28 PM CDT -----  Contact: [facility]  CT auth, leg with out contrast  [facility] approved   [id number]  valid till 09/22/2024   Call back

## 2024-08-08 RX ORDER — FUROSEMIDE 40 MG/1
40 TABLET ORAL DAILY
COMMUNITY

## 2024-08-12 ENCOUNTER — HOSPITAL ENCOUNTER (OUTPATIENT)
Dept: RADIOLOGY | Facility: HOSPITAL | Age: 59
Discharge: HOME | End: 2024-08-12
Attending: ORTHOPAEDIC SURGERY
Payer: COMMERCIAL

## 2024-08-12 ENCOUNTER — HOSPITAL ENCOUNTER (OUTPATIENT)
Dept: PREADMISSION TESTING | Facility: HOSPITAL | Age: 59
Discharge: HOME | End: 2024-08-12
Attending: ORTHOPAEDIC SURGERY
Payer: COMMERCIAL

## 2024-08-12 DIAGNOSIS — Z01.818 PRE-OP EXAM: ICD-10-CM

## 2024-08-12 DIAGNOSIS — Z01.818 PRE-OP TESTING: ICD-10-CM

## 2024-08-12 DIAGNOSIS — M17.10 ARTHRITIS OF KNEE: ICD-10-CM

## 2024-08-12 DIAGNOSIS — M17.10 ARTHRITIS OF KNEE: Primary | ICD-10-CM

## 2024-08-12 DIAGNOSIS — Z01.818 PREOP TESTING: Primary | ICD-10-CM

## 2024-08-12 LAB
ANION GAP SERPL CALC-SCNC: 10 MMOL/L (ref 8–16)
BASOPHILS # BLD AUTO: 0.05 K/UL (ref 0–0.2)
BASOPHILS NFR BLD: 0.8 % (ref 0–1.9)
BUN SERPL-MCNC: 9 MG/DL (ref 6–20)
CALCIUM SERPL-MCNC: 9.8 MG/DL (ref 8.7–10.5)
CHLORIDE SERPL-SCNC: 103 MMOL/L (ref 95–110)
CO2 SERPL-SCNC: 28 MMOL/L (ref 23–29)
CREAT SERPL-MCNC: 0.7 MG/DL (ref 0.5–1.4)
DIFFERENTIAL METHOD BLD: ABNORMAL
EOSINOPHIL # BLD AUTO: 0.2 K/UL (ref 0–0.5)
EOSINOPHIL NFR BLD: 2.8 % (ref 0–8)
ERYTHROCYTE [DISTWIDTH] IN BLOOD BY AUTOMATED COUNT: 13 % (ref 11.5–14.5)
EST. GFR  (NO RACE VARIABLE): >60 ML/MIN/1.73 M^2
GLUCOSE SERPL-MCNC: 93 MG/DL (ref 70–110)
HCT VFR BLD AUTO: 39.3 % (ref 37–48.5)
HGB BLD-MCNC: 12.7 G/DL (ref 12–16)
IMM GRANULOCYTES # BLD AUTO: 0.01 K/UL (ref 0–0.04)
IMM GRANULOCYTES NFR BLD AUTO: 0.2 % (ref 0–0.5)
LYMPHOCYTES # BLD AUTO: 2.1 K/UL (ref 1–4.8)
LYMPHOCYTES NFR BLD: 34.4 % (ref 18–48)
MCH RBC QN AUTO: 31.3 PG (ref 27–31)
MCHC RBC AUTO-ENTMCNC: 32.3 G/DL (ref 32–36)
MCV RBC AUTO: 97 FL (ref 82–98)
MONOCYTES # BLD AUTO: 0.4 K/UL (ref 0.3–1)
MONOCYTES NFR BLD: 7.3 % (ref 4–15)
MRSA SCREEN BY PCR: NEGATIVE
NEUTROPHILS # BLD AUTO: 3.3 K/UL (ref 1.8–7.7)
NEUTROPHILS NFR BLD: 54.5 % (ref 38–73)
NRBC BLD-RTO: 0 /100 WBC
PLATELET # BLD AUTO: 290 K/UL (ref 150–450)
PMV BLD AUTO: 11.2 FL (ref 9.2–12.9)
POTASSIUM SERPL-SCNC: 3.8 MMOL/L (ref 3.5–5.1)
RBC # BLD AUTO: 4.06 M/UL (ref 4–5.4)
SODIUM SERPL-SCNC: 141 MMOL/L (ref 136–145)
WBC # BLD AUTO: 6.04 K/UL (ref 3.9–12.7)

## 2024-08-12 PROCEDURE — 93005 ELECTROCARDIOGRAM TRACING: CPT

## 2024-08-12 PROCEDURE — 87641 MR-STAPH DNA AMP PROBE: CPT | Performed by: ORTHOPAEDIC SURGERY

## 2024-08-12 PROCEDURE — 36415 COLL VENOUS BLD VENIPUNCTURE: CPT | Performed by: ORTHOPAEDIC SURGERY

## 2024-08-12 PROCEDURE — 73700 CT LOWER EXTREMITY W/O DYE: CPT | Mod: TC,LT

## 2024-08-12 PROCEDURE — 93010 ELECTROCARDIOGRAM REPORT: CPT | Mod: ,,, | Performed by: GENERAL PRACTICE

## 2024-08-12 PROCEDURE — 80048 BASIC METABOLIC PNL TOTAL CA: CPT | Performed by: ORTHOPAEDIC SURGERY

## 2024-08-12 PROCEDURE — 85025 COMPLETE CBC W/AUTO DIFF WBC: CPT | Performed by: ORTHOPAEDIC SURGERY

## 2024-08-12 PROCEDURE — 73700 CT LOWER EXTREMITY W/O DYE: CPT | Mod: 26,LT,, | Performed by: RADIOLOGY

## 2024-08-12 NOTE — PRE ADMISSION SCREENING
JOINT CAMP ASSESSMENT    Name Mignon Hardy   MRN [id number]    Age/Sex 59 y.o. female    Surgeon Dr. Brian Austin   Joint Camp Date 8/12/2024   Surgery Date 8/20/2024   Procedure Left Knee Arthroplasty   Insurance Payor: MEDICAID / Plan: HE[place] BLUE [place] / Product Type: Managed Medicaid /    Care Team Patient Care Team:  Wagner Rondon MD as PCP - General ([facility])    Pharmacy   [address] [address]  Phone: [phone] Fax: [phone]     AM-PAC Score   19   Risk Assessment Score 12     Past Medical History:   Diagnosis Date    Arthritis     Bilateral Knee Arthritis    Chronic bilateral low back pain with left-sided sciatica 02/10/2021    Digestive disorder     Essential tremor 02/10/2021    Heart disease     Hypertension     Obesity     Thyroid disease     NODULES       Past Surgical History:   Procedure Laterality Date    CARPAL TUNNEL RELEASE Right 05/09/2023    Procedure: Right carpal tunnel release;  Surgeon: Kash Sahni MD;  Location: [facility] OR;  Service: Orthopedics;  Laterality: Right;    CARPAL TUNNEL RELEASE Left 08/03/2023    Procedure: Left carpal tunnel release;  Surgeon: Kash Sahni MD;  Location: [facility] OR;  Service: Orthopedics;  Laterality: Left;    EPIDURAL STEROID INJECTION INTO CERVICAL SPINE N/A 06/07/2023    Procedure: Injection-steroid-epidural-cervical C7-T1;  Surgeon: Aileen Ocampo DO;  Location: [facility];  Service: Pain Management;  Laterality: N/A;    ESOPHAGEAL DILATION N/A 05/20/2022    Procedure: DILATION, ESOPHAGUS;  Surgeon: Jose Aguilera MD;  Location: [facility];  Service: Endoscopy;  Laterality: N/A;  Bates    ESOPHAGOGASTRODUODENOSCOPY N/A 05/20/2022    Procedure: EGD (ESOPHAGOGASTRODUODENOSCOPY);  Surgeon: Jose Aguilera MD;  Location: [facility];  Service: Endoscopy;  Laterality: N/A;    HYSTEROSCOPY WITH DILATION AND  CURETTAGE OF UTERUS N/A 10/20/2023    Procedure: HYSTEROSCOPY, WITH DILATION AND CURETTAGE OF UTERUS;  Surgeon: Noa Farias MD;  Location: [facility];  Service: OB/GYN;  Laterality: N/A;    REMOVAL-MASS      of thyroid    TUBAL LIGATION           Home Enviroment     Living Arrangement: Lives with spouse  Home Environment: 1-story house/ trailer, number of outside stairs: 5 with a railing, tub-shower  Home Safety Concerns: Pets in the home: dogs (10).    DISCHARGE CAREGIVER/SUPPORT SYSTEM     Identified post-op caregiver: Patient has spouse / significant other.  Patient's caregiver(s) will be able to provide physical assistance. Patient will have someone to assist overnight.      Caregiver present at pre-op interview:  No      PRE-OPERATIVE FUNCTIONAL STATUS     Employment: Employed full time    Pre-op Functional Status: Patient is independent with mobility/ambulation, transfers, ADL's, IADL's.    Use of assistive device for ambulation: straight cane  ADL: self care  ADL Limitations: difficulty with walking  Medical Restrictions: Unstable ambulation and Decreased range of motions in extremities    POTENTIAL BARRIERS TO DISCHARGE/POTENTIAL POST-OP COMPLICATIONS     Patient with hx of HTN, heart disease, obesity. POSSIBLE SAME DAY DISCHARGE.    DISCHARGE PLAN     Expected LOS of 1 days or less for joint replacement discussed with patient.     Patient in agreement with discharge plan: Yes    Discharge to: Outpatient PT and Home with 24 hour assistance     HH:  None per insurance coverage.      OP PT: [facility] ([place])     Home DME: single point cane    Needed DME at D/C: rolling walker, bedside commode, and tub transfer bench. Patient to purchase BSC & Tub Transfer Bench from retail prior to surgery.     Rx: Per Dr. Austin at discharge     Meds to Beds: Yes  Patient expected to discharge on Aspirin 81mg by mouth twice daily for DVT prophylaxis.

## 2024-08-12 NOTE — PRE ADMISSION SCREENING
"               CJR Risk Assessment Scale    Patient Name: Mignon Hardy  YOB: 1965  MRN: [id number]            RIsk Factor Measure Recommendation Patient Data Scale/Score   BMI >40 Reconsider surgery, weight loss   Estimated body mass index is 36.18 kg/m² as calculated from the following:    Height as of 8/8/24: 5' 9" (1.753 m).    Weight as of 8/8/24: 111.1 kg (245 lb).   [] 0 = 1 - 24.9  [] 1 = 25-29.9  [] 2 = 30-34.9  [x] 3 = 35-39.9  [] 4 = 40-44.9  [] 5 = 45-99.9   Hemoglobin AIC (if applicable) >9 Delay surgery until DM under control  Refer for:  Nutrition Therapy  Exercise   Medication    Lab Results   Component Value Date    HGBA1C 5.7 (H) 09/25/2023       Lab Results   Component Value Date    GLU 93 08/12/2024      [] 0 = 4.0-5.6  [x] 1 = 5.7-6.4  [] 2 = 6.5-6.9  [] 3 = 7.0-7.9  [] 4 = 8.0-8.9  [] 5 = 9.0-12   Hemoglobin (Anemia) <9 Delay surgery   Correct anemia Lab Results   Component Value Date    HGB 12.7 08/12/2024    [] 20 - <9.0                    Albumin <3 Delay surgery &Workup Lab Results   Component Value Date    ALBUMIN 4.4 01/03/2008    [] 20 - <3.0   Smoking Cessation >4 Weeks Delay Surgery  Refer to OP Cessation Class    Never Smoker [] 20 - current smoker                                _____ PPD                    Hx of MI, PE, Arrhythmia, CVA, DVT <30 Days Delay Surgery    N/A [] 20      Infection Variable Delay surgery and re-evaluate   N/A [] 20 - recent/current infection     Depression (PHQ) >10 out of 27 Delay Surgery and re-evaluate  Medication  Counseling              [x] 0     []1     []2     []3      []4      [] 5                    (1-4)      (5-9)  (10-14)  (15-19)   (20-27)     Memory Impairment & Memory loss (Mini-Cog Screening Tool) Advanced dementia and/or Parkinson's Reconsider surgery     [x] 0     []1     []2     []3     []4     [] 5     Physical Conditioning (Modified AM-PAC Per Physical Therapy at [facility]) Unable to ambulate on day of surgery Delay " surgery and re-evaluate  Pre-Rehabilitation   (PT evaluation)       []  0   []4       [x]8     []12        []16     []20       (<20%)   (<40%)   (<60%)   (<80% )    (>80%)     Home Environment/Caregiver support  (Per /Navigator Interview)    Availability of basic services and/or approprate assistance during post-operative period Delay surgery and re-evaluate  Safe home environment  Health   1 week post-surgery  Transportation  availability  Ability to obtain DME/Medications post-op    [x] 0     []1     []2     []3     []4     [] 5  [x] 0     []1     []2     []3     []4     [] 5  [x] 0     []1     []2     []3     []4     [] 5  [x] 0     []1     []2     []3     []4     [] 5         MD Contact: Dr. Austin Comments:  Total Score:  12

## 2024-08-12 NOTE — DISCHARGE INSTRUCTIONS
To confirm, Your doctor has instructed you that surgery is scheduled for: 8/20/24    Please report to [facility], Registration the morning of surgery. You must check-in and receive a wristband before going to your procedure.  [address] [place] [address] [place] [address]    Pre-Op will call the afternoon prior to surgery between 1:00 and 6:00 PM with the final arrival time.  Phone number: [phone]    PLEASE NOTE:  The surgery schedule has many variables which may affect the time of your surgery case.  Family members should be available if your surgery time changes.  Plan to be here the day of your procedure between 4-6 hours.    MEDICATIONS:  TAKE ONLY THESE MEDICATIONS WITH A SMALL SIP OF WATER THE MORNING OF YOUR PROCEDURE:    SEE MED LIST          DO NOT TAKE THESE MEDICATIONS 5-7 DAYS PRIOR to your procedure or per your surgeon's request:   ASPIRIN, ALEVE, ADVIL, IBUPROFEN, FISH OIL VITAMIN E, HERBALS  (May take Tylenol)    ONLY if you are prescribed any types of blood thinners such as:  Aspirin, Coumadin, Plavix, Pradaxa, Xarelto, Aggrenox, Effient, Eliquis, Savasya, Brilinta, or any other, ask your surgeon whether you should stop taking them and how long before surgery you should stop.  You may also need to verify with the prescribing physician if it is ok to stop your medication.      INSTRUCTIONS IMPORTANT!!  Do not eat or drink anything between midnight and the time of your procedure- this includes gum, mints, and candy.  Do not smoke or drink alcoholic beverages 24 hours prior to your procedure.  Shower the night before AND the morning of your procedure with a Chlorhexidine wash such as Hibiclens or Dial antibacterial soap from the neck down.  Do not get it on your face or in your eyes.  You may use your own shampoo and face wash. This helps your skin to be as bacteria free as possible.    If you wear contact lenses, dentures, hearing aids or glasses, bring a container to put them in  during surgery and give to a family member for safe keeping.  Please leave all jewelry, piercing's and valuables at home. You must remove your false eyelashes prior to surgery.    DO NOT remove hair from the surgery site.  Do not shave the incision site unless you are given specific instructions to do so.    ONLY if you have been diagnosed with sleep apnea please bring your C-PAP machine.  ONLY if you wear home oxygen please bring your portable oxygen tank the day of your procedure.  ONLY if you have a history of OPEN HEART SURGERY you will need a clearance from your Cardiologist per Anesthesia.      ONLY for patients requiring bowel prep, written instructions will be given by your doctor's office.  ONLY if you have a neuro stimulator, please bring the controller with you the morning of surgery  ONLY if a type and screen test is needed before surgery, please return:  If your doctor has scheduled you for an overnight stay, bring a small overnight bag with any personal items you need.  Make arrangements in advance for transportation home by a responsible adult. You can not go home in an uber or a cab per hospital policy.  It is not safe to drive a vehicle during the 24 hours after anesthesia.          All  facilities and properties are tobacco free.  Smoking is NOT allowed.   If you have any questions about these instructions, call Pre-Op Admit  Nursing at [phone] or the Pre-Op Day Surgery Unit at [phone].

## 2024-08-12 NOTE — PRE ADMISSION SCREENING
Patient Name: Mignon Hardy  YOB: 1965   MRN: [id number]     [facility]   Basic Mobility Inpatient Short Form 6 Clicks         How much difficulty does the patient currently have  Unable  A Lot  A Little  None      1. Turning over in bed (including adjusting bedclothes, sheets and blankets)?     1 []    2 []    3 [x]    4 []        2. Sitting down on and standing up from a chair with arms (e.g., wheelchair, bedside commode, etc.)     1 []  2 [x]  3 []     4 []      3. Moving from lying on back to sitting on the side of the bed?     1 []  2 [x]  3 []    4 []    How much help from another person does the patient currently need  Total  A Lot  A Little  None      4. Moving to and from a bed to a chair (including a wheelchair)?    1 []  2 []  3 []    4 [x]      5. Need to walk in hospital room?    1 []  2 []  3 []    4 [x]      6. Climbing 3-5 steps with a railing?    1 []  2 []  3 []    4 [x]       Raw Score:     19             CMS 0-100% Score:    41.77        %   Standardized Score:   45.44            CMS Modifier:     CK                                     [facility]   Basic Mobility Inpatient Short Form 6 Clicks Score Conversion Table*         *Use this form to convert -PAC Basic Mobility Inpatient Raw Scores.   [facility] Inpatient Basic Mobility Short Form Scoring Example   1. Add the number values associated with the response to each item. For example, items totals yield a Raw Score of 21.   2. Match the raw score to the t-Scale scores (t-Scale score = 50.25, SE = 4.69).   3. Find the associated CMS % (CMS % = 28.97%).   4. Locate the correct CMS Functional Modifier Code, or G Code (G code = CJ)     NOTE: Each -PAC Short Form has a separate conversion table. Make sure that you use the correct conversion table.       Instruction Manual - page 45 contains conversion table

## 2024-08-14 ENCOUNTER — HOSPITAL ENCOUNTER (OUTPATIENT)
Facility: HOSPITAL | Age: 59
Discharge: HOME | End: 2024-08-14
Attending: ANESTHESIOLOGY | Admitting: ANESTHESIOLOGY
Payer: MEDICAID

## 2024-08-14 ENCOUNTER — HOSPITAL ENCOUNTER (OUTPATIENT)
Dept: RADIOLOGY | Facility: HOSPITAL | Age: 59
Discharge: HOME | End: 2024-08-14
Attending: ANESTHESIOLOGY | Admitting: ANESTHESIOLOGY
Payer: MEDICAID

## 2024-08-14 VITALS
BODY MASS INDEX: 36.29 KG/M2 | WEIGHT: 245 LBS | SYSTOLIC BLOOD PRESSURE: 120 MMHG | HEIGHT: 69 IN | RESPIRATION RATE: 17 BRPM | TEMPERATURE: 99 F | DIASTOLIC BLOOD PRESSURE: 71 MMHG | HEART RATE: 76 BPM | OXYGEN SATURATION: 98 %

## 2024-08-14 DIAGNOSIS — M54.12 CERVICAL RADICULOPATHY: ICD-10-CM

## 2024-08-14 DIAGNOSIS — M54.2 NECK PAIN: ICD-10-CM

## 2024-08-14 PROCEDURE — A4216 STERILE WATER/SALINE, 10 ML: HCPCS | Mod: PO | Performed by: ANESTHESIOLOGY

## 2024-08-14 PROCEDURE — 62321 NJX INTERLAMINAR CRV/THRC: CPT | Mod: ,,, | Performed by: ANESTHESIOLOGY

## 2024-08-14 PROCEDURE — 62321 NJX INTERLAMINAR CRV/THRC: CPT | Mod: PO | Performed by: ANESTHESIOLOGY

## 2024-08-14 PROCEDURE — 25500020 PHARM REV CODE 255: Mod: PO | Performed by: ANESTHESIOLOGY

## 2024-08-14 PROCEDURE — 63600175 PHARM REV CODE 636 W HCPCS: Mod: PO | Performed by: ANESTHESIOLOGY

## 2024-08-14 PROCEDURE — 25000003 PHARM REV CODE 250: Mod: PO | Performed by: ANESTHESIOLOGY

## 2024-08-14 RX ORDER — LIDOCAINE HYDROCHLORIDE 10 MG/ML
INJECTION, SOLUTION EPIDURAL; INFILTRATION; INTRACAUDAL; PERINEURAL
Status: DISCONTINUED | OUTPATIENT
Start: 2024-08-14 | End: 2024-08-14 | Stop reason: HOSPADM

## 2024-08-14 RX ORDER — METHYLPREDNISOLONE ACETATE 80 MG/ML
INJECTION, SUSPENSION INTRA-ARTICULAR; INTRALESIONAL; INTRAMUSCULAR; SOFT TISSUE
Status: DISCONTINUED | OUTPATIENT
Start: 2024-08-14 | End: 2024-08-14 | Stop reason: HOSPADM

## 2024-08-14 RX ORDER — MIDAZOLAM HYDROCHLORIDE 1 MG/ML
INJECTION INTRAMUSCULAR; INTRAVENOUS
Status: DISCONTINUED | OUTPATIENT
Start: 2024-08-14 | End: 2024-08-14 | Stop reason: HOSPADM

## 2024-08-14 RX ORDER — SODIUM CHLORIDE 9 MG/ML
INJECTION, SOLUTION INTRAMUSCULAR; INTRAVENOUS; SUBCUTANEOUS
Status: DISCONTINUED | OUTPATIENT
Start: 2024-08-14 | End: 2024-08-14 | Stop reason: HOSPADM

## 2024-08-14 RX ORDER — SODIUM CHLORIDE, SODIUM LACTATE, POTASSIUM CHLORIDE, CALCIUM CHLORIDE 600; 310; 30; 20 MG/100ML; MG/100ML; MG/100ML; MG/100ML
INJECTION, SOLUTION INTRAVENOUS CONTINUOUS
Status: DISCONTINUED | OUTPATIENT
Start: 2024-08-14 | End: 2024-08-14 | Stop reason: HOSPADM

## 2024-08-14 RX ADMIN — SODIUM CHLORIDE, SODIUM LACTATE, POTASSIUM CHLORIDE, AND CALCIUM CHLORIDE: .6; .31; .03; .02 INJECTION, SOLUTION INTRAVENOUS at 01:08

## 2024-08-14 NOTE — OP NOTE
PROCEDURE DATE: 8/14/2024    Procedure: C7-T1 cervical interlaminar epidural steroid injection under utilizing fluoroscopy.    Diagnosis: Cervical Radiculopathy    POSTOP DIAGNOSIS: SAME    Physician: Iván Qureshi MD    Medications injected:  Methylprednisone 80mg followed by a slow injection of 4 mL sterile, preservative-free normal saline.    Local anesthetic used: Lidocaine 1%, 4 ml.    Sedation Medications: 2mg versed    Complications:  none    Estimated blood loss: none    Technique:  A time-out was taken to identify patient and procedure prior to starting the procedure.  With the patient laying in a prone position with the neck in a mid-flexed forward position, the area was prepped and draped in the usual sterile fashion using ChloraPrep and a fenestrated drape.  The area was determined under AP fluoroscopic guidance.  Local anesthetic was given using a 25-gauge 1.5 inch needle by raising a wheal and then infiltrating ventrally.  A 3.5 inch 20-gauge Touhy needle was introduced under fluoroscopic guidance to meet the lamina of C7.  The needle was then hinged under the lamina then advanced using loss of resistance technique.  Once the tip of the needle was in the desired position, the contrast dye Omnipaque was injected to determine placement and no uptake.  The steroid was then injected slowly followed by a slow injection of 4 mL of the sterile preservative-free normal saline.  The patient tolerated the procedure well.    The patient was monitored after the procedure and was given post-procedure and discharge instructions to follow at home. The patient was discharged in a stable condition.

## 2024-08-14 NOTE — DISCHARGE SUMMARY
[place] - Surgery  Discharge Note  Short Stay    Procedure(s) (LRB):  Injection-steroid-epidural-cervical  C7/T1 (N/A)      OUTCOME: Patient tolerated treatment/procedure well without complication and is now ready for discharge.    DISPOSITION: Home or Self Care    FINAL DIAGNOSIS:  Cervical radiculopathy    FOLLOWUP: In clinic    DISCHARGE INSTRUCTIONS:    Discharge Procedure Orders   Diet Adult Regular     No dressing needed     Notify your health care provider if you experience any of the following:  temperature >100.4     Activity as tolerated

## 2024-08-14 NOTE — H&P
CC: Neck pain    HPI: The patient is a 60yo woman with a history of cervical radiculopathy here for cervical KAILEE. There are no major changes in history and physical from 6/19/24 by TESSA Stack.    Past Medical History:   Diagnosis Date    Arthritis     Bilateral Knee Arthritis    Chronic bilateral low back pain with left-sided sciatica 02/10/2021    Digestive disorder     Essential tremor 02/10/2021    Heart disease     Hypertension     Obesity     Thyroid disease     NODULES       Past Surgical History:   Procedure Laterality Date    CARPAL TUNNEL RELEASE Right 05/09/2023    Procedure: Right carpal tunnel release;  Surgeon: Kash Sahni MD;  Location: [facility] OR;  Service: Orthopedics;  Laterality: Right;    CARPAL TUNNEL RELEASE Left 08/03/2023    Procedure: Left carpal tunnel release;  Surgeon: Kash Sahni MD;  Location: [facility] OR;  Service: Orthopedics;  Laterality: Left;    EPIDURAL STEROID INJECTION INTO CERVICAL SPINE N/A 06/07/2023    Procedure: Injection-steroid-epidural-cervical C7-T1;  Surgeon: Aileen Ocampo DO;  Location: [facility]IN [facility]NAGEMENT;  Service: Pain Management;  Laterality: N/A;    ESOPHAGEAL DILATION N/A 05/20/2022    Procedure: DILATION, ESOPHAGUS;  Surgeon: Jose Aguilera MD;  Location: [facility];  Service: Endoscopy;  Laterality: N/A;  Bates    ESOPHAGOGASTRODUODENOSCOPY N/A 05/20/2022    Procedure: EGD (ESOPHAGOGASTRODUODENOSCOPY);  Surgeon: Jose Aguilera MD;  Location: [facility];  Service: Endoscopy;  Laterality: N/A;    HYSTEROSCOPY WITH DILATION AND CURETTAGE OF UTERUS N/A 10/20/2023    Procedure: HYSTEROSCOPY, WITH DILATION AND CURETTAGE OF UTERUS;  Surgeon: Noa Farias MD;  Location: [facility] OR;  Service: OB/GYN;  Laterality: N/A;    REMOVAL-MASS      of thyroid    TUBAL LIGATION         Family History   Problem Relation Name Age of Onset    Thyroid cancer Mother      Cancer Mother          breast    Dementia Mother      Heart disease Father       Tremor Father      Pacemaker/defibrilator Father      Thyroid cancer Sister      Ovarian cancer Maternal Aunt          20's    Cancer Maternal Grandmother      Colon cancer Neg Hx         Social History     Socioeconomic History    Marital status:    Tobacco Use    Smoking status: Never    Smokeless tobacco: Never   Substance and Sexual Activity    Alcohol use: Never    Drug use: Never    Sexual activity: Yes     Partners: Male     Birth control/protection: None     Social Determinants of Health     Financial Resource Strain: High Risk (3/25/2024)    Overall Financial Resource Strain (CARDIA)     Difficulty of Paying Living Expenses: Hard   Food Insecurity: Food Insecurity Present (3/25/2024)    Hunger Vital Sign     Worried About Running Out of Food in the Last Year: Often true     Ran Out of Food in the Last Year: Often true   Transportation Needs: No Transportation Needs (3/25/2024)    PRAPARE - Transportation     Lack of Transportation (Medical): No     Lack of Transportation (Non-Medical): No   Physical Activity: Insufficiently Active (3/25/2024)    Exercise Vital Sign     Days of Exercise per Week: 1 day     Minutes of Exercise per Session: 10 min   Stress: Stress Concern Present (3/25/2024)    [place] [facility] [employer] [facility] - Occupational Stress Questionnaire     Feeling of Stress : To some extent   Housing Stability: High Risk (3/25/2024)    Housing Stability Vital Sign     Unable to Pay for Housing in the Last Year: Yes     Number of Places Lived in the Last Year: 1     Unstable Housing in the Last Year: No       No current facility-administered medications for this encounter.     Facility-Administered Medications Ordered in Other Encounters   Medication Dose Route Frequency Provider Last Rate Last Admin    dextrose 50% injection 12.5 g  12.5 g Intravenous Cassandra Jimenez MD        dextrose 50% injection 25 g  25 g Intravenous Cassandra Jimenez MD        insulin regular  "injection 0-8 Units  0-8 Units Intravenous PRN Cassandra Thompson MD        lactated ringers infusion   Intravenous Continuous Cassandra Thompson MD   New Bag at 10/20/23 1159    LIDOcaine (PF) 10 mg/ml (1%) injection 10 mg  1 mL Intradermal Once Cassandra Thompson MD           Review of patient's allergies indicates:  No Known Allergies    Vitals:    08/08/24 1200 08/14/24 1319 08/14/24 1325   BP:   (!) 140/64   Pulse:   74   Resp:   17   Temp:  98.7 °F (37.1 °C)    TempSrc:  Skin    SpO2:   99%   Weight: 111.1 kg (245 lb) 111.1 kg (245 lb)    Height: 5' 9" (1.753 m) 5' 9" (1.753 m)        ASA 2, Mallampati 2    REVIEW OF SYSTEMS:     GENERAL: No weight loss, malaise or fevers.  HEENT:  No recent changes in vision or hearing  NECK: Negative for lumps, no difficulty with swallowing.  RESPIRATORY: Negative for cough, wheezing or shortness of breath, patient denies any recent URI.  CARDIOVASCULAR: Negative for chest pain, leg swelling or palpitations.  GI: Negative for abdominal discomfort, blood in stools or black stools or change in bowel habits.  MUSCULOSKELETAL: See HPI.  SKIN: Negative for lesions, rash, and itching.  PSYCH: No suicidal or homicidal ideations, no current mood disturbances.  HEMATOLOGY/LYMPHOLOGY: Negative for prolonged bleeding, bruising easily or swollen nodes. Patient is not currently taking any anti-coagulants  ENDO: No history of diabetes or thyroid dysfunction  NEURO: No history of syncope, paralysis, seizures or tremors.All other reviewed and negative other than HPI.    Physical exam:  Gen: A and O x3, pleasant, well-groomed  Skin: No rashes or obvious lesions  HEENT: PERRLA, no obvious deformities on ears or in canals. No thyroid masses, trachea midline, no palpable lymph nodes in neck, axilla.  CVS: Regular rate and rhythm, normal S1 and S2, no murmurs.  Resp: Clear to auscultation bilaterally.  Abdomen: Soft, NT/ND, normal bowel sounds present.  Musculoskeletal/Neuro: Moving all " extremities    Assessment:  Cervical radiculopathy  -     Place in Outpatient; Standing  -     Vital signs; Standing  -     Place 18-22 gauage peripheral IV ; Standing  -     Verify informed consent; Standing  -     Notify physician ; Standing  -     Notify physician ; Standing  -     Notify physician (specify); Standing  -     Diet NPO; Standing  -     lactated ringers infusion    Other orders  -     IP VTE LOW RISK PATIENT; Standing

## 2024-08-15 LAB
OHS QRS DURATION: 98 MS
OHS QTC CALCULATION: 429 MS

## 2024-08-19 ENCOUNTER — ANESTHESIA EVENT (OUTPATIENT)
Dept: SURGERY | Facility: HOSPITAL | Age: 59
End: 2024-08-19
Payer: MEDICAID

## 2024-08-19 RX ORDER — IBUPROFEN 600 MG/1
600 TABLET ORAL 3 TIMES DAILY
Qty: 30 TABLET | Refills: 0 | Status: SHIPPED | OUTPATIENT
Start: 2024-08-19

## 2024-08-19 RX ORDER — OXYCODONE HCL 10 MG/1
10 TABLET, FILM COATED, EXTENDED RELEASE ORAL ONCE
OUTPATIENT
Start: 2024-08-19 | End: 2024-08-19

## 2024-08-19 RX ORDER — OXYCODONE AND ACETAMINOPHEN 5; 325 MG/1; MG/1
1 TABLET ORAL EVERY 6 HOURS PRN
Qty: 28 TABLET | Refills: 0 | Status: SHIPPED | OUTPATIENT
Start: 2024-08-19

## 2024-08-19 RX ORDER — LIDOCAINE HYDROCHLORIDE 10 MG/ML
1 INJECTION, SOLUTION EPIDURAL; INFILTRATION; INTRACAUDAL; PERINEURAL ONCE
OUTPATIENT
Start: 2024-08-19 | End: 2024-08-19

## 2024-08-19 RX ORDER — CYCLOBENZAPRINE HCL 10 MG
10 TABLET ORAL 3 TIMES DAILY PRN
Qty: 30 TABLET | Refills: 0 | Status: SHIPPED | OUTPATIENT
Start: 2024-08-19 | End: 2024-08-29

## 2024-08-19 RX ORDER — CELECOXIB 100 MG/1
400 CAPSULE ORAL ONCE
OUTPATIENT
Start: 2024-08-19 | End: 2024-08-19

## 2024-08-19 RX ORDER — ACETAMINOPHEN 500 MG
1000 TABLET ORAL EVERY 8 HOURS PRN
Qty: 30 TABLET | Refills: 0 | Status: SHIPPED | OUTPATIENT
Start: 2024-08-19

## 2024-08-19 RX ORDER — ONDANSETRON 4 MG/1
4 TABLET, FILM COATED ORAL EVERY 6 HOURS PRN
Qty: 30 TABLET | Refills: 0 | Status: SHIPPED | OUTPATIENT
Start: 2024-08-19

## 2024-08-19 RX ORDER — ASPIRIN 81 MG/1
81 TABLET ORAL 2 TIMES DAILY
Qty: 56 TABLET | Refills: 0 | Status: SHIPPED | OUTPATIENT
Start: 2024-08-19 | End: 2025-08-19

## 2024-08-20 ENCOUNTER — ANESTHESIA (OUTPATIENT)
Dept: SURGERY | Facility: HOSPITAL | Age: 59
End: 2024-08-20
Payer: MEDICAID

## 2024-08-20 ENCOUNTER — HOSPITAL ENCOUNTER (OUTPATIENT)
Facility: HOSPITAL | Age: 59
Discharge: HOME | End: 2024-08-20
Attending: ORTHOPAEDIC SURGERY | Admitting: ORTHOPAEDIC SURGERY
Payer: MEDICAID

## 2024-08-20 DIAGNOSIS — M17.10 ARTHRITIS OF KNEE: ICD-10-CM

## 2024-08-20 DIAGNOSIS — Z01.818 PRE-OP TESTING: ICD-10-CM

## 2024-08-20 PROCEDURE — 37000008 HC ANESTHESIA 1ST 15 MINUTES: Performed by: ORTHOPAEDIC SURGERY

## 2024-08-20 PROCEDURE — C1769 GUIDE WIRE: HCPCS | Performed by: ORTHOPAEDIC SURGERY

## 2024-08-20 PROCEDURE — C9290 INJ, BUPIVACAINE LIPOSOME: HCPCS | Performed by: ANESTHESIOLOGY

## 2024-08-20 PROCEDURE — 71000039 HC RECOVERY, EACH ADD'L HOUR: Performed by: ORTHOPAEDIC SURGERY

## 2024-08-20 PROCEDURE — 25000003 PHARM REV CODE 250: Performed by: ANESTHESIOLOGY

## 2024-08-20 PROCEDURE — 71000015 HC POSTOP RECOV 1ST HR: Performed by: ORTHOPAEDIC SURGERY

## 2024-08-20 PROCEDURE — 36000713 HC OR TIME LEV V EA ADD 15 MIN: Performed by: ORTHOPAEDIC SURGERY

## 2024-08-20 PROCEDURE — 71000033 HC RECOVERY, INTIAL HOUR: Performed by: ORTHOPAEDIC SURGERY

## 2024-08-20 PROCEDURE — 0055T BONE SRGRY CMPTR CT/MRI IMAG: CPT | Mod: ,,, | Performed by: ORTHOPAEDIC SURGERY

## 2024-08-20 PROCEDURE — 25000003 PHARM REV CODE 250: Performed by: ORTHOPAEDIC SURGERY

## 2024-08-20 PROCEDURE — 63600175 PHARM REV CODE 636 W HCPCS: Performed by: ORTHOPAEDIC SURGERY

## 2024-08-20 PROCEDURE — 36000712 HC OR TIME LEV V 1ST 15 MIN: Performed by: ORTHOPAEDIC SURGERY

## 2024-08-20 PROCEDURE — 27201423 OPTIME MED/SURG SUP & DEVICES STERILE SUPPLY: Performed by: ORTHOPAEDIC SURGERY

## 2024-08-20 PROCEDURE — C1776 JOINT DEVICE (IMPLANTABLE): HCPCS | Performed by: ORTHOPAEDIC SURGERY

## 2024-08-20 PROCEDURE — 71000016 HC POSTOP RECOV ADDL HR: Performed by: ORTHOPAEDIC SURGERY

## 2024-08-20 PROCEDURE — 63600175 PHARM REV CODE 636 W HCPCS: Performed by: NURSE ANESTHETIST, CERTIFIED REGISTERED

## 2024-08-20 PROCEDURE — 97116 GAIT TRAINING THERAPY: CPT

## 2024-08-20 PROCEDURE — 97161 PT EVAL LOW COMPLEX 20 MIN: CPT

## 2024-08-20 PROCEDURE — 63600175 PHARM REV CODE 636 W HCPCS

## 2024-08-20 PROCEDURE — 94799 UNLISTED PULMONARY SVC/PX: CPT

## 2024-08-20 PROCEDURE — 97110 THERAPEUTIC EXERCISES: CPT

## 2024-08-20 PROCEDURE — 37000009 HC ANESTHESIA EA ADD 15 MINS: Performed by: ORTHOPAEDIC SURGERY

## 2024-08-20 PROCEDURE — 63600175 PHARM REV CODE 636 W HCPCS: Performed by: ANESTHESIOLOGY

## 2024-08-20 PROCEDURE — 27447 TOTAL KNEE ARTHROPLASTY: CPT | Mod: LT,,, | Performed by: ORTHOPAEDIC SURGERY

## 2024-08-20 PROCEDURE — C1713 ANCHOR/SCREW BN/BN,TIS/BN: HCPCS | Performed by: ORTHOPAEDIC SURGERY

## 2024-08-20 PROCEDURE — 64447 NJX AA&/STRD FEMORAL NRV IMG: CPT | Performed by: ANESTHESIOLOGY

## 2024-08-20 PROCEDURE — 25000003 PHARM REV CODE 250: Performed by: NURSE ANESTHETIST, CERTIFIED REGISTERED

## 2024-08-20 DEVICE — CRUCIATE RETAINING FEMORAL
Type: IMPLANTABLE DEVICE | Site: KNEE | Status: FUNCTIONAL
Brand: TRIATHLON

## 2024-08-20 DEVICE — CEMENT BONE ANTIBIO SIMPLEX P: Type: IMPLANTABLE DEVICE | Site: KNEE | Status: FUNCTIONAL

## 2024-08-20 DEVICE — PATELLA
Type: IMPLANTABLE DEVICE | Site: KNEE | Status: FUNCTIONAL
Brand: TRIATHLON

## 2024-08-20 DEVICE — PRIMARY TIBIAL BASEPLATE
Type: IMPLANTABLE DEVICE | Site: KNEE | Status: FUNCTIONAL
Brand: TRIATHLON

## 2024-08-20 DEVICE — TIBIAL BEARING INSERT
Type: IMPLANTABLE DEVICE | Site: KNEE | Status: FUNCTIONAL
Brand: TRIATHLON

## 2024-08-20 RX ORDER — FENTANYL CITRATE 50 UG/ML
INJECTION, SOLUTION INTRAMUSCULAR; INTRAVENOUS
Status: DISCONTINUED | OUTPATIENT
Start: 2024-08-20 | End: 2024-08-20

## 2024-08-20 RX ORDER — DEXAMETHASONE SODIUM PHOSPHATE 4 MG/ML
INJECTION, SOLUTION INTRA-ARTICULAR; INTRALESIONAL; INTRAMUSCULAR; INTRAVENOUS; SOFT TISSUE
Status: DISCONTINUED | OUTPATIENT
Start: 2024-08-20 | End: 2024-08-20

## 2024-08-20 RX ORDER — SODIUM CHLORIDE, SODIUM LACTATE, POTASSIUM CHLORIDE, CALCIUM CHLORIDE 600; 310; 30; 20 MG/100ML; MG/100ML; MG/100ML; MG/100ML
INJECTION, SOLUTION INTRAVENOUS CONTINUOUS
Status: CANCELLED | OUTPATIENT
Start: 2024-08-20

## 2024-08-20 RX ORDER — EPHEDRINE SULFATE 50 MG/ML
INJECTION, SOLUTION INTRAVENOUS
Status: DISCONTINUED | OUTPATIENT
Start: 2024-08-20 | End: 2024-08-20

## 2024-08-20 RX ORDER — MIDAZOLAM HYDROCHLORIDE 1 MG/ML
INJECTION INTRAMUSCULAR; INTRAVENOUS
Status: DISCONTINUED | OUTPATIENT
Start: 2024-08-20 | End: 2024-08-20

## 2024-08-20 RX ORDER — OXYCODONE HYDROCHLORIDE 5 MG/1
5 TABLET ORAL
Status: DISCONTINUED | OUTPATIENT
Start: 2024-08-20 | End: 2024-08-20 | Stop reason: HOSPADM

## 2024-08-20 RX ORDER — ACETAMINOPHEN 500 MG
1000 TABLET ORAL
Status: COMPLETED | OUTPATIENT
Start: 2024-08-20 | End: 2024-08-20

## 2024-08-20 RX ORDER — ONDANSETRON HYDROCHLORIDE 2 MG/ML
INJECTION, SOLUTION INTRAVENOUS
Status: DISCONTINUED | OUTPATIENT
Start: 2024-08-20 | End: 2024-08-20

## 2024-08-20 RX ORDER — SODIUM CHLORIDE 9 MG/ML
INJECTION, SOLUTION INTRAVENOUS CONTINUOUS
Status: DISCONTINUED | OUTPATIENT
Start: 2024-08-20 | End: 2024-08-20 | Stop reason: HOSPADM

## 2024-08-20 RX ORDER — FENTANYL CITRATE 50 UG/ML
25 INJECTION, SOLUTION INTRAMUSCULAR; INTRAVENOUS EVERY 5 MIN PRN
Status: DISCONTINUED | OUTPATIENT
Start: 2024-08-20 | End: 2024-08-20 | Stop reason: HOSPADM

## 2024-08-20 RX ORDER — BUPIVACAINE HYDROCHLORIDE 5 MG/ML
INJECTION, SOLUTION EPIDURAL; INTRACAUDAL
Status: COMPLETED | OUTPATIENT
Start: 2024-08-20 | End: 2024-08-20

## 2024-08-20 RX ORDER — MUPIROCIN 20 MG/G
OINTMENT TOPICAL
Status: DISCONTINUED | OUTPATIENT
Start: 2024-08-20 | End: 2024-08-20 | Stop reason: HOSPADM

## 2024-08-20 RX ORDER — LIDOCAINE HYDROCHLORIDE 20 MG/ML
INJECTION INTRAVENOUS
Status: DISCONTINUED | OUTPATIENT
Start: 2024-08-20 | End: 2024-08-20

## 2024-08-20 RX ORDER — PROPOFOL 10 MG/ML
VIAL (ML) INTRAVENOUS CONTINUOUS PRN
Status: DISCONTINUED | OUTPATIENT
Start: 2024-08-20 | End: 2024-08-20

## 2024-08-20 RX ORDER — BUPIVACAINE HYDROCHLORIDE 7.5 MG/ML
INJECTION, SOLUTION EPIDURAL; RETROBULBAR
Status: COMPLETED | OUTPATIENT
Start: 2024-08-20 | End: 2024-08-20

## 2024-08-20 RX ORDER — METOCLOPRAMIDE HYDROCHLORIDE 5 MG/ML
10 INJECTION INTRAMUSCULAR; INTRAVENOUS EVERY 10 MIN PRN
Status: DISCONTINUED | OUTPATIENT
Start: 2024-08-20 | End: 2024-08-20 | Stop reason: HOSPADM

## 2024-08-20 RX ADMIN — FENTANYL CITRATE 50 MCG: 50 INJECTION, SOLUTION INTRAMUSCULAR; INTRAVENOUS at 08:08

## 2024-08-20 RX ADMIN — SODIUM CHLORIDE, SODIUM GLUCONATE, SODIUM ACETATE, POTASSIUM CHLORIDE, MAGNESIUM CHLORIDE, SODIUM PHOSPHATE, DIBASIC, AND POTASSIUM PHOSPHATE: .53; .5; .37; .037; .03; .012; .00082 INJECTION, SOLUTION INTRAVENOUS at 07:08

## 2024-08-20 RX ADMIN — EPHEDRINE SULFATE 10 MG: 50 INJECTION, SOLUTION INTRAMUSCULAR; INTRAVENOUS; SUBCUTANEOUS at 08:08

## 2024-08-20 RX ADMIN — MIDAZOLAM HYDROCHLORIDE 2 MG: 1 INJECTION INTRAMUSCULAR; INTRAVENOUS at 08:08

## 2024-08-20 RX ADMIN — EPHEDRINE SULFATE 10 MG: 50 INJECTION, SOLUTION INTRAMUSCULAR; INTRAVENOUS; SUBCUTANEOUS at 09:08

## 2024-08-20 RX ADMIN — LIDOCAINE HYDROCHLORIDE 100 MG: 20 INJECTION, SOLUTION INTRAVENOUS at 08:08

## 2024-08-20 RX ADMIN — BUPIVACAINE 20 ML: 13.3 INJECTION, SUSPENSION, LIPOSOMAL INFILTRATION at 08:08

## 2024-08-20 RX ADMIN — MIDAZOLAM HYDROCHLORIDE 1 MG: 1 INJECTION INTRAMUSCULAR; INTRAVENOUS at 08:08

## 2024-08-20 RX ADMIN — SODIUM CHLORIDE, SODIUM GLUCONATE, SODIUM ACETATE, POTASSIUM CHLORIDE, MAGNESIUM CHLORIDE, SODIUM PHOSPHATE, DIBASIC, AND POTASSIUM PHOSPHATE: .53; .5; .37; .037; .03; .012; .00082 INJECTION, SOLUTION INTRAVENOUS at 08:08

## 2024-08-20 RX ADMIN — TRANEXAMIC ACID 1000 MG: 100 INJECTION, SOLUTION INTRAVENOUS at 08:08

## 2024-08-20 RX ADMIN — BUPIVACAINE HYDROCHLORIDE 1.6 ML: 7.5 INJECTION, SOLUTION EPIDURAL; RETROBULBAR at 08:08

## 2024-08-20 RX ADMIN — PROPOFOL 75 MCG/KG/MIN: 10 INJECTION, EMULSION INTRAVENOUS at 08:08

## 2024-08-20 RX ADMIN — EPHEDRINE SULFATE 15 MG: 50 INJECTION, SOLUTION INTRAMUSCULAR; INTRAVENOUS; SUBCUTANEOUS at 09:08

## 2024-08-20 RX ADMIN — ONDANSETRON 4 MG: 2 INJECTION INTRAMUSCULAR; INTRAVENOUS at 08:08

## 2024-08-20 RX ADMIN — CEFAZOLIN 2 G: 2 INJECTION, POWDER, FOR SOLUTION INTRAMUSCULAR; INTRAVENOUS at 08:08

## 2024-08-20 RX ADMIN — BUPIVACAINE HYDROCHLORIDE 10 ML: 5 INJECTION, SOLUTION EPIDURAL; INTRACAUDAL; PERINEURAL at 08:08

## 2024-08-20 RX ADMIN — MUPIROCIN 1 G: 20 OINTMENT TOPICAL at 07:08

## 2024-08-20 RX ADMIN — ROPIVACAINE HYDROCHLORIDE: 5 INJECTION, SOLUTION EPIDURAL; INFILTRATION; PERINEURAL at 08:08

## 2024-08-20 RX ADMIN — OXYCODONE 5 MG: 5 TABLET ORAL at 10:08

## 2024-08-20 RX ADMIN — DEXAMETHASONE SODIUM PHOSPHATE 4 MG: 4 INJECTION, SOLUTION INTRA-ARTICULAR; INTRALESIONAL; INTRAMUSCULAR; INTRAVENOUS; SOFT TISSUE at 08:08

## 2024-08-20 RX ADMIN — TRANEXAMIC ACID 1000 MG: 100 INJECTION, SOLUTION INTRAVENOUS at 09:08

## 2024-08-20 RX ADMIN — ACETAMINOPHEN 1000 MG: 500 TABLET ORAL at 07:08

## 2024-08-20 RX ADMIN — SODIUM CHLORIDE, SODIUM GLUCONATE, SODIUM ACETATE, POTASSIUM CHLORIDE, MAGNESIUM CHLORIDE, SODIUM PHOSPHATE, DIBASIC, AND POTASSIUM PHOSPHATE: .53; .5; .37; .037; .03; .012; .00082 INJECTION, SOLUTION INTRAVENOUS at 09:08

## 2024-08-20 NOTE — PLAN OF CARE
Patient awake, alert, oriented. Vital signs stable. Patient verbalizes readiness for discharge. Patient cleared for discharge by PT. Patient reports having all necessary DME. Discharge instructions reviewed with patient and patient's spouse. Patient and patient's spouse verbalized understanding. IV removed, catheter intact. Dressing to L knee clean, dry, and intact. All belongings returned to patient. Patient transferred to car via wheelchair. Safety maintained.

## 2024-08-20 NOTE — PLAN OF CARE
VSS. NAD. Resp E/U. Calm and cooperative. Speech appropriate. Tolerating clear liquids. Denies nausea. Pain controlled. IV patent. Dressing to Lt knee CDI. Ice applied. Due to void. Xray taken.  Family notified of patient status. All safety measures taken. Bed in low position. Bedrails up x2. Bed locked. Cleared by Anesthesia.

## 2024-08-20 NOTE — ANESTHESIA PREPROCEDURE EVALUATION
08/20/2024  Mignon Hardy is a 59 y.o., female.      Pre-op Assessment          Review of Systems  Cardiovascular:     Hypertension                                  Hypertension         Neurological:    Neuromuscular Disease,                                 Neuromuscular Disease       Physical Exam  General: Well nourished        Anesthesia Plan  Type of Anesthesia, risks & benefits discussed:    Anesthesia Type: Spinal  Intra-op Monitoring Plan: Standard ASA Monitors  Post Op Pain Control Plan: multimodal analgesia, peripheral nerve block and IV/PO Opioids PRN  Induction:  IV  Informed Consent: Informed consent signed with the Patient and all parties understand the risks and agree with anesthesia plan.  All questions answered.   ASA Score: 2  Day of Surgery Review of History & Physical: H&P Update referred to the surgeon/provider.    Ready For Surgery From Anesthesia Perspective.     .

## 2024-08-20 NOTE — OP NOTE
[facility]  Orthopedic Surgery  Operative Note    SUMMARY     Date of Procedure: 8/20/2024     Procedure: Procedure(s) (LRB):  ROBOTIC ARTHROPLASTY, KNEE, TOTAL (Left)       Surgeons and Role:     * Brian Austin MD - Primary    Assistant: Jacob SNYDER    Pre-Operative Diagnosis: Arthritis of knee [M17.10]  Pre-op testing [Z01.818]    Post-Operative Diagnosis: Post-Op Diagnosis Codes:     * Arthritis of knee [M17.10]     * Pre-op testing [Z01.818]    Anesthesia: Spinal    Complications: No    Estimated Blood Loss (EBL): 75 mL           Implants:   Implant Name Type Inv. Item Serial No.  Lot No. LRB No. Used Action   PIN BONE [id number] - [id number]  PIN BONE [id number]  [employer] TURNER. [id number] Left 1 Implanted and Explanted   PIN FIXATION BONE 140X3.2MM - [id number]  PIN FIXATION BONE 140X3.2MM  [employer] TURNER. [id number] Left 1 Implanted and Explanted   CEMENT BONE ANTIBIO SIMPLEX P - [id number]  CEMENT BONE ANTIBIO SIMPLEX P  [employer] TURNER. [id number] Left 1 Implanted   COMPONENT FEM CR MAYTE LEFT 5 - [id number]  COMPONENT FEM CR MAYTE LEFT 5  [employer] TURNER. [id number] Left 1 Implanted   PATELLA TRIATHLON 33X9 SYMTRC - [id number]  PATELLA TRIATHLON 33X9 SYMTRC  [employer] TURNER. [id number] Left 1 Implanted   PRIMARY TIBIAL BASE 5. - [id number]  PRIMARY TIBIAL BASE 5.  [employer] TURNER. [id number] Left 1 Implanted   INSERT CONVTNL POLYETH 9MM 5 - [id number]  INSERT CONVTNL POLYETH 9MM 5  [employer] TURNER. [id number] Left 1 Implanted       Tourniquet time: 48min at 300mmHg    Specimens:   Specimen (24h ago, onward)      None                    Condition: Good    Disposition: PACU - hemodynamically stable.    Attestation: I was present and scrubbed for the entire procedure.    INDICATIONS FOR THE PROCEDURE: A 59F with a history of chronic   Left knee arthritis, had failed all conservative measures including cortisone   injections, PT, viscosupplementation and activity  modification. After a long   discussion, the patient wished to proceed with the procedure above.     PROCEDURE IN DETAIL: Risks, benefits and alternatives of the procedure were   explained to the patient including, but not limited to damage to nerves,   arteries or blood vessels. Also explained risk of infection, stiffness, DVT, PE,   polyethylene wear as well as anesthetic complications including seizure, stroke,   heart attack and death, understood this and signed informed consent. The   patient's Left knee was marked prior to coming to the Operating Room. The patient was brought to the operating room, placed on the operating table in a supine position.A  formal timeout was done in which correct patient, procedure and op site were all   correctly identified and confirmed by the entire operating team.  2gm Ancef was given prior to surgical incision. Spinal anesthesia was induced. The patient'sLeft  lower extremity was prepped and   draped in normal sterile fashion. TheLeft  leg was exsanguinated with an   Esmarch. Tourniquet was inflated up 300 mmHg. Standard anterior approach to   the knee was made. Medial parapatellar arthrotomy was made, leaving about 1/8   inch of tissue for later repair. Proximal medial tibial release was performed,   making sure not to release any of the MCL. We then   everted the patella and reflexed the knee. Fat pad was excised as well as some   of the ACL. Soft tissue off the distal anterior femur was removed for   visualization, so as to help prevent notching.  We started off by placing our femoral pins.  Two pins were placed about 2 centimeters proximal and 1 centimeter anterior to the medial epicondyle.  We then retracted down to the level of the tibial tubercle.    Two pins were placed just short of bicortically in the tibia.  We then hooked up our arays to our pins.  We placed 2 checkpoints.  One in the tibia and 1 in the femur.  We then took the leg through range of motion.  We then  began by marking off our bony points.  We did this 1st on the femur and then on the tibia.  After this we did ligament balancing of the knee 1st extension and then in flexion by using some spoons and an osteotome.  We then adjusted our bone cuts on the computer and once we liked our plan began by making our cuts.  The [employer] robotic assisted cutting arm was then brought in and then we made our femoral cuts and then turned our attention to the tibia.  Tibial cuts were made.  All of bone was removed as well as excess soft tissue.  Posterior osteophytes removed as well.  We then began to trial.  We placed a size 5 femur and a size 5 tibia with a size 9 polyethylene.  We were within 1 millimeter between our medial and lateral compartments in both flexion and extension.        We turned our attention to patella, caliper was used on the patella where it   measured 24. We set guide at 15 and made our 9-mm cut for polyethylene component, 33 was selected. Then drill holes were placed and the patellar button was placed.   This had good tracking. No need for a lateral release and with no hand tests,   it stayed centered the whole time. We then marked our tibial rotation. We then drilled our femoral lugs.  We then   removed everything except the size 5 tibial tray. We then checked our tibial tray   with a drop gian again and then marked our rotation and pinned it into place then   drilled, and then punched for our keel. Robot and pins were all removed. We then started preparing final   components on the back table. Anterior, medial and lateral structures were injected with our local   Cocktail. Bony surfaces   copiously irrigated with Pulsavac and then thoroughly dried. Once the cement   was the appropriate consistency, first the tibia and then the femur were   cemented into place, removing excess cement. A 9 trial was placed. The knee   was held in compression and then the patella was placed. Cement was allowed to   cure. Once  the cement was cured, we then removed excess cement. Again trialed   one final time, again seeing a 9. We then tapped into place the   final 9 meniscal bearing into place. After this was done, we then did our   diluted iodine soak for 3 minutes and then proceeded with closing.  Arthrotomy was closed using our StrataFix.  Patient had a little more lateralize patellar tilt that I like so a small lateral release was performed.  Subcutaneous tissue was closed using 2-0 Vicryl and skin was using a running 3-0   StrataFix and Dermabond.Instrument, sponge, and needle counts were correct prior to wound closure and at the conclusion of the case.  Sterile dressing was applied.   They were extubated, awakened and transferred from the Operating Room to the   Recovery Room in stable condition.

## 2024-08-20 NOTE — ANESTHESIA PROCEDURE NOTES
Peripheral Block    Patient location during procedure: pre-op   Block not for primary anesthetic.  Reason for block: at surgeon's request and post-op pain management   Post-op Pain Location: left knee   Start time: 8/20/2024 8:00 AM  Timeout: 8/20/2024 8:00 AM   End time: 8/20/2024 8:05 AM    Staffing  Authorizing Provider: Merle Horvath MD  Performing Provider: Merle Horvath MD    Staffing  Performed by: Merle Horvath MD  Authorized by: Merle Horvath MD    Preanesthetic Checklist  Completed: patient identified, IV checked, site marked, risks and benefits discussed, surgical consent, monitors and equipment checked, pre-op evaluation and timeout performed  Peripheral Block  Patient position: supine  Prep: ChloraPrep  Patient monitoring: heart rate, cardiac monitor, continuous pulse ox, continuous capnometry and frequent blood pressure checks  Block type: adductor canal  Laterality: left  Injection technique: single shot  Needle  Needle type: Stimuplex   Needle gauge: 21 G  Needle length: 4 in  Needle localization: anatomical landmarks and ultrasound guidance   -ultrasound image captured on disc.  Assessment  Injection assessment: negative aspiration, negative parasthesia and local visualized surrounding nerve  Paresthesia pain: none  Heart rate change: no  Slow fractionated injection: yes  Pain Tolerance: comfortable throughout block and no complaints  Medications:    Medications: bupivacaine (pf) (MARCAINE) injection 0.5% - Perineural   10 mL - 8/20/2024 8:05:00 AM    Additional Notes  VSS.  [facility] RN monitoring vitals throughout procedure.  Patient tolerated procedure well.     Exparel 20mL

## 2024-08-20 NOTE — DISCHARGE SUMMARY
[facility]  Discharge Note  Short Stay    Procedure(s) (LRB):  ROBOTIC ARTHROPLASTY, KNEE, TOTAL (Left)      OUTCOME: Patient tolerated treatment/procedure well without complication and is now ready for discharge.    DISPOSITION: Home or Self Care    FINAL DIAGNOSIS:  <principal problem not specified>    FOLLOWUP: In clinic    DISCHARGE INSTRUCTIONS:    Discharge Procedure Orders   Diet general     Leave dressing on - Keep it clean, dry, and intact until clinic visit     Change dressing (specify)   Order Comments: Dressing change: If dressing loses its seal, change daily.     Call MD for:  temperature >100.4     Call MD for:  persistent nausea and vomiting     Call MD for:  severe uncontrolled pain     Call MD for:  difficulty breathing, headache or visual disturbances     Call MD for:  redness, tenderness, or signs of infection (pain, swelling, redness, odor or green/yellow discharge around incision site)     Call MD for:  hives     Call MD for:  persistent dizziness or light-headedness     Call MD for:  extreme fatigue     Keep surgical extremity elevated     Ice to affected area   Order Comments: using barrier between ice and skin (specify duration&frequency)     Weight bearing as tolerated     Activity as tolerated     Shower on day dressing removed (No bath)        TIME SPENT ON DISCHARGE: 5 minutes

## 2024-08-20 NOTE — PLAN OF CARE
Per LESLI Jules RN, patient's first outpatient physical therapy appointment is at [facility] tomorrow at 1:00 PM. Educated patient and patient's spouse. Patient and patient's spouse verbalized understanding. See AVS.

## 2024-08-20 NOTE — TRANSFER OF CARE
"Anesthesia Transfer of Care Note    Patient: Mignon Hardy    Procedure(s) Performed: Procedure(s) (LRB):  ROBOTIC ARTHROPLASTY, KNEE, TOTAL (Left)    Patient location: PACU    Anesthesia Type: general    Transport from OR: Transported from OR on 2-3 L/min O2 by NC with adequate spontaneous ventilation    Post pain: adequate analgesia    Post assessment: no apparent anesthetic complications    Post vital signs: stable    Level of consciousness: awake    Nausea/Vomiting: no nausea/vomiting    Complications: none    Transfer of care protocol was followed      Last vitals: Visit Vitals  /69 (BP Location: Right arm, Patient Position: Lying)   Pulse 60   Temp 36.5 °C (97.7 °F) (Skin)   Resp 17   Ht 5' 9" (1.753 m)   Wt 111.1 kg (245 lb)   SpO2 98%   Breastfeeding No   BMI 36.18 kg/m²     "

## 2024-08-20 NOTE — ANESTHESIA PROCEDURE NOTES
Spinal    Diagnosis: OA  Patient location during procedure: OR  Start time: 8/20/2024 8:14 AM  Timeout: 8/20/2024 8:13 AM  End time: 8/20/2024 8:18 AM    Staffing  Authorizing Provider: Merle Horvath MD  Performing Provider: Merle Horvath MD    Staffing  Performed by: Merle Horvath MD  Authorized by: Merle Horvath MD    Preanesthetic Checklist  Completed: patient identified, IV checked, site marked, risks and benefits discussed, surgical consent, monitors and equipment checked, pre-op evaluation and timeout performed  Spinal Block  Patient position: sitting  Prep: ChloraPrep  Patient monitoring: heart rate, cardiac monitor, continuous pulse ox, continuous capnometry and frequent blood pressure checks  Approach: midline  Location: L3-4  Injection technique: single shot  CSF Fluid: clear free-flowing CSF  Needle  Needle type: Lei   Needle gauge: 25 G  Needle length: 3.5 in  Additional Documentation: incremental injection, negative aspiration for heme and no paresthesia on injection  Needle localization: anatomical landmarks  Assessment  Sensory level: T10   Dermatomal levels determined by alcohol wipe  Ease of block: easy  Patient's tolerance of the procedure: comfortable throughout block and no complaints  Medications:    Medications: bupivacaine (pf) (MARCAINE) injection 0.75% - Intraspinal   1.6 mL - 8/20/2024 8:18:00 AM

## 2024-08-20 NOTE — PLAN OF CARE
1148 PT at BS for eval.    1157 PT in hallway with ambulating pt with walker    1208 PT with pt ambulating with walker on steps    1215 PT with pt ambulating with walker back to room

## 2024-08-20 NOTE — PT/OT/SLP EVAL
Physical Therapy Evaluation and Discharge Note    Patient Name:  Mignon Hardy   MRN:  [id number]    Recommendations:     Discharge Recommendations: Low Intensity Therapy  Discharge Equipment Recommendations: none (has RW)   Barriers to discharge: None    Assessment:     Mignon Hardy is a 59 y.o. female admitted with a medical diagnosis of <principal problem not specified>. .  Pt seen at post 06 and scheduled for discharge today. Pt alert/pleasant. Completed thera ex in supine with good execution.  Pt requiring min assist to sit EOB and to stand. Pt ambulated 250ft with 1 seated rest and another person following with chair. Pt completed stair training 5 ascending and descending via forward technique min assist. Bathroom use with unsuccessful voiding. Pt ambulated back to room and up in chair. Pt and family provided with gait belt for discharge home. Educated and demonstrated it's use.  Pt will attend OP PT services    Recent Surgery: Procedure(s) (LRB):  ROBOTIC ARTHROPLASTY, KNEE, TOTAL (Left) Day of Surgery    Plan:     During this hospitalization, patient does not require further acute PT services.  Please re-consult if situation changes.      Subjective   Pt denies pain. Pt stated that her RK is bad too and will need surgery soon  Spouse at bedside and supportive  Pt stated had gastric/weight loss surgery and lost ~120lbs    Chief Complaint: a little numb  Patient/Family Comments/goals: get well  Pain/Comfort:  Pain Rating 1: 0/10    Patients cultural, spiritual, Catholic conflicts given the current situation:      Living Environment:  Home with spouse  Has 5 steps to enter  Has 10 dogs- educated on safety  Prior to admission, patients level of function was ambulatory and indep.  Equipment used at home: none.  DME owned (not currently used): rolling walker.  Upon discharge, patient will have assistance from spouse.    Objective:     Communicated with nurse Olmedo prior to session.  Patient found  HOB elevated with   upon PT entry to room.    General Precautions: Standard, fall    Orthopedic Precautions:LLE weight bearing as tolerated   Braces: N/A  Respiratory Status: Room air    Exams:  Postural Exam:  Patient presented with the following abnormalities:    -       Rounded shoulders  -       Forward head  -       BMI 36.18  RLE ROM: WFL  RLE Strength: WFL  LLE ROM: Deficits: Lk flexion ~90*  LLE Strength: Deficits: 3/5    Functional Mobility:  Bed Mobility:     Scooting: minimum assistance  Supine to Sit: minimum assistance  Transfers:     Sit to Stand:  minimum assistance with rolling walker  Bed to Chair: minimum assistance with  rolling walker  using  Stand Pivot  Toilet Transfer: minimum assistance with  rolling walker  using  Stand Pivot  Gait: 250ft with RW min assist and another person following with chair  Stairs:  Pt ascended/descended 5 stair(s) with No Assistive Device with bilateral handrails with Minimal Assistance.     AM-PAC 6 CLICK MOBILITY  Total Score:18       Treatment and Education:  Patient was educated on the importance of OOB activity and functional mobility to negate negative effects of prolonged bed rest during hospitalization, safe transfers and ambulation, and D/C planning   Thera ex with AP,QS/GS,SLR,HS  Gait with own RW-adjusted to height  Spouse present entire session and educated on safe management  OOB chair post PT    AM-PAC 6 CLICK MOBILITY  Total Score:18     Patient left up in chair with all lines intact, call button in reach, nurse delores notified, and spouse present.    GOALS:   Multidisciplinary Problems       Physical Therapy Goals       Not on file                    History:     Past Medical History:   Diagnosis Date    Arthritis     Bilateral Knee Arthritis    Chronic bilateral low back pain with left-sided sciatica 02/10/2021    Digestive disorder     Essential tremor 02/10/2021    Heart disease     Hypertension     Obesity     Thyroid disease     NODULES        Past Surgical History:   Procedure Laterality Date    CARPAL TUNNEL RELEASE Right 05/09/2023    Procedure: Right carpal tunnel release;  Surgeon: Kash Sahni MD;  Location: [facility];  Service: Orthopedics;  Laterality: Right;    CARPAL TUNNEL RELEASE Left 08/03/2023    Procedure: Left carpal tunnel release;  Surgeon: Kash Sahni MD;  Location: [facility];  Service: Orthopedics;  Laterality: Left;    EPIDURAL STEROID INJECTION INTO CERVICAL SPINE N/A 06/07/2023    Procedure: Injection-steroid-epidural-cervical C7-T1;  Surgeon: Aileen Ocampo DO;  Location: [facility];  Service: Pain Management;  Laterality: N/A;    EPIDURAL STEROID INJECTION INTO CERVICAL SPINE N/A 8/14/2024    Procedure: Injection-steroid-epidural-cervical  C7/T1;  Surgeon: Iván Qureshi MD;  Location: [facility];  Service: Pain Management;  Laterality: N/A;    ESOPHAGEAL DILATION N/A 05/20/2022    Procedure: DILATION, ESOPHAGUS;  Surgeon: Jose Aguilera MD;  Location: [facility];  Service: Endoscopy;  Laterality: N/A;  [place]    ESOPHAGOGASTRODUODENOSCOPY N/A 05/20/2022    Procedure: EGD (ESOPHAGOGASTRODUODENOSCOPY);  Surgeon: Jose Aguilera MD;  Location: [facility];  Service: Endoscopy;  Laterality: N/A;    HYSTEROSCOPY WITH DILATION AND CURETTAGE OF UTERUS N/A 10/20/2023    Procedure: HYSTEROSCOPY, WITH DILATION AND CURETTAGE OF UTERUS;  Surgeon: Noa Farias MD;  Location[facility];  Service: OB/GYN;  Laterality: N/A;    REMOVAL-MASS      of thyroid    TUBAL LIGATION         Time Tracking:     PT Received On: 08/20/24  PT Start Time: 1136     PT Stop Time: 1234  PT Total Time (min): 58 min     Billable Minutes: Evaluation 10, Gait Training 30, and Therapeutic Exercise 18      08/20/2024

## 2024-08-20 NOTE — ANESTHESIA POSTPROCEDURE EVALUATION
Anesthesia Post Evaluation    Patient: Mignon Hardy    Procedure(s) Performed: Procedure(s) (LRB):  ROBOTIC ARTHROPLASTY, KNEE, TOTAL (Left)    Final Anesthesia Type: spinal      Patient location during evaluation: PACU  Patient participation: Yes- Able to Participate  Level of consciousness: awake and alert  Post-procedure vital signs: reviewed and stable  Pain management: adequate  Airway patency: patent    PONV status at discharge: No PONV  Anesthetic complications: no      Cardiovascular status: blood pressure returned to baseline  Respiratory status: unassisted and room air  Hydration status: euvolemic  Follow-up not needed.              Vitals Value Taken Time   /58 08/20/24 1125   Temp 36.8 °C (98.2 °F) 08/20/24 1045   Pulse 71 08/20/24 1125   Resp 16 08/20/24 1125   SpO2 96 % 08/20/24 1125         Event Time   Out of Recovery 10:58:00         Pain/Griffin Score: Pain Rating Prior to Med Admin: 2 (8/20/2024 10:12 AM)  Griffin Score: 10 (8/20/2024 10:45 AM)  Modified Griffin Score: 19 (8/20/2024 12:50 PM)

## 2024-08-20 NOTE — H&P
Past Medical History:   Diagnosis Date    Arthritis       Bilateral Knee Arthritis    Chronic bilateral low back pain with left-sided sciatica 02/10/2021    Essential tremor 02/10/2021    Heart disease      Hypertension      Obesity                 Past Surgical History:   Procedure Laterality Date    CARPAL TUNNEL RELEASE Right 05/09/2023     Procedure: Right carpal tunnel release;  Surgeon: Kash Sahni MD;  Location: [facility] OR;  Service: Orthopedics;  Laterality: Right;    CARPAL TUNNEL RELEASE Left 08/03/2023     Procedure: Left carpal tunnel release;  Surgeon: Kash Sahni MD;  Location: [facility] OR;  Service: Orthopedics;  Laterality: Left;    EPIDURAL STEROID INJECTION INTO CERVICAL SPINE N/A 06/07/2023     Procedure: Injection-steroid-epidural-cervical C7-T1;  Surgeon: Aileen Ocampo DO;  Location: [facility] PAIN MANAGEMENT;  Service: Pain Management;  Laterality: N/A;    ESOPHAGEAL DILATION N/A 05/20/2022     Procedure: DILATION, ESOPHAGUS;  Surgeon: Jose Aguilera MD;  Location: [facility];  Service: Endoscopy;  Laterality: N/A;  [place]    ESOPHAGOGASTRODUODENOSCOPY N/A 05/20/2022     Procedure: EGD (ESOPHAGOGASTRODUODENOSCOPY);  Surgeon: Jose Aguilera MD;  Location: [facility];  Service: Endoscopy;  Laterality: N/A;    HYSTEROSCOPY WITH DILATION AND CURETTAGE OF UTERUS N/A 10/20/2023     Procedure: HYSTEROSCOPY, WITH DILATION AND CURETTAGE OF UTERUS;  Surgeon: Noa Farias MD;  Location: [facility];  Service: OB/GYN;  Laterality: N/A;    REMOVAL-MASS         of thyroid    TUBAL LIGATION                  Current Outpatient Medications   Medication Sig    ergocalciferol (ERGOCALCIFEROL) 50,000 unit Cap Take 50,000 Units by mouth as needed.    famotidine (PEPCID) 20 MG tablet Take 20 mg by mouth 2 (two) times daily.    potassium chloride SA (K-DUR,KLOR-CON) 20 MEQ tablet potassium chloride ER 20 mEq tablet,extended release(part/cryst)   TAKE ONE TABLET BY MOUTH ONCE DAILY           Current Facility-Administered Medications   Medication    famotidine tablet 20 mg          Facility-Administered Medications Ordered in Other Visits   Medication    dextrose 50% injection 12.5 g    dextrose 50% injection 25 g    insulin regular injection 0-8 Units    lactated ringers infusion    LIDOcaine (PF) 10 mg/ml (1%) injection 10 mg         Review of patient's allergies indicates:  No Known Allergies            Family History   Problem Relation Name Age of Onset    Thyroid cancer Mother        Cancer Mother             breast    Dementia Mother        Heart disease Father        Tremor Father        Pacemaker/defibrilator Father        Thyroid cancer Sister        Ovarian cancer Maternal Aunt             20's    Cancer Maternal Grandmother        Colon cancer Neg Hx             Social History            Socioeconomic History    Marital status:    Tobacco Use    Smoking status: Never    Smokeless tobacco: Never   Substance and Sexual Activity    Alcohol use: Never    Drug use: Never    Sexual activity: Yes       Partners: Male       Birth control/protection: None      Social Determinants of Health           Financial Resource Strain: High Risk (3/25/2024)     Overall Financial Resource Strain (CARDIA)      Difficulty of Paying Living Expenses: Hard   Food Insecurity: Food Insecurity Present (3/25/2024)     Hunger Vital Sign      Worried About Running Out of Food in the Last Year: Often true      Ran Out of Food in the Last Year: Often true   Transportation Needs: No Transportation Needs (3/25/2024)     PRAPARE - Transportation      Lack of Transportation (Medical): No      Lack of Transportation (Non-Medical): No   Physical Activity: Insufficiently Active (3/25/2024)     Exercise Vital Sign      Days of Exercise per Week: 1 day      Minutes of Exercise per Session: 10 min   Stress: Stress Concern Present (3/25/2024)     [employer] - Occupational Stress Questionnaire       Feeling of Stress : To some extent   Housing Stability: High Risk (3/25/2024)     Housing Stability Vital Sign      Unable to Pay for Housing in the Last Year: Yes      Number of Places Lived in the Last Year: 1      Unstable Housing in the Last Year: No         Chief Complaint:       Chief Complaint   Patient presents with    Right Knee - Pain    Left Knee - Pain         History of present illness:  This is a 58-year-old female seen for chronic bilateral knee pain.  Left greater than right.  Patient has had pain for 3-4 years now.  Patient has been taking ibuprofen as well as getting periodic injections.  Patient just recently switched doctors.  She has had both cortisone as well as viscosupplementation.  Viscosupplementation does not help.  Pain is a 9/10.  We tried Zilretta and it did not really help much either.  She has lost a 115 pounds now.  Left knee starting to really affect her quality of life.        Review of Systems:  Musculoskeletal:  See HPI           Physical Examination:     Vital Signs:    There were no vitals filed for this visit.        Body mass index is 40.47 kg/m².     This a well-developed, well nourished patient in no acute distress.  They are alert and oriented and cooperative to examination.  Pt. walks without an antalgic gait.       Examination of bilateral knees shows no rashes or erythema. There are no masses ecchymosis or effusion. Patient has full range of motion from 0-130°. Patient is nontender to palpation over lateral joint line and moderately tender to palpation over the medial joint line. Knee is stable to varus and valgus stress. 5 out of 5 motor strength. Palpable distal pulses. Intact light touch sensation. Negative Patellofemoral crepitus     Heart is regular rate without obvious murmurs   Normal respiratory effort without audible wheezing  Abdomen is soft and nontender         X-rays:  X-rays of both knees are ordered a reviewed which show severe medial joint space  narrowing bilaterally     Assessment::  Severe bilateral varus knee arthritis     Plan:  I reviewed the findings with her today.  Patient is ready to proceed with left knee replacement.  Plan is for left robotic assisted total knee arthroplasty.  Risks, benefits, and alternatives to the procedure were explained to the patient including but not limited to damage to nerves, arteries, blood vessels, bones, tendons, ligaments, stiffness, instability, infection, permanent limb dysfunction, DVT, PE, as well as general anesthetic complications including seizure, stroke, heart attack and even death.  Increased risk of complication due to obesity including infection and implant failure.  The patient understood these risks and wished to proceed and signed the informed consent.      The mobility limitation can not be sufficiently resolved by the use of a cane.  Patient's functional mobility deficit can be sufficiently resolved with the use of a rolling walker or a walker.  Patient has mobility limitation significantly impairs their ability to participate in 1 or more activities of daily living.  The use of a walker or rolling walker we will significantly improve the patient's ability to participate in activities of daily living and the patient will use it on a regular basis in the home.           This note was created using [employer] voice recognition software that occasionally misinterpreted phrases or words.     Consult note is delivered via Epic messaging service.

## 2024-08-20 NOTE — DISCHARGE INSTRUCTIONS
"Discharge Instructions: After Your Surgery/Procedure  Youve just had surgery. During surgery you were given medicine called anesthesia to keep you relaxed and free of pain. After surgery you may have some pain or nausea. This is common. Here are some tips for feeling better and getting well after surgery.     Stay on schedule with your medication.   Going home  Your doctor or nurse will show you how to take care of yourself when you go home. He or she will also answer your questions. Have an adult family member or friend drive you home.      For your safety we recommend these precaution for the first 24 hours after your procedure:  Do not drive or use heavy equipment.  Do not make important decisions or sign legal papers.  Do not drink alcohol.  Have someone stay with you, if needed. He or she can watch for problems and help keep you safe.  Your concentration, balance, coordination, and judgement may be impaired for many hours after anesthesia.  Use caution when ambulating or standing up.     You may feel weak and "washed out" after anesthesia and surgery.      Subtle residual effects of general anesthesia or sedation with regional / local anesthesia can last more than 24 hours.  Rest for the remainder of the day or longer if your Doctor/Surgeon has advised you to do so.  Although you may feel normal within the first 24 hours, your reflexes and mental ability may be impaired without you realizing it.  You may feel dizzy, lightheaded or sleepy for 24 hours or longer.      Be sure to go to all follow-up visits with your doctor. And rest after your surgery for as long as your doctor tells you to.  Coping with pain  If you have pain after surgery, pain medicine will help you feel better. Take it as told, before pain becomes severe. Also, ask your doctor or pharmacist about other ways to control pain. This might be with heat, ice, or relaxation. And follow any other instructions your surgeon or nurse gives you.  Tips " for taking pain medicine  To get the best relief possible, remember these points:  Pain medicines can upset your stomach. Taking them with a little food may help.  Most pain relievers taken by mouth need at least 20 to 30 minutes to start to work.  Taking medicine on a schedule can help you remember to take it. Try to time your medicine so that you can take it before starting an activity. This might be before you get dressed, go for a walk, or sit down for dinner.  Constipation is a common side effect of pain medicines. Call your doctor before taking any medicines such as laxatives or stool softeners to help ease constipation. Also ask if you should skip any foods. Drinking lots of fluids and eating foods such as fruits and vegetables that are high in fiber can also help. Remember, do not take laxatives unless your surgeon has prescribed them.  Drinking alcohol and taking pain medicine can cause dizziness and slow your breathing. It can even be deadly. Do not drink alcohol while taking pain medicine.  Pain medicine can make you react more slowly to things. Do not drive or run machinery while taking pain medicine.  Your health care provider may tell you to take acetaminophen to help ease your pain. Ask him or her how much you are supposed to take each day. Acetaminophen or other pain relievers may interact with your prescription medicines or other over-the-counter (OTC) drugs. Some prescription medicines have acetaminophen and other ingredients. Using both prescription and OTC acetaminophen for pain can cause you to overdose. Read the labels on your OTC medicines with care. This will help you to clearly know the list of ingredients, how much to take, and any warnings. It may also help you not take too much acetaminophen. If you have questions or do not understand the information, ask your pharmacist or health care provider to explain it to you before you take the OTC medicine.  Managing nausea  Some people have an  upset stomach after surgery. This is often because of anesthesia, pain, or pain medicine, or the stress of surgery. These tips will help you handle nausea and eat healthy foods as you get better. If you were on a special food plan before surgery, ask your doctor if you should follow it while you get better. These tips may help:  Do not push yourself to eat. Your body will tell you when to eat and how much.  Start off with clear liquids and soup. They are easier to digest.  Next try semi-solid foods, such as mashed potatoes, applesauce, and gelatin, as you feel ready.  Slowly move to solid foods. Dont eat fatty, rich, or spicy foods at first.  Do not force yourself to have 3 large meals a day. Instead eat smaller amounts more often.  Take pain medicines with a small amount of solid food, such as crackers or toast, to avoid nausea.     Call your surgeon if  You still have pain an hour after taking medicine. The medicine may not be strong enough.  You feel too sleepy, dizzy, or groggy. The medicine may be too strong.  You have side effects like nausea, vomiting, or skin changes, such as rash, itching, or hives.       If you have obstructive sleep apnea  You were given anesthesia medicine during surgery to keep you comfortable and free of pain. After surgery, you may have more apnea spells because of this medicine and other medicines you were given. The spells may last longer than usual.   At home:  Keep using the continuous positive airway pressure (CPAP) device when you sleep. Unless your health care provider tells you not to, use it when you sleep, day or night. CPAP is a common device used to treat obstructive sleep apnea.  Talk with your provider before taking any pain medicine, muscle relaxants, or sedatives. Your provider will tell you about the possible dangers of taking these medicines.  © 5182-9941 [employer]. [address] [place] [address], [place] [address]. All rights reserved. This information is  not intended as a substitute for professional medical care. Always follow your healthcare professional's instructions.          Using an Incentive Spirometer    An incentive spirometer is a device that helps you do deep breathing exercises. These exercises expand your lungs, aid in circulation, and help prevent pneumonia. Deep breathing exercises also help you breathe better and improve the function of your lungs by:  Keeping your lungs clear  Strengthening your breathing muscles  Helping prevent respiratory complications or problems  The incentive spirometer gives you a way to take an active part in recover. A nurse or therapist will teach you breathing exercises. To do these exercises, you will breathe in through your mouth and not your nose. The incentive spirometer only works correctly if you breathe in through your mouth.  Steps to clear lungs  Step 1. Exhale normally. Then, inhale normally.  Relax and breathe out.  Step 2. Place your lips tightly around the mouthpiece.  Make sure the device is upright and not tilted.  Step 3. Inhale as much air as you can through the mouthpiece (don't breath through your nose).  Inhale slowly and deeply.  Hold your breath long enough to keep the balls or disk raised for at least 3 to 5 seconds, or as instructed by your healthcare provider.  Some spirometers have an indicator to let you know that you are breathing in too fast. If the indicator goes off, breathe in more slowly.  Step 4. Repeat the exercise regularly.  Do this exercise every hour while you're awake, or as instructed by your healthcare provider.  If you were taught deep breathing and coughing exercises, do them regularly as instructed by your healthcare provider.           Post op instructions for prevention of DVT  What is deep vein thrombosis?  Deep vein thrombosis (DVT) is the medical term for blood clots in the deep veins of the leg.  These blood clots can be dangerous.  A DVT can block a blood vessel and keep  blood from getting where it needs to go.  Another problem is that the clot can travel to other parts of the body such as the lungs.  A clot that travels to the lungs is called a pulmonary embolus (PE) and can cause serious problems with breathing which can lead to death.  Am I at risk for DVT/PE?  If you are not very active, you are at risk of DVT.  Anyone confined to bed, sitting for long periods of time, recovering from surgery, etc. increases the risk of DVT.  Other risk factors are cancer diagnosis, certain medications, estrogen replacement in any form,older age, obesity, pregnancy, smoking, history of clotting disorders, and dehydration.  How will I know if I have a DVT?  Swelling in the lower leg  Pain  Warmth, redness, hardness or bulging of the vein  If you have any of these symptoms, call your doctors office right away.  Some people will not have any symptoms until the clot moves to the lungs.  What are the symptoms of a PE?  Panting, shortness of breath, or trouble breathing  Sharp, knife-like chest pain when you breathe  Coughing or coughing up blood  Rapid heartbeat  If you have any of these symptoms or get worse quickly, call 911 for emergency treatment.  How can I prevent a DVT?  Avoid long periods of inactivity and dont cross your legs--get up and walk around every hour or so.  Stay active--walking after surgery is highly encouraged.  This means you should get out of the house and walk in the neighborhood.  Going up and down stairs will not impair healing (unless advised against such activity by your doctor).    Drink plenty of noncaffeinated, nonalcoholic fluids each day to prevent dehydration.  Wear special support stockings, if they have been advised by your doctor.  If you travel, stop at least once an hour and walk around.  Avoid smoking (assistance with stopping is available through your healthcare provider)  Always notify your doctor if you are not able to follow the post operative  instructions that are given to you at the time of discharge.  It may be necessary to prescribe one of the medications available to prevent DVT.          Exparel(bupivacaine) has been injected to provide approximately 72 hours of reduced pain after your surgery.  Do not remove the bracelet for five days.  Report to your doctor as soon as possible if you experience any of the following:   Restlessness   Anxiety   Speech problems    Lightheadedness   Numbness and tingling of the mouth and lips   Seizures    Metallic taste   Blurred vision   Tremors    Twitching   Depression   Extreme drowsiness  Avoid additional use of local anesthetics (such as dental procedures) for five days (96 hours).          We hope your stay was comfortable as you heal now, mend and rest.    For we have enjoyed taking care of you by giving your our best.    And as you get better, by regaining your health and strength;   We count it as a privilege to have served you and hope your time at [facility] was well spent.      Thank  You!!!

## 2024-08-21 VITALS
BODY MASS INDEX: 36.29 KG/M2 | WEIGHT: 245 LBS | TEMPERATURE: 98 F | RESPIRATION RATE: 16 BRPM | DIASTOLIC BLOOD PRESSURE: 58 MMHG | OXYGEN SATURATION: 96 % | SYSTOLIC BLOOD PRESSURE: 123 MMHG | HEIGHT: 69 IN | HEART RATE: 71 BPM

## 2024-08-27 ENCOUNTER — APPOINTMENT (OUTPATIENT)
Dept: GENERAL RADIOLOGY | Age: 59
End: 2024-08-27

## 2024-08-27 ENCOUNTER — HOSPITAL ENCOUNTER (EMERGENCY)
Age: 59
Discharge: HOME | End: 2024-08-27
Attending: EMERGENCY MEDICINE

## 2024-08-27 VITALS
TEMPERATURE: 98.7 F | DIASTOLIC BLOOD PRESSURE: 80 MMHG | HEART RATE: 79 BPM | SYSTOLIC BLOOD PRESSURE: 116 MMHG | RESPIRATION RATE: 18 BRPM | OXYGEN SATURATION: 97 %

## 2024-08-27 DIAGNOSIS — R31.29 OTHER MICROSCOPIC HEMATURIA: ICD-10-CM

## 2024-08-27 DIAGNOSIS — M79.10 MYALGIA: Primary | ICD-10-CM

## 2024-08-27 LAB
ANION GAP SERPL CALC-SCNC: 13 MMOL/L (ref 7–19)
APPEARANCE UR: ABNORMAL
BASOPHILS # BLD: 0 K/MCL (ref 0–0.3)
BASOPHILS NFR BLD: 1 %
BILIRUB UR QL STRIP: NEGATIVE
BUN SERPL-MCNC: 13 MG/DL (ref 6–20)
BUN/CREAT SERPL: 13 (ref 7–25)
CALCIUM SERPL-MCNC: 9.4 MG/DL (ref 8.4–10.2)
CHLORIDE SERPL-SCNC: 98 MMOL/L (ref 97–110)
CO2 SERPL-SCNC: 30 MMOL/L (ref 21–32)
COLOR UR: YELLOW
CREAT SERPL-MCNC: 0.97 MG/DL (ref 0.51–0.95)
DEPRECATED RDW RBC: 43.7 FL (ref 39–50)
EGFRCR SERPLBLD CKD-EPI 2021: 67 ML/MIN/{1.73_M2}
EOSINOPHIL # BLD: 0.1 K/MCL (ref 0–0.5)
EOSINOPHIL NFR BLD: 2 %
ERYTHROCYTE [DISTWIDTH] IN BLOOD: 13.2 % (ref 11–15)
FASTING DURATION TIME PATIENT: ABNORMAL H
FLUAV RNA RESP QL NAA+PROBE: NOT DETECTED
FLUBV RNA RESP QL NAA+PROBE: NOT DETECTED
GLUCOSE SERPL-MCNC: 102 MG/DL (ref 70–99)
GLUCOSE UR STRIP-MCNC: NEGATIVE MG/DL
HCT VFR BLD CALC: 42.8 % (ref 36–46.5)
HGB BLD-MCNC: 14.6 G/DL (ref 12–15.5)
HGB UR QL STRIP: ABNORMAL
IMM GRANULOCYTES # BLD AUTO: 0 K/MCL (ref 0–0.2)
IMM GRANULOCYTES # BLD: 0 %
KETONES UR STRIP-MCNC: NEGATIVE MG/DL
LEUKOCYTE ESTERASE UR QL STRIP: NEGATIVE
LYMPHOCYTES # BLD: 2.1 K/MCL (ref 1–4)
LYMPHOCYTES NFR BLD: 32 %
MAGNESIUM SERPL-MCNC: 2 MG/DL (ref 1.7–2.4)
MCH RBC QN AUTO: 30.7 PG (ref 26–34)
MCHC RBC AUTO-ENTMCNC: 34.1 G/DL (ref 32–36.5)
MCV RBC AUTO: 89.9 FL (ref 78–100)
MONOCYTES # BLD: 0.8 K/MCL (ref 0.3–0.9)
MONOCYTES NFR BLD: 12 %
NEUTROPHILS # BLD: 3.5 K/MCL (ref 1.8–7.7)
NEUTROPHILS NFR BLD: 53 %
NITRITE UR QL STRIP: NEGATIVE
NRBC BLD MANUAL-RTO: 0 /100 WBC
PH UR STRIP: 5.5 UNITS (ref 5–7)
PLATELET # BLD AUTO: 197 K/MCL (ref 140–450)
POTASSIUM SERPL-SCNC: 3.7 MMOL/L (ref 3.4–5.1)
PROT UR STRIP-MCNC: NEGATIVE MG/DL
RBC # BLD: 4.76 MIL/MCL (ref 4–5.2)
RSV AG NPH QL IA.RAPID: NOT DETECTED
SARS-COV-2 RNA RESP QL NAA+PROBE: NOT DETECTED
SERVICE CMNT-IMP: NORMAL
SERVICE CMNT-IMP: NORMAL
SODIUM SERPL-SCNC: 137 MMOL/L (ref 135–145)
SP GR UR STRIP: 1.02 (ref 1–1.03)
UROBILINOGEN UR STRIP-MCNC: 0.2 MG/DL
WBC # BLD: 6.6 K/MCL (ref 4.2–11)

## 2024-08-27 PROCEDURE — 0241U COVID/FLU/RSV PANEL: CPT

## 2024-08-27 PROCEDURE — 99283 EMERGENCY DEPT VISIT LOW MDM: CPT | Performed by: EMERGENCY MEDICINE

## 2024-08-27 PROCEDURE — 81003 URINALYSIS AUTO W/O SCOPE: CPT

## 2024-08-27 PROCEDURE — 36415 COLL VENOUS BLD VENIPUNCTURE: CPT

## 2024-08-27 PROCEDURE — 85025 COMPLETE CBC W/AUTO DIFF WBC: CPT

## 2024-08-27 PROCEDURE — 99283 EMERGENCY DEPT VISIT LOW MDM: CPT

## 2024-08-27 PROCEDURE — 80048 BASIC METABOLIC PNL TOTAL CA: CPT

## 2024-08-27 PROCEDURE — 71046 X-RAY EXAM CHEST 2 VIEWS: CPT

## 2024-08-27 PROCEDURE — 83735 ASSAY OF MAGNESIUM: CPT

## 2024-08-27 RX ORDER — ALPRAZOLAM 2 MG
1 TABLET ORAL 3 TIMES DAILY PRN
COMMUNITY

## 2024-08-27 RX ORDER — TRAZODONE HYDROCHLORIDE 100 MG/1
100 TABLET ORAL NIGHTLY
COMMUNITY

## 2024-08-27 ASSESSMENT — ENCOUNTER SYMPTOMS
BACK PAIN: 1
APPETITE CHANGE: 1
ENDOCRINE NEGATIVE: 1
ALLERGIC/IMMUNOLOGIC NEGATIVE: 1
GASTROINTESTINAL NEGATIVE: 1
RESPIRATORY NEGATIVE: 1
NEUROLOGICAL NEGATIVE: 1
HEMATOLOGIC/LYMPHATIC NEGATIVE: 1
PSYCHIATRIC NEGATIVE: 1
EYES NEGATIVE: 1

## 2024-08-28 ENCOUNTER — PATIENT MESSAGE (OUTPATIENT)
Dept: ORTHOPEDICS | Facility: CLINIC | Age: 59
End: 2024-08-28
Payer: MEDICAID

## 2024-09-04 ENCOUNTER — HOSPITAL ENCOUNTER (OUTPATIENT)
Dept: RADIOLOGY | Facility: HOSPITAL | Age: 59
Discharge: HOME | End: 2024-09-04
Attending: ORTHOPAEDIC SURGERY
Payer: MEDICAID

## 2024-09-04 ENCOUNTER — OFFICE VISIT (OUTPATIENT)
Dept: ORTHOPEDICS | Facility: CLINIC | Age: 59
End: 2024-09-04
Payer: MEDICAID

## 2024-09-04 VITALS — HEIGHT: 69 IN | BODY MASS INDEX: 36.28 KG/M2 | WEIGHT: 244.94 LBS

## 2024-09-04 DIAGNOSIS — M17.10 ARTHRITIS OF KNEE: Primary | ICD-10-CM

## 2024-09-04 DIAGNOSIS — Z96.652 STATUS POST TOTAL KNEE REPLACEMENT USING CEMENT, LEFT: Primary | ICD-10-CM

## 2024-09-04 DIAGNOSIS — M17.10 ARTHRITIS OF KNEE: ICD-10-CM

## 2024-09-04 PROCEDURE — 1159F MED LIST DOCD IN RCRD: CPT | Mod: CPTII,,, | Performed by: ORTHOPAEDIC SURGERY

## 2024-09-04 PROCEDURE — 73560 X-RAY EXAM OF KNEE 1 OR 2: CPT | Mod: 26,LT,, | Performed by: RADIOLOGY

## 2024-09-04 PROCEDURE — 99213 OFFICE O/P EST LOW 20 MIN: CPT | Mod: PBBFAC,25,PO | Performed by: ORTHOPAEDIC SURGERY

## 2024-09-04 PROCEDURE — 99024 POSTOP FOLLOW-UP VISIT: CPT | Mod: ,,, | Performed by: ORTHOPAEDIC SURGERY

## 2024-09-04 PROCEDURE — 1160F RVW MEDS BY RX/DR IN RCRD: CPT | Mod: CPTII,,, | Performed by: ORTHOPAEDIC SURGERY

## 2024-09-04 PROCEDURE — 73560 X-RAY EXAM OF KNEE 1 OR 2: CPT | Mod: TC,PO,LT

## 2024-09-04 PROCEDURE — 99999 PR PBB SHADOW E&M-EST. PATIENT-LVL III: CPT | Mod: PBBFAC,,, | Performed by: ORTHOPAEDIC SURGERY

## 2024-09-04 NOTE — PROGRESS NOTES
Past Medical History:   Diagnosis Date    Arthritis     Bilateral Knee Arthritis    Chronic bilateral low back pain with left-sided sciatica 02/10/2021    Digestive disorder     Essential tremor 02/10/2021    Heart disease     Hypertension     Obesity     Thyroid disease     NODULES       Past Surgical History:   Procedure Laterality Date    CARPAL TUNNEL RELEASE Right 05/09/2023    Procedure: Right carpal tunnel release;  Surgeon: Kash Sahni MD;  Location: [facility] OR;  Service: Orthopedics;  Laterality: Right;    CARPAL TUNNEL RELEASE Left 08/03/2023    Procedure: Left carpal tunnel release;  Surgeon: Kash Sahni MD;  Location: [facility] OR;  Service: Orthopedics;  Laterality: Left;    EPIDURAL STEROID INJECTION INTO CERVICAL SPINE N/A 06/07/2023    Procedure: Injection-steroid-epidural-cervical C7-T1;  Surgeon: Aileen Ocampo DO;  Location: [facility] PAIN MANAGEMENT;  Service: Pain Management;  Laterality: N/A;    EPIDURAL STEROID INJECTION INTO CERVICAL SPINE N/A 8/14/2024    Procedure: Injection-steroid-epidural-cervical  C7/T1;  Surgeon: Iván Qureshi MD;  Location: [facility] OR;  Service: Pain Management;  Laterality: N/A;    ESOPHAGEAL DILATION N/A 05/20/2022    Procedure: DILATION, ESOPHAGUS;  Surgeon: Jose Aguilera MD;  Location: [facility];  Service: Endoscopy;  Laterality: N/A;  [place]    ESOPHAGOGASTRODUODENOSCOPY N/A 05/20/2022    Procedure: EGD (ESOPHAGOGASTRODUODENOSCOPY);  Surgeon: Jose Aguilera MD;  Location: [facility];  Service: Endoscopy;  Laterality: N/A;    HYSTEROSCOPY WITH DILATION AND CURETTAGE OF UTERUS N/A 10/20/2023    Procedure: HYSTEROSCOPY, WITH DILATION AND CURETTAGE OF UTERUS;  Surgeon: Noa Farias MD;  Location: [facility];  Service: OB/GYN;  Laterality: N/A;    REMOVAL-MASS      of thyroid    ROBOTIC ARTHROPLASTY, KNEE Left 8/20/2024    Procedure: ROBOTIC ARTHROPLASTY, KNEE, TOTAL;  Surgeon: Brian Austin MD;  Location: [facility];  Service:  Orthopedics;  Laterality: Left;  armando    TUBAL LIGATION         Current Outpatient Medications   Medication Sig    acetaminophen (TYLENOL) 500 MG tablet Take 2 tablets (1,000 mg total) by mouth every 8 (eight) hours as needed for Pain.    aspirin (ECOTRIN) 81 MG EC tablet Take 1 tablet (81 mg total) by mouth 2 (two) times daily.    ergocalciferol (ERGOCALCIFEROL) 50,000 unit Cap Take 50,000 Units by mouth as needed.    famotidine (PEPCID) 20 MG tablet Take 20 mg by mouth 2 (two) times daily.    furosemide (LASIX) 40 MG tablet Take 40 mg by mouth once daily.    ibuprofen (ADVIL,MOTRIN) 600 MG tablet Take 1 tablet (600 mg total) by mouth 3 (three) times daily.    multivit-min/iron/folic acid/K (BARIATRIC MULTIVITAMINS ORAL) Take by mouth.    ondansetron (ZOFRAN) 4 MG tablet Take 1 tablet (4 mg total) by mouth every 6 (six) hours as needed for Nausea.    oxyCODONE-acetaminophen (PERCOCET) 5-325 mg per tablet Take 1 tablet by mouth every 6 (six) hours as needed for Pain.    potassium chloride SA (K-DUR,KLOR-CON) 20 MEQ tablet potassium chloride ER 20 mEq tablet,extended release(part/cryst)   TAKE ONE TABLET BY MOUTH ONCE DAILY     No current facility-administered medications for this visit.     Facility-Administered Medications Ordered in Other Visits   Medication    dextrose 50% injection 12.5 g    dextrose 50% injection 25 g    insulin regular injection 0-8 Units    lactated ringers infusion    LIDOcaine (PF) 10 mg/ml (1%) injection 10 mg       Review of patient's allergies indicates:  No Known Allergies    Family History   Problem Relation Name Age of Onset    Thyroid cancer Mother      Cancer Mother          breast    Dementia Mother      Heart disease Father      Tremor Father      Pacemaker/defibrilator Father      Thyroid cancer Sister      Ovarian cancer Maternal Aunt          20's    Cancer Maternal Grandmother      Colon cancer Neg Hx         Social History     Socioeconomic History    Marital status:     Tobacco Use    Smoking status: Never    Smokeless tobacco: Never   Substance and Sexual Activity    Alcohol use: Never    Drug use: Never    Sexual activity: Yes     Partners: Male     Birth control/protection: None     Social Determinants of Health     Financial Resource Strain: High Risk (3/25/2024)    Overall Financial Resource Strain (CARDIA)     Difficulty of Paying Living Expenses: Hard   Food Insecurity: Food Insecurity Present (3/25/2024)    Hunger Vital Sign     Worried About Running Out of Food in the Last Year: Often true     Ran Out of Food in the Last Year: Often true   Transportation Needs: No Transportation Needs (3/25/2024)    PRAPARE - Transportation     Lack of Transportation (Medical): No     Lack of Transportation (Non-Medical): No   Physical Activity: Insufficiently Active (3/25/2024)    Exercise Vital Sign     Days of Exercise per Week: 1 day     Minutes of Exercise per Session: 10 min   Stress: Stress Concern Present (3/25/2024)    [employer] [place] [employer] - Occupational Stress Questionnaire     Feeling of Stress : To some extent   Housing Stability: High Risk (3/25/2024)    Housing Stability Vital Sign     Unable to Pay for Housing in the Last Year: Yes     Number of Places Lived in the Last Year: 1     Unstable Housing in the Last Year: No       Chief Complaint: No chief complaint on file.      Date of surgery:  August 20, 2024    History of present illness:  59-year-old female underwent left robotic assisted total knee arthroplasty.  She is doing well.  Has a lot of swelling and bruising.  Pain is a 7/10.  First week was very hard but getting much better.  Is doing outpatient physical therapy.      Review of Systems:    Musculoskeletal:  See HPI        Physical Examination:    Vital Signs:  There were no vitals filed for this visit.    There is no height or weight on file to calculate BMI.    This a well-developed, well nourished patient in no acute distress.  They  are alert and oriented and cooperative to examination.  Pt. walks without an antalgic gait.      Examination left knee shows well-healing surgical incision.  No erythema drainage.  Patient has range of motion from 5-90 degrees.  No calf pain.  Significant pitting edema and swelling throughout the leg ankle and foot.    X-rays:  Two views of the left knee are ordered and review which show well-aligned left total knee arthroplasty components without complication     Assessment::  Status post left total knee arthroplasty    Plan:  I reviewed the findings with her today.  Continue outpatient physical therapy.  I will see her back in a month.  Okay to get the incision wet.    This note was created using [employer] voice recognition software that occasionally misinterpreted phrases or words.

## 2024-09-05 ENCOUNTER — OFFICE VISIT (OUTPATIENT)
Dept: PAIN MEDICINE | Facility: CLINIC | Age: 59
End: 2024-09-05
Payer: MEDICAID

## 2024-09-05 VITALS
BODY MASS INDEX: 35.69 KG/M2 | DIASTOLIC BLOOD PRESSURE: 74 MMHG | WEIGHT: 240.94 LBS | HEART RATE: 74 BPM | SYSTOLIC BLOOD PRESSURE: 116 MMHG | HEIGHT: 69 IN

## 2024-09-05 DIAGNOSIS — G89.4 CHRONIC PAIN SYNDROME: ICD-10-CM

## 2024-09-05 DIAGNOSIS — M54.2 CERVICALGIA: ICD-10-CM

## 2024-09-05 DIAGNOSIS — M54.50 LUMBAR BACK PAIN: ICD-10-CM

## 2024-09-05 DIAGNOSIS — M47.812 CERVICAL SPONDYLOSIS: Primary | ICD-10-CM

## 2024-09-05 PROCEDURE — 1160F RVW MEDS BY RX/DR IN RCRD: CPT | Mod: CPTII,,, | Performed by: PHYSICIAN ASSISTANT

## 2024-09-05 PROCEDURE — 1159F MED LIST DOCD IN RCRD: CPT | Mod: CPTII,,, | Performed by: PHYSICIAN ASSISTANT

## 2024-09-05 PROCEDURE — 3008F BODY MASS INDEX DOCD: CPT | Mod: CPTII,,, | Performed by: PHYSICIAN ASSISTANT

## 2024-09-05 PROCEDURE — 99999 PR PBB SHADOW E&M-EST. PATIENT-LVL IV: CPT | Mod: PBBFAC,,, | Performed by: PHYSICIAN ASSISTANT

## 2024-09-05 PROCEDURE — 3074F SYST BP LT 130 MM HG: CPT | Mod: CPTII,,, | Performed by: PHYSICIAN ASSISTANT

## 2024-09-05 PROCEDURE — 99214 OFFICE O/P EST MOD 30 MIN: CPT | Mod: S$PBB,,, | Performed by: PHYSICIAN ASSISTANT

## 2024-09-05 PROCEDURE — 99214 OFFICE O/P EST MOD 30 MIN: CPT | Mod: PBBFAC,PO | Performed by: PHYSICIAN ASSISTANT

## 2024-09-05 PROCEDURE — 3078F DIAST BP <80 MM HG: CPT | Mod: CPTII,,, | Performed by: PHYSICIAN ASSISTANT

## 2024-09-05 RX ORDER — OXYCODONE AND ACETAMINOPHEN 5; 325 MG/1; MG/1
1 TABLET ORAL EVERY 6 HOURS PRN
Qty: 28 TABLET | Refills: 0 | Status: SHIPPED | OUTPATIENT
Start: 2024-09-05

## 2024-09-05 RX ORDER — GABAPENTIN 300 MG/1
CAPSULE ORAL
Qty: 99 CAPSULE | Refills: 0 | Status: SHIPPED | OUTPATIENT
Start: 2024-09-05 | End: 2024-10-11

## 2024-09-05 NOTE — PROGRESS NOTES
[facility] Established Patient      Referred by: Alexandria Rollins PA-C     PCP: Wagner Rondon MD    CC:   Chief Complaint   Patient presents with    Follow-up     procedure follow up              HPI:   Mignon Hardy is a 59 y.o. year old female patient who has a past medical history of Arthritis, Chronic bilateral low back pain with left-sided sciatica, Digestive disorder, Essential tremor, Heart disease, Hypertension, Obesity, and Thyroid disease. She presents in referral from Alexandria Rollins PA-C for neck pain after undergoing KAILEE.  She underwent C5/6 ACDF in 1996.  She has had greater than 1 year pain in the left neck to the insterscapular region and shoulder.  It was associated with left elbow and wrist region pain.  She was seen by neurosurgery who recommended C7/T1 KAILEE for cervical radiculopathy associated with adjacent level disease at C6/7 greater than C4/5.  She had C7/T1 KAILEE 8-14-24.  After the injection, she felt about 50% relief of neck pain and left elbow/ wrist pain completely resolved.  However, after riding in a vehicle for some time yesterday, she has felt increased neck pain/ burning sensation today.      She had left knee replacement 2 weeks ago and is undergoing PT 3 days per week.  In addition, she reports chornic intermittent lumbar back pain bilaterally, described as tightness.  It increases with standing.  If sitting for an extended period of time then she feels right posterior thigh pain as well. She has never had PT or other treatments for back pain.    Denies bowel/ bladder incontinence.    Past and current medications:  Antineuropathics:  NSAIDs: advil  Antidepressants:  Muscle relaxers:  Opioids: percocet  Antiplatelets/Anticoagulants:  Others:  tylenol    Physical Therapy/ Chiropractic care:  History of PT in the neck; none for lower back    Pain Intervention History:  6-7-23   C7/T1 KAILEE with Aileen Ocampo DO  8-14-24 C7/T1 KAILEE with Dr. Qureshi - 50% improvement of neck  pain; resolution of left elbow, wrist pain.    Past Spine Surgical History:  C5/6 ACDF in 1996        History:    Current Outpatient Medications:     acetaminophen (TYLENOL) 500 MG tablet, Take 2 tablets (1,000 mg total) by mouth every 8 (eight) hours as needed for Pain., Disp: 30 tablet, Rfl: 0    aspirin (ECOTRIN) 81 MG EC tablet, Take 1 tablet (81 mg total) by mouth 2 (two) times daily., Disp: 56 tablet, Rfl: 0    ergocalciferol (ERGOCALCIFEROL) 50,000 unit Cap, Take 50,000 Units by mouth as needed., Disp: , Rfl:     famotidine (PEPCID) 20 MG tablet, Take 20 mg by mouth 2 (two) times daily., Disp: , Rfl:     furosemide (LASIX) 40 MG tablet, Take 40 mg by mouth once daily., Disp: , Rfl:     ibuprofen (ADVIL,MOTRIN) 600 MG tablet, Take 1 tablet (600 mg total) by mouth 3 (three) times daily., Disp: 30 tablet, Rfl: 0    multivit-min/iron/folic acid/K (BARIATRIC MULTIVITAMINS ORAL), Take by mouth., Disp: , Rfl:     ondansetron (ZOFRAN) 4 MG tablet, Take 1 tablet (4 mg total) by mouth every 6 (six) hours as needed for Nausea., Disp: 30 tablet, Rfl: 0    oxyCODONE-acetaminophen (PERCOCET) 5-325 mg per tablet, Take 1 tablet by mouth every 6 (six) hours as needed for Pain., Disp: 28 tablet, Rfl: 0    potassium chloride SA (K-DUR,KLOR-CON) 20 MEQ tablet, potassium chloride ER 20 mEq tablet,extended release(part/cryst)  TAKE ONE TABLET BY MOUTH ONCE DAILY, Disp: , Rfl:     gabapentin (NEURONTIN) 300 MG capsule, Take 1 capsule (300 mg total) by mouth every evening for 3 days, THEN 1 capsule (300 mg total) 2 (two) times daily for 3 days, THEN 1 capsule (300 mg total) 3 (three) times daily., Disp: 99 capsule, Rfl: 0  No current facility-administered medications for this visit.    Facility-Administered Medications Ordered in Other Visits:     dextrose 50% injection 12.5 g, 12.5 g, Intravenous, PRN, Cassandra Thompson MD    dextrose 50% injection 25 g, 25 g, Intravenous, PRN, Cassandra Thompson MD    insulin regular injection  0-8 Units, 0-8 Units, Intravenous, PRN, Cassandra Thompson MD    lactated ringers infusion, , Intravenous, Continuous, Cassandra Thompson MD, New Bag at 10/20/23 1159    LIDOcaine (PF) 10 mg/ml (1%) injection 10 mg, 1 mL, Intradermal, Once, Cassandra Thompson MD    Past Medical History:   Diagnosis Date    Arthritis     Bilateral Knee Arthritis    Chronic bilateral low back pain with left-sided sciatica 02/10/2021    Digestive disorder     Essential tremor 02/10/2021    Heart disease     Hypertension     Obesity     Thyroid disease     NODULES       Past Surgical History:   Procedure Laterality Date    CARPAL TUNNEL RELEASE Right 05/09/2023    Procedure: Right carpal tunnel release;  Surgeon: Kash Sahni MD;  Location: [facility] OR;  Service: Orthopedics;  Laterality: Right;    CARPAL TUNNEL RELEASE Left 08/03/2023    Procedure: Left carpal tunnel release;  Surgeon: Kash Sahni MD;  Location: [facility] OR;  Service: Orthopedics;  Laterality: Left;    EPIDURAL STEROID INJECTION INTO CERVICAL SPINE N/A 06/07/2023    Procedure: Injection-steroid-epidural-cervical C7-T1;  Surgeon: Aileen Ocampo DO;  Location: [facility] PAIN MANAGEMENT;  Service: Pain Management;  Laterality: N/A;    EPIDURAL STEROID INJECTION INTO CERVICAL SPINE N/A 8/14/2024    Procedure: Injection-steroid-epidural-cervical  C7/T1;  Surgeon: Iván Qureshi MD;  Location: [facility] OR;  Service: Pain Management;  Laterality: N/A;    ESOPHAGEAL DILATION N/A 05/20/2022    Procedure: DILATION, ESOPHAGUS;  Surgeon: Jose Aguilera MD;  Location: [facility];  Service: Endoscopy;  Laterality: N/A;  [place]    ESOPHAGOGASTRODUODENOSCOPY N/A 05/20/2022    Procedure: EGD (ESOPHAGOGASTRODUODENOSCOPY);  Surgeon: Jose Aguilera MD;  Location: [facility];  Service: Endoscopy;  Laterality: N/A;    HYSTEROSCOPY WITH DILATION AND CURETTAGE OF UTERUS N/A 10/20/2023    Procedure: HYSTEROSCOPY, WITH DILATION AND CURETTAGE OF UTERUS;  Surgeon: Noa Farias  MD BENITA;  Location: [facility] OR;  Service: OB/GYN;  Laterality: N/A;    REMOVAL-MASS      of thyroid    ROBOTIC ARTHROPLASTY, KNEE Left 8/20/2024    Procedure: ROBOTIC ARTHROPLASTY, KNEE, TOTAL;  Surgeon: Brian Austin MD;  Location: [facility] OR;  Service: Orthopedics;  Laterality: Left;  armando    TUBAL LIGATION         Family History   Problem Relation Name Age of Onset    Thyroid cancer Mother      Cancer Mother          breast    Dementia Mother      Heart disease Father      Tremor Father      Pacemaker/defibrilator Father      Thyroid cancer Sister      Ovarian cancer Maternal Aunt          20's    Cancer Maternal Grandmother      Colon cancer Neg Hx         Social History     Socioeconomic History    Marital status:    Tobacco Use    Smoking status: Never    Smokeless tobacco: Never   Substance and Sexual Activity    Alcohol use: Never    Drug use: Never    Sexual activity: Yes     Partners: Male     Birth control/protection: None     Social Determinants of Health     Financial Resource Strain: High Risk (3/25/2024)    Overall Financial Resource Strain (CARDIA)     Difficulty of Paying Living Expenses: Hard   Food Insecurity: Food Insecurity Present (3/25/2024)    Hunger Vital Sign     Worried About Running Out of Food in the Last Year: Often true     Ran Out of Food in the Last Year: Often true   Transportation Needs: No Transportation Needs (3/25/2024)    PRAPARE - Transportation     Lack of Transportation (Medical): No     Lack of Transportation (Non-Medical): No   Physical Activity: Insufficiently Active (3/25/2024)    Exercise Vital Sign     Days of Exercise per Week: 1 day     Minutes of Exercise per Session: 10 min   Stress: Stress Concern Present (3/25/2024)    [employer] [place] - Occupational Stress Questionnaire     Feeling of Stress : To some extent   Housing Stability: High Risk (3/25/2024)    Housing Stability Vital Sign     Unable to Pay for Housing in the Last  "Year: Yes     Number of Places Lived in the Last Year: 1     Unstable Housing in the Last Year: No       Review of patient's allergies indicates:  No Known Allergies    Labs:  Lab Results   Component Value Date    HGBA1C 5.7 (H) 09/25/2023       Lab Results   Component Value Date    WBC 6.04 08/12/2024    HGB 12.7 08/12/2024    HCT 39.3 08/12/2024    MCV 97 08/12/2024     08/12/2024           Review of Systems:  Neck pain.  Low back pain. Left knee pain  Balance of review of systems is negative.    Physical Exam:  Vitals:    09/05/24 0823   BP: 116/74   Pulse: 74   Weight: 109.3 kg (240 lb 15.4 oz)   Height: 5' 9" (1.753 m)   PainSc:   6   PainLoc: Neck     Body mass index is 35.58 kg/m².    Pain disability index:      9/5/2024     8:21 AM 7/17/2023     1:26 PM 4/18/2023     1:55 PM   Last 3 PDI Scores   Pain Disability Index (PDI) 35 55 46       Gen: NAD  Psych: mood appropriate for given condition  HEENT: eyes anicteric   CV: RRR, 2+ radial pulse  Respiratory: non-labored, no signs of respiratory distress  Abd: non-distended  Skin: warm, dry and intact.  Gait: Antalgic gait; knee surgery 2 weeks ago; using walker for ambulatory assistance.      Cervical spine: ROM is full in flexion, extension and lateral rotation without increased pain.  Spurling's maneuver causes no neck pain to either side.  Myofascial exam: No Tenderness to palpation across cervical paraspinous region bilaterally.    Lumbar spine:  Lumbar spine: ROM is full with flexion extension and oblique extension with no increased pain.    Yonny's test causes no increased pain on either side.    Supine straight leg raise is negative bilaterally.    Internal and external rotation of the hip causes no increased pain on either side.  Myofascial exam: No tenderness to palpation across lumbar paraspinous muscles. No tenderness to palpation over the bilateral greater trochanters and bilateral SI joint    Sensory:  Intact and symmetrical to light touch " in C4-T1 dermatomes bilaterally. Intact and symmetrical to light touch in L1-S1 dermatomes bilaterally.    Motor:    Right Left   C4 Shoulder Abduction  5  5   C5 Elbow Flexion    5  5   C6 Wrist Extension  5  5   C7 Elbow Extension   5  5   C8/T1 Hand Intrinsics   5  5        Right Left   L2/3 Iliacus Hip flexion  5  5   L3/4 Qudratus Femoris Knee Extension  5  5   L4/5 Tib Anterior Ankle Dorsiflexion   5  5   L5/S1 Extensor Hallicus Longus Great toe extension  5  5   S1/S2 Gastroc/Soleus Plantar Flexion  5  5      Right Left   Triceps DTR 2+ 2+   Biceps DTR 2+ 2+   Brachioradialis DTR 2+ 2+   Patellar DTR 2+ 2+   Achilles DTR 2+ 2+   Cueto Absent  Absent   Clonus Absent Absent   Babinski Absent Absent       Imaging:    MRI cervical spine from 7-11-24:  COMPARISON:  MRI cervical spine without contrast, 03/30/2023.     FINDINGS:  CORD: Normal size and signal.  No syrinx.  Cervicomedullary junction is normal.  ALIGNMENT: Trace retrolisthesis of C6 on C7.  Reversal of the normal lordotic curvature.  Lateral masses of C1 and C2 are congruent.  BONES: Solid osseous fusion of the C5 and 6 vertebral bodies.  Vertebral body heights are maintained.  No aggressive bone marrow signal.  PARASPINAL AREA: Normal.  OTHER: 2.1 x 1.1 cm likely left thyroid nodule (series 6, image 16).  This is similar in size compared to prior study from 03/30/2023.     CERVICAL DISC LEVELS:  C2-C3: Mild disc osteophyte complex.  Preserved ventral and dorsal CSF surrounding the cord.  No significant foraminal stenosis.  C3-C4: Mild disc osteophyte complex with prominent bilateral uncovertebral spurring.  Mild right and minimal left facet hypertrophy.  Slight ventral cord flattening with thin sliver preserved ventral and preserved dorsal CSF surrounding the cord, similar to prior study.  Moderate-severe bilateral foraminal stenosis, unchanged.  C4-C5: Mild disc osteophyte complex.  Mild bilateral facet hypertrophy.  Prominent right greater than  left uncovertebral spurring.  Mild cord flattening with thin sliver preserved ventral and preserved dorsal CSF surrounding the cord, similar to prior study.  Moderate right and mild left foraminal stenosis, unchanged.  C5-C6: Fused.  Mild posterior osseous ridging.  Slight ventral cord flattening with preserved ventral and dorsal CSF surrounding the cord, unchanged.  No significant foraminal stenosis.  C6-C7: Trace retrolisthesis.  Mild-moderate disc osteophyte complex with prominent bilateral uncovertebral spurring.  Ligamentum flavum thickening.  Mild right facet hypertrophy.  Preserved ventral and dorsal CSF surrounding the cord.  Moderate-severe bilateral foraminal stenosis, unchanged.  C7-T1: Mild disc osteophyte complex.  Ligamentum flavum thickening. Mild bilateral facet hypertrophy.  Preserved ventral and dorsal CSF surrounding the cord.  No significant foraminal stenosis.      Assessment:   Mignon Hardy is a 59 y.o. year old female patient who has a past medical history of Arthritis, Chronic bilateral low back pain with left-sided sciatica, Digestive disorder, Essential tremor, Heart disease, Hypertension, Obesity, and Thyroid disease. She presents for neck and left arm pain.      MRI cervical spine personally reviewed.  Postoperative changes at C5-6 with anterior cervical fusion.  Reversal of cervical lordosis.  At C4-5 there is disc osteophyte complex and facet hypertrophy resulting in moderate right, mild left foraminal narrowing.  C6-7 disc osteophyte complex and facet hypertrophy results in moderate to severe bilateral foraminal narrowing.  There is flattening of the anterior spinal cord without cord compression or cord signal change.    Ms. 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  History of C5-6 anterior fusion.  Arm pain improved after epidural steroid injection.  She had 50% improvement of neck pain with some  recurrence yesterday.  She has chronic lower back pain with occasional right leg radicular pain.  Lower back pain may be more myofascial in nature but can not rule out true lumbar radiculopathy.    Plan:  - to further help with neck pain, we discussed the role of medications, additional trial of physical therapy, further KAILEE, and referral back to neurosurgery.  - she is currently undergoing physical therapy 3 days a week for her knee.  She will discuss neck pain and back pain with physical therapy.  - we will try gabapentin.  Discussed medication side effects, risks, potential benefits.  - to help with neck and back pain, I encouraged movement, stretching, regular exercise.  I would expect lower back pain to improve as she continues to work with therapy for her knee postop.  -follow-up in about 8 weeks to monitor progress.    Problem List Items Addressed This Visit    None  Visit Diagnoses       Cervical spondylosis    -  Primary    Relevant Medications    gabapentin (NEURONTIN) 300 MG capsule    Chronic pain syndrome        Lumbar back pain        Relevant Medications    gabapentin (NEURONTIN) 300 MG capsule    Cervicalgia        Relevant Medications    gabapentin (NEURONTIN) 300 MG capsule            Follow Up: RTC in 8 weeks    : Reviewed and consistent with medication use as prescribed.    Thank you for referring this interesting patient, and I look forward to continuing to collaborate in her care.        Sandra Guadalupe PA-C  [facility]

## 2024-09-08 DIAGNOSIS — Z96.652 STATUS POST TOTAL KNEE REPLACEMENT USING CEMENT, LEFT: Primary | ICD-10-CM

## 2024-09-09 ENCOUNTER — PATIENT MESSAGE (OUTPATIENT)
Dept: ORTHOPEDICS | Facility: CLINIC | Age: 59
End: 2024-09-09
Payer: MEDICAID

## 2024-09-09 RX ORDER — IBUPROFEN 600 MG/1
600 TABLET ORAL 3 TIMES DAILY
Qty: 30 TABLET | Refills: 0 | Status: SHIPPED | OUTPATIENT
Start: 2024-09-09

## 2024-09-12 DIAGNOSIS — Z96.652 STATUS POST TOTAL KNEE REPLACEMENT USING CEMENT, LEFT: Primary | ICD-10-CM

## 2024-09-12 RX ORDER — CYCLOBENZAPRINE HCL 10 MG
10 TABLET ORAL 3 TIMES DAILY PRN
Qty: 30 TABLET | Refills: 0 | Status: SHIPPED | OUTPATIENT
Start: 2024-09-12 | End: 2024-09-22

## 2024-09-16 ENCOUNTER — OFFICE VISIT (OUTPATIENT)
Dept: ORTHOPEDICS | Facility: CLINIC | Age: 59
End: 2024-09-16
Payer: MEDICAID

## 2024-09-16 DIAGNOSIS — G56.01 CARPAL TUNNEL SYNDROME ON RIGHT: Primary | ICD-10-CM

## 2024-09-16 PROCEDURE — 99213 OFFICE O/P EST LOW 20 MIN: CPT | Mod: S$PBB,,, | Performed by: ORTHOPAEDIC SURGERY

## 2024-09-16 PROCEDURE — 99999 PR PBB SHADOW E&M-EST. PATIENT-LVL II: CPT | Mod: PBBFAC,,, | Performed by: ORTHOPAEDIC SURGERY

## 2024-09-16 PROCEDURE — 1159F MED LIST DOCD IN RCRD: CPT | Mod: CPTII,,, | Performed by: ORTHOPAEDIC SURGERY

## 2024-09-16 PROCEDURE — 99212 OFFICE O/P EST SF 10 MIN: CPT | Mod: PBBFAC,PO | Performed by: ORTHOPAEDIC SURGERY

## 2024-09-16 NOTE — PROGRESS NOTES
Ms Hardy returns to clinic today.  Has a history of carpal tunnel syndrome.  This has been stable.  She did have a carpal tunnel release.  We injected her carpal tunnel at her last visit.  She states that she did not have a significant amount of improvement with her symptoms with the injection.  She recently underwent a total knee replacement in that her focus is mainly on her knee at this time.  She states that the carpal tunnel symptoms are mild.  She does have some nodules in the palm of the hand.      Physical exam: Examination of the right hand and left hand reveals that there is no major edema.  Has well-healed incision.  Palpation reveals Dupuytren's nodules in the palms of both hands.  These are only minimally tender.  She was able make full composite fist and fully extend the fingers without contracture.  She does report grossly intact sensation.  He was mildly positive Tinel's.    Assessment: Bilateral hand Dupuytren's disease with right carpal tunnel syndrome status post release     Plan:     1. We will continue to monitor the Dupuytren's disease as there was no contracture noted     2. He was currently working on regaining function to her knee and therefore we will continue to monitor of the carpal tunnel syndrome.  She did not have a significant improvement with the last steroid injection and therefore we may consider a workup of her cervical spine if she continues to have symptoms.      3.  She will follow up in 3 months for repeat evaluation

## 2024-09-18 RX ORDER — OXYCODONE AND ACETAMINOPHEN 5; 325 MG/1; MG/1
1 TABLET ORAL EVERY 6 HOURS PRN
Qty: 28 TABLET | Refills: 0 | Status: SHIPPED | OUTPATIENT
Start: 2024-09-18

## 2024-09-18 RX ORDER — ASPIRIN 81 MG/1
81 TABLET ORAL 2 TIMES DAILY
Qty: 56 TABLET | Refills: 0 | Status: CANCELLED | OUTPATIENT
Start: 2024-09-18 | End: 2025-09-18

## 2024-10-02 ENCOUNTER — PATIENT MESSAGE (OUTPATIENT)
Dept: ORTHOPEDICS | Facility: CLINIC | Age: 59
End: 2024-10-02
Payer: MEDICAID

## 2024-10-02 ENCOUNTER — OFFICE VISIT (OUTPATIENT)
Dept: ORTHOPEDICS | Facility: CLINIC | Age: 59
End: 2024-10-02
Payer: MEDICAID

## 2024-10-02 VITALS — BODY MASS INDEX: 35.69 KG/M2 | WEIGHT: 240.94 LBS | HEIGHT: 69 IN | RESPIRATION RATE: 18 BRPM

## 2024-10-02 DIAGNOSIS — M17.10 ARTHRITIS OF KNEE: ICD-10-CM

## 2024-10-02 DIAGNOSIS — Z96.652 STATUS POST TOTAL KNEE REPLACEMENT USING CEMENT, LEFT: Primary | ICD-10-CM

## 2024-10-02 DIAGNOSIS — M17.10 ARTHRITIS OF KNEE: Primary | ICD-10-CM

## 2024-10-02 PROCEDURE — 99213 OFFICE O/P EST LOW 20 MIN: CPT | Mod: PBBFAC,PO | Performed by: ORTHOPAEDIC SURGERY

## 2024-10-02 PROCEDURE — 1159F MED LIST DOCD IN RCRD: CPT | Mod: CPTII,,, | Performed by: ORTHOPAEDIC SURGERY

## 2024-10-02 PROCEDURE — 99999 PR PBB SHADOW E&M-EST. PATIENT-LVL III: CPT | Mod: PBBFAC,,, | Performed by: ORTHOPAEDIC SURGERY

## 2024-10-02 PROCEDURE — 99214 OFFICE O/P EST MOD 30 MIN: CPT | Mod: 57,S$PBB,, | Performed by: ORTHOPAEDIC SURGERY

## 2024-10-02 PROCEDURE — 3008F BODY MASS INDEX DOCD: CPT | Mod: CPTII,,, | Performed by: ORTHOPAEDIC SURGERY

## 2024-10-02 NOTE — PROGRESS NOTES
Past Medical History:   Diagnosis Date    Arthritis     Bilateral Knee Arthritis    Chronic bilateral low back pain with left-sided sciatica 02/10/2021    Digestive disorder     Essential tremor 02/10/2021    Heart disease     Hypertension     Obesity     Thyroid disease     NODULES       Past Surgical History:   Procedure Laterality Date    CARPAL TUNNEL RELEASE Right 05/09/2023    Procedure: Right carpal tunnel release;  Surgeon: Kash Sahni MD;  Location: [facility] OR;  Service: Orthopedics;  Laterality: Right;    CARPAL TUNNEL RELEASE Left 08/03/2023    Procedure: Left carpal tunnel release;  Surgeon: Kash Sahni MD;  Location: [facility] OR;  Service: Orthopedics;  Laterality: Left;    EPIDURAL STEROID INJECTION INTO CERVICAL SPINE N/A 06/07/2023    Procedure: Injection-steroid-epidural-cervical C7-T1;  Surgeon: Aileen Ocampo DO;  Location: [facility] PAIN MANAGEMENT;  Service: Pain Management;  Laterality: N/A;    EPIDURAL STEROID INJECTION INTO CERVICAL SPINE N/A 8/14/2024    Procedure: Injection-steroid-epidural-cervical  C7/T1;  Surgeon: Iván Qureshi MD;  Location: [facility] OR;  Service: Pain Management;  Laterality: N/A;    ESOPHAGEAL DILATION N/A 05/20/2022    Procedure: DILATION, ESOPHAGUS;  Surgeon: Jose Aguilera MD;  Location: [facility];  Service: Endoscopy;  Laterality: N/A;  [place]ates    ESOPHAGOGASTRODUODENOSCOPY N/A 05/20/2022    Procedure: EGD (ESOPHAGOGASTRODUODENOSCOPY);  Surgeon: Jose Aguilera MD;  Location: [facility];  Service: Endoscopy;  Laterality: N/A;    HYSTEROSCOPY WITH DILATION AND CURETTAGE OF UTERUS N/A 10/20/2023    Procedure: HYSTEROSCOPY, WITH DILATION AND CURETTAGE OF UTERUS;  Surgeon: Noa Farias MD;  Location: [facility];  Service: OB/GYN;  Laterality: N/A;    REMOVAL-MASS      of thyroid    ROBOTIC ARTHROPLASTY, KNEE Left 8/20/2024    Procedure: ROBOTIC ARTHROPLASTY, KNEE, TOTAL;  Surgeon: Brian Austin MD;  Location: [facility];  Service:  Orthopedics;  Laterality: Left;  armando    TUBAL LIGATION         Current Outpatient Medications   Medication Sig    acetaminophen (TYLENOL) 500 MG tablet Take 2 tablets (1,000 mg total) by mouth every 8 (eight) hours as needed for Pain.    aspirin (ECOTRIN) 81 MG EC tablet Take 1 tablet (81 mg total) by mouth 2 (two) times daily.    ergocalciferol (ERGOCALCIFEROL) 50,000 unit Cap Take 50,000 Units by mouth as needed.    famotidine (PEPCID) 20 MG tablet Take 20 mg by mouth 2 (two) times daily.    furosemide (LASIX) 40 MG tablet Take 40 mg by mouth once daily.    gabapentin (NEURONTIN) 300 MG capsule Take 1 capsule (300 mg total) by mouth every evening for 3 days, THEN 1 capsule (300 mg total) 2 (two) times daily for 3 days, THEN 1 capsule (300 mg total) 3 (three) times daily.    ibuprofen (ADVIL,MOTRIN) 600 MG tablet Take 1 tablet (600 mg total) by mouth 3 (three) times daily.    multivit-min/iron/folic acid/K (BARIATRIC MULTIVITAMINS ORAL) Take by mouth.    ondansetron (ZOFRAN) 4 MG tablet Take 1 tablet (4 mg total) by mouth every 6 (six) hours as needed for Nausea.    oxyCODONE-acetaminophen (PERCOCET) 5-325 mg per tablet Take 1 tablet by mouth every 6 (six) hours as needed for Pain.    potassium chloride SA (K-DUR,KLOR-CON) 20 MEQ tablet potassium chloride ER 20 mEq tablet,extended release(part/cryst)   TAKE ONE TABLET BY MOUTH ONCE DAILY     No current facility-administered medications for this visit.     Facility-Administered Medications Ordered in Other Visits   Medication    dextrose 50% injection 12.5 g    dextrose 50% injection 25 g    insulin regular injection 0-8 Units    lactated ringers infusion    LIDOcaine (PF) 10 mg/ml (1%) injection 10 mg       Review of patient's allergies indicates:  No Known Allergies    Family History   Problem Relation Name Age of Onset    Thyroid cancer Mother      Cancer Mother          breast    Dementia Mother      Heart disease Father      Tremor Father       Pacemaker/defibrilator Father      Thyroid cancer Sister      Ovarian cancer Maternal Aunt          20's    Cancer Maternal Grandmother      Colon cancer Neg Hx         Social History     Socioeconomic History    Marital status:    Tobacco Use    Smoking status: Never    Smokeless tobacco: Never   Substance and Sexual Activity    Alcohol use: Never    Drug use: Never    Sexual activity: Yes     Partners: Male     Birth control/protection: None     Social Drivers of Health     Financial Resource Strain: High Risk (3/25/2024)    Overall Financial Resource Strain (CARDIA)     Difficulty of Paying Living Expenses: Hard   Food Insecurity: Food Insecurity Present (3/25/2024)    Hunger Vital Sign     Worried About Running Out of Food in the Last Year: Often true     Ran Out of Food in the Last Year: Often true   Transportation Needs: No Transportation Needs (3/25/2024)    PRAPARE - Transportation     Lack of Transportation (Medical): No     Lack of Transportation (Non-Medical): No   Physical Activity: Insufficiently Active (3/25/2024)    Exercise Vital Sign     Days of Exercise per Week: 1 day     Minutes of Exercise per Session: 10 min   Stress: Stress Concern Present (3/25/2024)    [employer] - Occupational Stress Questionnaire     Feeling of Stress : To some extent   Housing Stability: High Risk (3/25/2024)    Housing Stability Vital Sign     Unable to Pay for Housing in the Last Year: Yes     Number of Places Lived in the Last Year: 1     Unstable Housing in the Last Year: No       Chief Complaint: No chief complaint on file.      Date of surgery:  August 20, 2024    History of present illness:  59-year-old female underwent left robotic assisted total knee arthroplasty.  She is doing well.  Has a lot of swelling and bruising.  Pain is a 2/10.  First week was very hard but getting much better.  Is doing outpatient physical therapy.  She is doing great.      Review of  Systems:    Musculoskeletal:  See HPI        Physical Examination:    Vital Signs:  There were no vitals filed for this visit.    There is no height or weight on file to calculate BMI.    This a well-developed, well nourished patient in no acute distress.  They are alert and oriented and cooperative to examination.  Pt. walks without an antalgic gait.      Examination left knee shows well-healing surgical incision.  No erythema drainage.  Patient has range of motion from 5-90 degrees.  No calf pain.  Significant pitting edema and swelling throughout the leg ankle and foot.    Heart is regular rate without obvious murmurs   Normal respiratory effort without audible wheezing  Abdomen is soft and nontender     X-rays:  Two views of the left knee are  review which show well-aligned left total knee arthroplasty components without complication     Assessment::  Status post left total knee arthroplasty    Severe right knee arthritis    Plan:  I reviewed the findings with her today.  Continue outpatient physical therapy.  We will go ahead and get her scheduled for her right robotic assisted total knee arthroplasty.  Risks, benefits, and alternatives to the procedure were explained to the patient including but not limited to damage to nerves, arteries, blood vessels, bones, tendons, ligaments, stiffness, instability, infection, permanent limb dysfunction, DVT, PE, as well as general anesthetic complications including seizure, stroke, heart attack and even death. The patient understood these risks and wished to proceed and signed the informed consent.       This note was created using M Modal voice recognition software that occasionally misinterpreted phrases or words.

## 2024-10-03 DIAGNOSIS — Z01.818 PRE-OP TESTING: ICD-10-CM

## 2024-10-03 DIAGNOSIS — M17.10 ARTHRITIS OF KNEE: Primary | ICD-10-CM

## 2024-10-03 RX ORDER — SODIUM CHLORIDE 9 MG/ML
INJECTION, SOLUTION INTRAVENOUS CONTINUOUS
OUTPATIENT
Start: 2024-10-03

## 2024-10-03 RX ORDER — MUPIROCIN 20 MG/G
OINTMENT TOPICAL
OUTPATIENT
Start: 2024-10-03

## 2024-10-17 ENCOUNTER — HOSPITAL ENCOUNTER (OUTPATIENT)
Dept: RADIOLOGY | Facility: HOSPITAL | Age: 59
Discharge: HOME | End: 2024-10-17
Attending: ORTHOPAEDIC SURGERY
Payer: MEDICAID

## 2024-10-17 ENCOUNTER — HOSPITAL ENCOUNTER (OUTPATIENT)
Dept: PREADMISSION TESTING | Facility: HOSPITAL | Age: 59
Discharge: HOME | End: 2024-10-17
Attending: ORTHOPAEDIC SURGERY
Payer: MEDICAID

## 2024-10-17 DIAGNOSIS — Z01.818 PRE-OP TESTING: ICD-10-CM

## 2024-10-17 DIAGNOSIS — M17.10 ARTHRITIS OF KNEE: ICD-10-CM

## 2024-10-17 DIAGNOSIS — Z01.818 PREOP TESTING: Primary | ICD-10-CM

## 2024-10-17 LAB
ANION GAP SERPL CALC-SCNC: 11 MMOL/L (ref 8–16)
BASOPHILS # BLD AUTO: 0.06 K/UL (ref 0–0.2)
BASOPHILS NFR BLD: 1.1 % (ref 0–1.9)
BUN SERPL-MCNC: 15 MG/DL (ref 6–20)
CALCIUM SERPL-MCNC: 9.5 MG/DL (ref 8.7–10.5)
CHLORIDE SERPL-SCNC: 105 MMOL/L (ref 95–110)
CO2 SERPL-SCNC: 26 MMOL/L (ref 23–29)
CREAT SERPL-MCNC: 0.7 MG/DL (ref 0.5–1.4)
DIFFERENTIAL METHOD BLD: ABNORMAL
EOSINOPHIL # BLD AUTO: 0.2 K/UL (ref 0–0.5)
EOSINOPHIL NFR BLD: 3 % (ref 0–8)
ERYTHROCYTE [DISTWIDTH] IN BLOOD BY AUTOMATED COUNT: 13.7 % (ref 11.5–14.5)
EST. GFR  (NO RACE VARIABLE): >60 ML/MIN/1.73 M^2
GLUCOSE SERPL-MCNC: 88 MG/DL (ref 70–110)
HCT VFR BLD AUTO: 37.2 % (ref 37–48.5)
HGB BLD-MCNC: 11.7 G/DL (ref 12–16)
IMM GRANULOCYTES # BLD AUTO: 0.01 K/UL (ref 0–0.04)
IMM GRANULOCYTES NFR BLD AUTO: 0.2 % (ref 0–0.5)
LYMPHOCYTES # BLD AUTO: 1.9 K/UL (ref 1–4.8)
LYMPHOCYTES NFR BLD: 34.8 % (ref 18–48)
MCH RBC QN AUTO: 30.3 PG (ref 27–31)
MCHC RBC AUTO-ENTMCNC: 31.5 G/DL (ref 32–36)
MCV RBC AUTO: 96 FL (ref 82–98)
MONOCYTES # BLD AUTO: 0.4 K/UL (ref 0.3–1)
MONOCYTES NFR BLD: 6.8 % (ref 4–15)
MRSA SCREEN BY PCR: NEGATIVE
NEUTROPHILS # BLD AUTO: 2.9 K/UL (ref 1.8–7.7)
NEUTROPHILS NFR BLD: 54.1 % (ref 38–73)
NRBC BLD-RTO: 0 /100 WBC
PLATELET # BLD AUTO: 329 K/UL (ref 150–450)
PMV BLD AUTO: 10.3 FL (ref 9.2–12.9)
POTASSIUM SERPL-SCNC: 3.8 MMOL/L (ref 3.5–5.1)
RBC # BLD AUTO: 3.86 M/UL (ref 4–5.4)
SODIUM SERPL-SCNC: 142 MMOL/L (ref 136–145)
WBC # BLD AUTO: 5.31 K/UL (ref 3.9–12.7)

## 2024-10-17 PROCEDURE — 73700 CT LOWER EXTREMITY W/O DYE: CPT | Mod: TC,RT

## 2024-10-17 PROCEDURE — 85025 COMPLETE CBC W/AUTO DIFF WBC: CPT | Performed by: ORTHOPAEDIC SURGERY

## 2024-10-17 PROCEDURE — 36415 COLL VENOUS BLD VENIPUNCTURE: CPT | Performed by: ORTHOPAEDIC SURGERY

## 2024-10-17 PROCEDURE — 87641 MR-STAPH DNA AMP PROBE: CPT | Performed by: ORTHOPAEDIC SURGERY

## 2024-10-17 PROCEDURE — 80048 BASIC METABOLIC PNL TOTAL CA: CPT | Performed by: ORTHOPAEDIC SURGERY

## 2024-10-17 RX ORDER — CHOLECALCIFEROL (VITAMIN D3) 25 MCG
2 TABLET ORAL EVERY MORNING
COMMUNITY

## 2024-10-17 RX ORDER — CALCIUM CARBONATE 500(1250)
1 TABLET,CHEWABLE ORAL EVERY MORNING
COMMUNITY

## 2024-10-17 NOTE — DISCHARGE INSTRUCTIONS
To confirm, Your doctor has instructed you that surgery is scheduled for: 10/22/24 with DR. YNU    Please report to [facility], Registration the morning of surgery. You must check-in and receive a wristband before going to your procedure.  [address] [place] [address] [place] [address]    Pre-Op will call the afternoon prior to surgery between 1:00 and 6:00 PM with the final arrival time.  Phone number: [phone]    PLEASE NOTE:  The surgery schedule has many variables which may affect the time of your surgery case.  Family members should be available if your surgery time changes.  Plan to be here the day of your procedure between 4-6 hours.    MEDICATIONS:  TAKE ONLY THESE MEDICATIONS WITH A SMALL SIP OF WATER THE MORNING OF YOUR PROCEDURE:  SEE LIST      DO NOT TAKE THESE MEDICATIONS 5-7 DAYS PRIOR to your procedure or per your surgeon's request:   ASPIRIN, ALEVE, ADVIL, IBUPROFEN, FISH OIL VITAMIN E, HERBALS  (May take Tylenol)    ONLY if you are prescribed any types of blood thinners such as:  Aspirin, Coumadin, Plavix, Pradaxa, Xarelto, Aggrenox, Effient, Eliquis, Savasya, Brilinta, or any other, ask your surgeon whether you should stop taking them and how long before surgery you should stop.  You may also need to verify with the prescribing physician if it is ok to stop your medication.      INSTRUCTIONS IMPORTANT!!  Do not eat or drink anything between midnight and the time of your procedure- this includes gum, mints, and candy.  You may have clear liquids such as:  WATER, BLACK COFFEE, UNSWEET TEA, OR GATORADE (NO RED OR PURPLE) UP TO 2 HOURS PRIOR TO YOUR ARRIVAL TIME.  Do not smoke or drink alcoholic beverages 24 hours prior to your procedure.  Shower the night before AND the morning of your procedure with a Chlorhexidine wash such as Hibiclens or Dial antibacterial soap from the neck down.  Do not get it on your face or in your eyes.  You may use your own shampoo and face wash. This  helps your skin to be as bacteria free as possible.    If you wear contact lenses, dentures, hearing aids or glasses, bring a container to put them in during surgery and give to a family member for safe keeping.  Please leave all jewelry, piercing's and valuables at home. You must remove your false eyelashes prior to surgery.    DO NOT remove hair from the surgery site.  Do not shave the incision site unless you are given specific instructions to do so.    ONLY if you have been diagnosed with sleep apnea please bring your C-PAP machine.  ONLY if you wear home oxygen please bring your portable oxygen tank the day of your procedure.  ONLY if you have a history of OPEN HEART SURGERY you will need a clearance from your Cardiologist per Anesthesia.      ONLY for patients requiring bowel prep, written instructions will be given by your doctor's office.  ONLY if you have a neuro stimulator, please bring the controller with you the morning of surgery  ONLY if a type and screen test is needed before surgery, please return:  If your doctor has scheduled you for an overnight stay, bring a small overnight bag with any personal items you need.  Make arrangements in advance for transportation home by a responsible adult. You can not go home in an uber or a cab per hospital policy.  It is not safe to drive a vehicle during the 24 hours after anesthesia.          All  facilities and properties are tobacco free.  Smoking is NOT allowed.   If you have any questions about these instructions, call Pre-Op Admit  Nursing at [phone] or the Pre-Op Day Surgery Unit at [phone].

## 2024-10-17 NOTE — PRE ADMISSION SCREENING
Patient Name: Mignon Hardy  YOB: 1965   MRN: [id number]     [facility]   Basic Mobility Inpatient Short Form 6 Clicks         How much difficulty does the patient currently have  Unable  A Lot  A Little  None      1. Turning over in bed (including adjusting bedclothes, sheets and blankets)?     1 []    2 []    3 [x]    4 []        2. Sitting down on and standing up from a chair with arms (e.g., wheelchair, bedside commode, etc.)     1 []  2 []  3 [x]     4 []      3. Moving from lying on back to sitting on the side of the bed?     1 []  2 []  3 [x]    4 []    How much help from another person does the patient currently need  Total  A Lot  A Little  None      4. Moving to and from a bed to a chair (including a wheelchair)?    1 []  2 []  3 []    4 [x]      5. Need to walk in hospital room?    1 []  2 []  3 []    4 [x]      6. Climbing 3-5 steps with a railing?    1 []  2 []  3 []    4 [x]       Raw Score:      21            CMS 0-100% Score:    28.97        %   Standardized Score:    50.25           CMS Modifier:     CJ                                     [facility]   Basic Mobility Inpatient Short Form 6 Clicks Score Conversion Table*         *Use this form to convert -PAC Basic Mobility Inpatient Raw Scores.   [facility] Inpatient Basic Mobility Short Form Scoring Example   1. Add the number values associated with the response to each item. For example, items totals yield a Raw Score of 21.   2. Match the raw score to the t-Scale scores (t-Scale score = 50.25, SE = 4.69).   3. Find the associated CMS % (CMS % = 28.97%).   4. Locate the correct CMS Functional Modifier Code, or G Code (G code = CJ)     NOTE: Each -PAC Short Form has a separate conversion table. Make sure that you use the correct conversion table.       Instruction Manual - page 45 contains conversion table

## 2024-10-17 NOTE — PRE ADMISSION SCREENING
"               [facility] Risk Assessment Scale    Patient Name: Mignon Hardy  YOB: 1965  MRN: [id number]            RIsk Factor Measure Recommendation Patient Data Scale/Score   BMI >40 Reconsider surgery, weight loss   Estimated body mass index is 35.58 kg/m² as calculated from the following:    Height as of 10/2/24: 5' 9" (1.753 m).    Weight as of 10/2/24: 109.3 kg (240 lb 15.4 oz).   [] 0 = 1 - 24.9  [] 1 = 25-29.9  [] 2 = 30-34.9  [x] 3 = 35-39.9  [] 4 = 40-44.9  [] 5 = 45-99.9   Hemoglobin AIC (if applicable) >9 Delay surgery until DM under control  Refer for:  Nutrition Therapy  Exercise   Medication    Lab Results   Component Value Date    HGBA1C 5.7 (H) 09/25/2023       Lab Results   Component Value Date    GLU 88 10/17/2024      [] 0 = 4.0-5.6  [x] 1 = 5.7-6.4  [] 2 = 6.5-6.9  [] 3 = 7.0-7.9  [] 4 = 8.0-8.9  [] 5 = 9.0-12   Hemoglobin (Anemia) <9 Delay surgery   Correct anemia Lab Results   Component Value Date    HGB 11.7 (L) 10/17/2024    [] 20 - <9.0                    Albumin <3 Delay surgery &Workup Lab Results   Component Value Date    ALBUMIN 4.4 01/03/2008    [] 20 - <3.0   Smoking Cessation >4 Weeks Delay Surgery  Refer to OP Cessation Class    Never Smoker [] 20 - current smoker                                _____ PPD                    Hx of MI, PE, Arrhythmia, CVA, DVT <30 Days Delay Surgery    N/A [] 20      Infection Variable Delay surgery and re-evaluate   N/A [] 20 - recent/current infection     Depression (PHQ) >10 out of 27 Delay Surgery and re-evaluate  Medication  Counseling              [x] 0     []1     []2     []3      []4      [] 5                    (1-4)      (5-9)  (10-14)  (15-19)   (20-27)     Memory Impairment & Memory loss (Mini-Cog Screening Tool) Advanced dementia and/or Parkinson's Reconsider surgery     [x] 0     []1     []2     []3     []4     [] 5     Physical Conditioning (Modified AM-PAC Per Physical Therapy at [facility]) Unable to ambulate on day of " surgery Delay surgery and re-evaluate  Pre-Rehabilitation   (PT evaluation)       []  0   [x]4       []8     []12        []16     []20       (<20%)   (<40%)   (<60%)   (<80% )    (>80%)     Home Environment/Caregiver support  (Per /Navigator Interview)    Availability of basic services and/or approprate assistance during post-operative period Delay surgery and re-evaluate  Safe home environment  Health   1 week post-surgery  Transportation  availability  Ability to obtain DME/Medications post-op    [x] 0     []1     []2     []3     []4     [] 5  [x] 0     []1     []2     []3     []4     [] 5  [x] 0     []1     []2     []3     []4     [] 5  [x] 0     []1     []2     []3     []4     [] 5         MD Contact: Dr. Austin Comments:  Total Score:  8

## 2024-10-17 NOTE — PRE ADMISSION SCREENING
JOINT CAMP ASSESSMENT    Name Mignon Hardy   MRN [id number]    Age/Sex 59 y.o. female    Surgeon Dr. Brian Austin   Joint Camp Date 10/17/2024   Surgery Date 10/29/2024   Procedure Right Knee Arthroplasty   Insurance Payor: MEDICAID / Plan: [place] / Product Type: Managed Medicaid /    Care Team Patient Care Team:  Wagner Rondon MD as PCP - General ([facility] Medicine)    Pharmacy   [address] [address]  Phone: [phone] Fax: [phone]     AM-PAC Score   21   Risk Assessment Score 8     Past Medical History:   Diagnosis Date    Arthritis     Bilateral Knee Arthritis    Chronic bilateral low back pain with left-sided sciatica 02/10/2021    Digestive disorder     Essential tremor 02/10/2021    Heart disease     Hypertension     Obesity     Thyroid disease     NODULES       Past Surgical History:   Procedure Laterality Date    CARPAL TUNNEL RELEASE Right 05/09/2023    Procedure: Right carpal tunnel release;  Surgeon: Kash Sahni MD;  Location: [facility] OR;  Service: Orthopedics;  Laterality: Right;    CARPAL TUNNEL RELEASE Left 08/03/2023    Procedure: Left carpal tunnel release;  Surgeon: Kash Sahni MD;  Location: [facility] OR;  Service: Orthopedics;  Laterality: Left;    CERVICAL DISCECTOMY      EPIDURAL STEROID INJECTION INTO CERVICAL SPINE N/A 06/07/2023    Procedure: Injection-steroid-epidural-cervical C7-T1;  Surgeon: Aileen Ocampo DO;  Location: [facility] PAIN MANAGEMENT;  Service: Pain Management;  Laterality: N/A;    EPIDURAL STEROID INJECTION INTO CERVICAL SPINE N/A 08/14/2024    Procedure: Injection-steroid-epidural-cervical  C7/T1;  Surgeon: Iván Qureshi MD;  Location: [facility] OR;  Service: Pain Management;  Laterality: N/A;    ESOPHAGEAL DILATION N/A 05/20/2022    Procedure: DILATION, ESOPHAGUS;  Surgeon: Jose Aguilera MD;  Location: [facility] ENDO;  Service: Endoscopy;   Laterality: N/A;  [place]    ESOPHAGOGASTRODUODENOSCOPY N/A 05/20/2022    Procedure: EGD (ESOPHAGOGASTRODUODENOSCOPY);  Surgeon: Jose Aguilera MD;  Location: [facility];  Service: Endoscopy;  Laterality: N/A;    HYSTEROSCOPY WITH DILATION AND CURETTAGE OF UTERUS N/A 10/20/2023    Procedure: HYSTEROSCOPY, WITH DILATION AND CURETTAGE OF UTERUS;  Surgeon: Noa Farias MD;  Location: [facility] OR;  Service: OB/GYN;  Laterality: N/A;    REMOVAL-MASS      of thyroid    ROBOTIC ARTHROPLASTY, KNEE Left 08/20/2024    Procedure: ROBOTIC ARTHROPLASTY, KNEE, TOTAL;  Surgeon: Brian Austin MD;  Location: [facility];  Service: Orthopedics;  Laterality: Left;  armando    TUBAL LIGATION           Home Enviroment     Living Arrangement: Lives with spouse  Home Environment: 1-story house/ trailer, number of outside stairs: 5 with a railing, tub-shower  Home Safety Concerns: Pets in the home: dogs (10).    DISCHARGE CAREGIVER/SUPPORT SYSTEM     Identified post-op caregiver: Patient has spouse / significant other.  Patient's caregiver(s) will be able to provide physical assistance. Patient will have someone to assist overnight.      Caregiver present at pre-op interview:  No      PRE-OPERATIVE FUNCTIONAL STATUS     Employment: Employed full time    Pre-op Functional Status: Patient is independent with mobility/ambulation, transfers, ADL's, IADL's.    Use of assistive device for ambulation: straight cane  ADL: self care  ADL Limitations: difficulty with walking  Medical Restrictions: Unstable ambulation and Decreased range of motions in extremities    POTENTIAL BARRIERS TO DISCHARGE/POTENTIAL POST-OP COMPLICATIONS     Patient with hx of HTN, heart disease, obesity. Patient had left knee arthroplasty on 8/20/2024 without complication. POSSIBLE SAME DAY DISCHARGE.    DISCHARGE PLAN     Expected LOS of 1 days or less for joint replacement discussed with patient.     Patient in agreement with discharge plan: Yes    Discharge to:  Outpatient PT and Home with 24 hour assistance     HH:  None per insurance.      OP PT: [facility] (JAQUELIN Herzog).     Home DME: rolling walker, single point cane, bedside commode, and tub transfer bench    Needed DME at D/C: none     Rx: Per Dr. Austin at discharge     Meds to Beds: Yes  Patient expected to discharge on Aspirin 81mg by mouth twice daily for DVT prophylaxis.

## 2024-10-18 DIAGNOSIS — M17.10 ARTHRITIS OF KNEE: Primary | ICD-10-CM

## 2024-10-21 ENCOUNTER — ANESTHESIA EVENT (OUTPATIENT)
Dept: SURGERY | Facility: HOSPITAL | Age: 59
End: 2024-10-21
Payer: MEDICAID

## 2024-10-21 RX ORDER — OXYCODONE AND ACETAMINOPHEN 5; 325 MG/1; MG/1
1 TABLET ORAL EVERY 6 HOURS PRN
Qty: 28 TABLET | Refills: 0 | Status: SHIPPED | OUTPATIENT
Start: 2024-10-21

## 2024-10-21 RX ORDER — ACETAMINOPHEN 500 MG
1000 TABLET ORAL EVERY 8 HOURS PRN
Qty: 30 TABLET | Refills: 0 | Status: SHIPPED | OUTPATIENT
Start: 2024-10-21

## 2024-10-21 RX ORDER — ASPIRIN 81 MG/1
81 TABLET ORAL 2 TIMES DAILY
Qty: 56 TABLET | Refills: 0 | Status: SHIPPED | OUTPATIENT
Start: 2024-10-21 | End: 2025-10-21

## 2024-10-21 RX ORDER — ONDANSETRON 4 MG/1
4 TABLET, FILM COATED ORAL EVERY 6 HOURS PRN
Qty: 30 TABLET | Refills: 0 | Status: SHIPPED | OUTPATIENT
Start: 2024-10-21

## 2024-10-21 RX ORDER — CYCLOBENZAPRINE HCL 10 MG
10 TABLET ORAL 3 TIMES DAILY PRN
Qty: 30 TABLET | Refills: 0 | Status: SHIPPED | OUTPATIENT
Start: 2024-10-21 | End: 2024-11-01

## 2024-10-21 RX ORDER — IBUPROFEN 600 MG/1
600 TABLET ORAL 3 TIMES DAILY PRN
Qty: 30 TABLET | Refills: 0 | Status: SHIPPED | OUTPATIENT
Start: 2024-10-21

## 2024-10-22 ENCOUNTER — HOSPITAL ENCOUNTER (OUTPATIENT)
Facility: HOSPITAL | Age: 59
Discharge: HOME | End: 2024-10-22
Attending: ORTHOPAEDIC SURGERY | Admitting: ORTHOPAEDIC SURGERY
Payer: MEDICAID

## 2024-10-22 ENCOUNTER — PATIENT MESSAGE (OUTPATIENT)
Dept: SURGERY | Facility: HOSPITAL | Age: 59
End: 2024-10-22

## 2024-10-22 ENCOUNTER — ANESTHESIA (OUTPATIENT)
Dept: SURGERY | Facility: HOSPITAL | Age: 59
End: 2024-10-22
Payer: MEDICAID

## 2024-10-22 DIAGNOSIS — M17.10 ARTHRITIS OF KNEE: ICD-10-CM

## 2024-10-22 DIAGNOSIS — Z01.818 PRE-OP TESTING: ICD-10-CM

## 2024-10-22 PROCEDURE — 63600175 PHARM REV CODE 636 W HCPCS: Performed by: ANESTHESIOLOGY

## 2024-10-22 PROCEDURE — 97116 GAIT TRAINING THERAPY: CPT

## 2024-10-22 PROCEDURE — C1776 JOINT DEVICE (IMPLANTABLE): HCPCS | Performed by: ORTHOPAEDIC SURGERY

## 2024-10-22 PROCEDURE — 36000713 HC OR TIME LEV V EA ADD 15 MIN: Performed by: ORTHOPAEDIC SURGERY

## 2024-10-22 PROCEDURE — 71000039 HC RECOVERY, EACH ADD'L HOUR: Performed by: ORTHOPAEDIC SURGERY

## 2024-10-22 PROCEDURE — 36000712 HC OR TIME LEV V 1ST 15 MIN: Performed by: ORTHOPAEDIC SURGERY

## 2024-10-22 PROCEDURE — 71000015 HC POSTOP RECOV 1ST HR: Performed by: ORTHOPAEDIC SURGERY

## 2024-10-22 PROCEDURE — 0055T BONE SRGRY CMPTR CT/MRI IMAG: CPT | Mod: ,,, | Performed by: ORTHOPAEDIC SURGERY

## 2024-10-22 PROCEDURE — C1769 GUIDE WIRE: HCPCS | Performed by: ORTHOPAEDIC SURGERY

## 2024-10-22 PROCEDURE — 27000221 HC OXYGEN, UP TO 24 HOURS

## 2024-10-22 PROCEDURE — 63600175 PHARM REV CODE 636 W HCPCS: Performed by: ORTHOPAEDIC SURGERY

## 2024-10-22 PROCEDURE — 25000003 PHARM REV CODE 250: Performed by: ANESTHESIOLOGY

## 2024-10-22 PROCEDURE — D9220A PRA ANESTHESIA: Mod: ANES,,, | Performed by: ANESTHESIOLOGY

## 2024-10-22 PROCEDURE — 37000008 HC ANESTHESIA 1ST 15 MINUTES: Performed by: ORTHOPAEDIC SURGERY

## 2024-10-22 PROCEDURE — 97161 PT EVAL LOW COMPLEX 20 MIN: CPT

## 2024-10-22 PROCEDURE — 27447 TOTAL KNEE ARTHROPLASTY: CPT | Mod: 79,RT,, | Performed by: ORTHOPAEDIC SURGERY

## 2024-10-22 PROCEDURE — 37000009 HC ANESTHESIA EA ADD 15 MINS: Performed by: ORTHOPAEDIC SURGERY

## 2024-10-22 PROCEDURE — 63600175 PHARM REV CODE 636 W HCPCS: Performed by: NURSE ANESTHETIST, CERTIFIED REGISTERED

## 2024-10-22 PROCEDURE — 71000016 HC POSTOP RECOV ADDL HR: Performed by: ORTHOPAEDIC SURGERY

## 2024-10-22 PROCEDURE — 27201423 OPTIME MED/SURG SUP & DEVICES STERILE SUPPLY: Performed by: ORTHOPAEDIC SURGERY

## 2024-10-22 PROCEDURE — 25000003 PHARM REV CODE 250: Performed by: NURSE ANESTHETIST, CERTIFIED REGISTERED

## 2024-10-22 PROCEDURE — D9220A PRA ANESTHESIA: Mod: CRNA,,, | Performed by: NURSE ANESTHETIST, CERTIFIED REGISTERED

## 2024-10-22 PROCEDURE — 27200665 HC NERVE BLOCK NEEDLE/ CATHETER: Performed by: ANESTHESIOLOGY

## 2024-10-22 PROCEDURE — 97110 THERAPEUTIC EXERCISES: CPT

## 2024-10-22 PROCEDURE — 94761 N-INVAS EAR/PLS OXIMETRY MLT: CPT

## 2024-10-22 PROCEDURE — 64447 NJX AA&/STRD FEMORAL NRV IMG: CPT | Mod: 59,RT | Performed by: ANESTHESIOLOGY

## 2024-10-22 PROCEDURE — 25000003 PHARM REV CODE 250: Performed by: ORTHOPAEDIC SURGERY

## 2024-10-22 PROCEDURE — 71000033 HC RECOVERY, INTIAL HOUR: Performed by: ORTHOPAEDIC SURGERY

## 2024-10-22 PROCEDURE — 63600175 PHARM REV CODE 636 W HCPCS

## 2024-10-22 PROCEDURE — 27200688 HC TRAY, SPINAL-HYPER/ ISOBARIC: Performed by: ANESTHESIOLOGY

## 2024-10-22 PROCEDURE — C9290 INJ, BUPIVACAINE LIPOSOME: HCPCS | Performed by: ANESTHESIOLOGY

## 2024-10-22 PROCEDURE — 94799 UNLISTED PULMONARY SVC/PX: CPT

## 2024-10-22 PROCEDURE — C1713 ANCHOR/SCREW BN/BN,TIS/BN: HCPCS | Performed by: ORTHOPAEDIC SURGERY

## 2024-10-22 DEVICE — PATELLA
Type: IMPLANTABLE DEVICE | Site: KNEE | Status: FUNCTIONAL
Brand: TRIATHLON

## 2024-10-22 DEVICE — CEMENT BONE ANTIBIO SIMPLEX P: Type: IMPLANTABLE DEVICE | Site: KNEE | Status: FUNCTIONAL

## 2024-10-22 DEVICE — PRIMARY TIBIAL BASEPLATE
Type: IMPLANTABLE DEVICE | Site: KNEE | Status: FUNCTIONAL
Brand: TRIATHLON

## 2024-10-22 DEVICE — TIBIAL BEARING INSERT
Type: IMPLANTABLE DEVICE | Site: KNEE | Status: FUNCTIONAL
Brand: TRIATHLON

## 2024-10-22 DEVICE — CRUCIATE RETAINING FEMORAL
Type: IMPLANTABLE DEVICE | Site: KNEE | Status: FUNCTIONAL
Brand: TRIATHLON

## 2024-10-22 RX ORDER — MUPIROCIN 20 MG/G
OINTMENT TOPICAL
Status: DISCONTINUED | OUTPATIENT
Start: 2024-10-22 | End: 2024-10-22 | Stop reason: HOSPADM

## 2024-10-22 RX ORDER — SODIUM CHLORIDE, SODIUM LACTATE, POTASSIUM CHLORIDE, CALCIUM CHLORIDE 600; 310; 30; 20 MG/100ML; MG/100ML; MG/100ML; MG/100ML
INJECTION, SOLUTION INTRAVENOUS CONTINUOUS
Status: CANCELLED | OUTPATIENT
Start: 2024-10-22

## 2024-10-22 RX ORDER — TRANEXAMIC ACID 100 MG/ML
INJECTION, SOLUTION INTRAVENOUS
Status: DISCONTINUED | OUTPATIENT
Start: 2024-10-22 | End: 2024-10-22

## 2024-10-22 RX ORDER — BUPIVACAINE HYDROCHLORIDE 7.5 MG/ML
INJECTION, SOLUTION EPIDURAL; RETROBULBAR
Status: COMPLETED | OUTPATIENT
Start: 2024-10-22 | End: 2024-10-22

## 2024-10-22 RX ORDER — FENTANYL CITRATE 50 UG/ML
INJECTION, SOLUTION INTRAMUSCULAR; INTRAVENOUS
Status: DISCONTINUED | OUTPATIENT
Start: 2024-10-22 | End: 2024-10-22

## 2024-10-22 RX ORDER — CELECOXIB 100 MG/1
400 CAPSULE ORAL ONCE
Status: COMPLETED | OUTPATIENT
Start: 2024-10-22 | End: 2024-10-22

## 2024-10-22 RX ORDER — OXYCODONE HCL 10 MG/1
10 TABLET, FILM COATED, EXTENDED RELEASE ORAL ONCE
Status: COMPLETED | OUTPATIENT
Start: 2024-10-22 | End: 2024-10-22

## 2024-10-22 RX ORDER — PROPOFOL 10 MG/ML
VIAL (ML) INTRAVENOUS
Status: DISCONTINUED | OUTPATIENT
Start: 2024-10-22 | End: 2024-10-22

## 2024-10-22 RX ORDER — LIDOCAINE HYDROCHLORIDE 20 MG/ML
INJECTION INTRAVENOUS
Status: DISCONTINUED | OUTPATIENT
Start: 2024-10-22 | End: 2024-10-22

## 2024-10-22 RX ORDER — BUPIVACAINE HYDROCHLORIDE 5 MG/ML
INJECTION, SOLUTION EPIDURAL; INTRACAUDAL
Status: COMPLETED | OUTPATIENT
Start: 2024-10-22 | End: 2024-10-22

## 2024-10-22 RX ORDER — METOCLOPRAMIDE HYDROCHLORIDE 5 MG/ML
10 INJECTION INTRAMUSCULAR; INTRAVENOUS EVERY 10 MIN PRN
Status: DISCONTINUED | OUTPATIENT
Start: 2024-10-22 | End: 2024-10-22 | Stop reason: HOSPADM

## 2024-10-22 RX ORDER — ONDANSETRON HYDROCHLORIDE 2 MG/ML
INJECTION, SOLUTION INTRAMUSCULAR; INTRAVENOUS
Status: DISCONTINUED | OUTPATIENT
Start: 2024-10-22 | End: 2024-10-22

## 2024-10-22 RX ORDER — LIDOCAINE HYDROCHLORIDE 10 MG/ML
1 INJECTION, SOLUTION EPIDURAL; INFILTRATION; INTRACAUDAL; PERINEURAL ONCE
Status: COMPLETED | OUTPATIENT
Start: 2024-10-22 | End: 2024-10-22

## 2024-10-22 RX ORDER — MIDAZOLAM HYDROCHLORIDE 1 MG/ML
INJECTION INTRAMUSCULAR; INTRAVENOUS
Status: DISCONTINUED | OUTPATIENT
Start: 2024-10-22 | End: 2024-10-22

## 2024-10-22 RX ORDER — OXYCODONE HYDROCHLORIDE 5 MG/1
5 TABLET ORAL
Status: DISCONTINUED | OUTPATIENT
Start: 2024-10-22 | End: 2024-10-22 | Stop reason: HOSPADM

## 2024-10-22 RX ORDER — CEFAZOLIN 2 G/1
2 INJECTION, POWDER, FOR SOLUTION INTRAMUSCULAR; INTRAVENOUS
Status: COMPLETED | OUTPATIENT
Start: 2024-10-22 | End: 2024-10-22

## 2024-10-22 RX ORDER — PROPOFOL 10 MG/ML
VIAL (ML) INTRAVENOUS CONTINUOUS PRN
Status: DISCONTINUED | OUTPATIENT
Start: 2024-10-22 | End: 2024-10-22

## 2024-10-22 RX ORDER — DEXMEDETOMIDINE HYDROCHLORIDE 100 UG/ML
INJECTION, SOLUTION INTRAVENOUS
Status: DISCONTINUED | OUTPATIENT
Start: 2024-10-22 | End: 2024-10-22

## 2024-10-22 RX ORDER — DEXAMETHASONE SODIUM PHOSPHATE 4 MG/ML
INJECTION, SOLUTION INTRA-ARTICULAR; INTRALESIONAL; INTRAMUSCULAR; INTRAVENOUS; SOFT TISSUE
Status: DISCONTINUED | OUTPATIENT
Start: 2024-10-22 | End: 2024-10-22

## 2024-10-22 RX ORDER — ACETAMINOPHEN 500 MG
1000 TABLET ORAL ONCE
Status: COMPLETED | OUTPATIENT
Start: 2024-10-22 | End: 2024-10-22

## 2024-10-22 RX ORDER — FENTANYL CITRATE 50 UG/ML
25 INJECTION, SOLUTION INTRAMUSCULAR; INTRAVENOUS EVERY 5 MIN PRN
Status: DISCONTINUED | OUTPATIENT
Start: 2024-10-22 | End: 2024-10-22 | Stop reason: HOSPADM

## 2024-10-22 RX ORDER — EPHEDRINE SULFATE 50 MG/ML
INJECTION, SOLUTION INTRAVENOUS
Status: DISCONTINUED | OUTPATIENT
Start: 2024-10-22 | End: 2024-10-22

## 2024-10-22 RX ADMIN — PROPOFOL 75 MCG/KG/MIN: 10 INJECTION, EMULSION INTRAVENOUS at 07:10

## 2024-10-22 RX ADMIN — BUPIVACAINE HYDROCHLORIDE 1.4 ML: 7.5 INJECTION, SOLUTION EPIDURAL; RETROBULBAR at 07:10

## 2024-10-22 RX ADMIN — ONDANSETRON 4 MG: 2 INJECTION INTRAMUSCULAR; INTRAVENOUS at 06:10

## 2024-10-22 RX ADMIN — DEXMEDETOMIDINE HYDROCHLORIDE 4 MCG: 100 INJECTION, SOLUTION INTRAVENOUS at 08:10

## 2024-10-22 RX ADMIN — LIDOCAINE HYDROCHLORIDE 0.1 MG: 10 INJECTION, SOLUTION EPIDURAL; INFILTRATION; INTRACAUDAL; PERINEURAL at 06:10

## 2024-10-22 RX ADMIN — CELECOXIB 400 MG: 100 CAPSULE ORAL at 06:10

## 2024-10-22 RX ADMIN — MIDAZOLAM HYDROCHLORIDE 1 MG: 1 INJECTION, SOLUTION INTRAMUSCULAR; INTRAVENOUS at 07:10

## 2024-10-22 RX ADMIN — EPHEDRINE SULFATE 10 MG: 50 INJECTION, SOLUTION INTRAMUSCULAR; INTRAVENOUS; SUBCUTANEOUS at 08:10

## 2024-10-22 RX ADMIN — SODIUM CHLORIDE, SODIUM GLUCONATE, SODIUM ACETATE, POTASSIUM CHLORIDE AND MAGNESIUM CHLORIDE: 526; 502; 368; 37; 30 INJECTION, SOLUTION INTRAVENOUS at 09:10

## 2024-10-22 RX ADMIN — FENTANYL CITRATE 50 MCG: 0.05 INJECTION, SOLUTION INTRAMUSCULAR; INTRAVENOUS at 06:10

## 2024-10-22 RX ADMIN — TRANEXAMIC ACID 900 MG: 100 INJECTION, SOLUTION INTRAVENOUS at 06:10

## 2024-10-22 RX ADMIN — ACETAMINOPHEN 1000 MG: 500 TABLET ORAL at 06:10

## 2024-10-22 RX ADMIN — OXYCODONE HYDROCHLORIDE 10 MG: 10 TABLET, FILM COATED, EXTENDED RELEASE ORAL at 06:10

## 2024-10-22 RX ADMIN — EPHEDRINE SULFATE 10 MG: 50 INJECTION, SOLUTION INTRAMUSCULAR; INTRAVENOUS; SUBCUTANEOUS at 07:10

## 2024-10-22 RX ADMIN — MUPIROCIN 1 G: 20 OINTMENT TOPICAL at 06:10

## 2024-10-22 RX ADMIN — PROPOFOL 30 MG: 10 INJECTION, EMULSION INTRAVENOUS at 07:10

## 2024-10-22 RX ADMIN — DEXAMETHASONE SODIUM PHOSPHATE 8 MG: 4 INJECTION, SOLUTION INTRA-ARTICULAR; INTRALESIONAL; INTRAMUSCULAR; INTRAVENOUS; SOFT TISSUE at 07:10

## 2024-10-22 RX ADMIN — PROPOFOL 50 MG: 10 INJECTION, EMULSION INTRAVENOUS at 07:10

## 2024-10-22 RX ADMIN — SODIUM CHLORIDE, SODIUM GLUCONATE, SODIUM ACETATE, POTASSIUM CHLORIDE AND MAGNESIUM CHLORIDE: 526; 502; 368; 37; 30 INJECTION, SOLUTION INTRAVENOUS at 06:10

## 2024-10-22 RX ADMIN — BUPIVACAINE 20 ML: 13.3 INJECTION, SUSPENSION, LIPOSOMAL INFILTRATION at 06:10

## 2024-10-22 RX ADMIN — MIDAZOLAM HYDROCHLORIDE 2 MG: 1 INJECTION, SOLUTION INTRAMUSCULAR; INTRAVENOUS at 06:10

## 2024-10-22 RX ADMIN — BUPIVACAINE HYDROCHLORIDE 10 ML: 5 INJECTION, SOLUTION EPIDURAL; INTRACAUDAL; PERINEURAL at 06:10

## 2024-10-22 RX ADMIN — LIDOCAINE HYDROCHLORIDE 20 MG: 20 INJECTION, SOLUTION INTRAVENOUS at 07:10

## 2024-10-22 RX ADMIN — ROPIVACAINE HYDROCHLORIDE: 5 INJECTION EPIDURAL; INFILTRATION; PERINEURAL at 07:10

## 2024-10-22 RX ADMIN — CEFAZOLIN 2 G: 2 INJECTION, POWDER, FOR SOLUTION INTRAMUSCULAR; INTRAVENOUS at 06:10

## 2024-10-22 NOTE — ANESTHESIA PREPROCEDURE EVALUATION
10/22/2024  Mignon Hardy is a 59 y.o., female.      Pre-op Assessment    I have reviewed the Patient Summary Reports.     I have reviewed the Nursing Notes. I have reviewed the NPO Status.   I have reviewed the Medications.     Review of Systems  Anesthesia Hx:  No problems with previous Anesthesia                Cardiovascular:     Hypertension                                    Hypertension         Pulmonary:  Pulmonary Normal                       Musculoskeletal:         Spine Disorders: lumbar and cervical            Neurological:    Neuromuscular Disease,       Essential tremor                          Neuromuscular Disease   Endocrine:        Obesity / BMI > 30      Physical Exam  General: Well nourished    Airway:  Mallampati: II   Mouth Opening: Normal  TM Distance: Normal  Neck ROM: Normal ROM        Anesthesia Plan  Type of Anesthesia, risks & benefits discussed:    Anesthesia Type: Spinal  Intra-op Monitoring Plan: Standard ASA Monitors  Post Op Pain Control Plan: multimodal analgesia, peripheral nerve block and IV/PO Opioids PRN  Induction:  IV  Informed Consent: Informed consent signed with the Patient and all parties understand the risks and agree with anesthesia plan.  All questions answered.   ASA Score: 2    Ready For Surgery From Anesthesia Perspective.     .

## 2024-10-22 NOTE — DISCHARGE SUMMARY
[facility]  Discharge Note  Short Stay    Procedure(s) (LRB):  ROBOTIC ARTHROPLASTY, KNEE, TOTAL (Right)      OUTCOME: Patient tolerated treatment/procedure well without complication and is now ready for discharge.    DISPOSITION: Home or Self Care    FINAL DIAGNOSIS:  <principal problem not specified>    FOLLOWUP: In clinic    DISCHARGE INSTRUCTIONS:    Discharge Procedure Orders   Diet general     Leave dressing on - Keep it clean, dry, and intact until clinic visit     Change dressing (specify)   Order Comments: Dressing change: If dressing loses its seal, change daily.     Call MD for:  temperature >100.4     Call MD for:  persistent nausea and vomiting     Call MD for:  severe uncontrolled pain     Call MD for:  difficulty breathing, headache or visual disturbances     Call MD for:  redness, tenderness, or signs of infection (pain, swelling, redness, odor or green/yellow discharge around incision site)     Call MD for:  hives     Call MD for:  persistent dizziness or light-headedness     Call MD for:  extreme fatigue     Keep surgical extremity elevated     Ice to affected area   Order Comments: using barrier between ice and skin (specify duration&frequency)     Weight bearing as tolerated     Activity as tolerated     Shower on day dressing removed (No bath)        TIME SPENT ON DISCHARGE: 5 minutes

## 2024-10-22 NOTE — OP NOTE
[facility]  Orthopedic Surgery  Operative Note    SUMMARY     Date of Procedure: 10/22/2024     Procedure: Procedure(s) (LRB):  ROBOTIC ARTHROPLASTY, KNEE, TOTAL (Right)       Surgeons and Role:     * Brian Austin MD - Primary    Assistant: Jacob SNYDER    Pre-Operative Diagnosis: Arthritis of knee [M17.10]  Pre-op testing [Z01.818]    Post-Operative Diagnosis: Post-Op Diagnosis Codes:     * Arthritis of knee [M17.10]     * Pre-op testing [Z01.818]    Anesthesia: Spinal    Complications: No    Estimated Blood Loss (EBL):25ml           Implants:   Implant Name Type Inv. Item Serial No.  Lot No. LRB No. Used Action   PIN BONE [id number] - [id number]  PIN BONE [id number]  [employer]. [id number] Right 1 Implanted and Explanted   PIN FIXATION BONE 140X3.2MM - [id number]  PIN FIXATION BONE 140X3.2MM  [employer]. [id number] Right 1 Implanted and Explanted   CEMENT BONE ANTIBIO SIMPLEX P - [id number]  CEMENT BONE ANTIBIO SIMPLEX P  [employer]. [id number] Right 1 Implanted   COMP FEM CRUCIATE MAYTE DZ 5 RT - [id number]  COMP FEM CRUCIATE MAYTE DZ 5 RT  [employer]. [id number] Right 1 Implanted   PATELLA TRIATHLON 33X9 SYMTRC - [id number]  PATELLA TRIATHLON 33X9 SYMTRC  [employer]. [id number] Right 1 Implanted   PRIMARY TIBIAL BASE 5. - [id number]  PRIMARY TIBIAL BASE 5.  [employer]. [id number] Right 1 Implanted   INSERT CONVTNL POLYETH 9MM 5 - [id number]  INSERT CONVTNL POLYETH 9MM 5  [employer]. [id number] Right 1 Implanted       Tourniquet time: 45min at 300mmHg    Specimens:   Specimen (24h ago, onward)      None                    Condition: Good    Disposition: PACU - hemodynamically stable.    Attestation: I was present and scrubbed for the entire procedure.    INDICATIONS FOR THE PROCEDURE: A 59F with a history of chronic   Right knee arthritis, had failed all conservative measures including cortisone   injections, PT,  viscosupplementation and activity modification. After a long   discussion, the patient wished to proceed with the procedure above.     PROCEDURE IN DETAIL: Risks, benefits and alternatives of the procedure were   explained to the patient including, but not limited to damage to nerves,   arteries or blood vessels. Also explained risk of infection, stiffness, DVT, PE,   polyethylene wear as well as anesthetic complications including seizure, stroke,   heart attack and death, understood this and signed informed consent. The   patient's Right knee was marked prior to coming to the Operating Room. The patient was brought to the operating room, placed on the operating table in a supine position.A  formal timeout was done in which correct patient, procedure and op site were all   correctly identified and confirmed by the entire operating team.  2gm Ancef was given prior to surgical incision. Spinal anesthesia was induced. The patient'sRight  lower extremity was prepped and   draped in normal sterile fashion. TheRight  leg was exsanguinated with an   Esmarch. Tourniquet was inflated up 300 mmHg. Standard anterior approach to   the knee was made. Medial parapatellar arthrotomy was made, leaving about 1/8   inch of tissue for later repair. Proximal medial tibial release was performed,   making sure not to release any of the MCL. We then   everted the patella and reflexed the knee. Fat pad was excised as well as some   of the ACL. Soft tissue off the distal anterior femur was removed for   visualization, so as to help prevent notching.  We started off by placing our femoral pins.  Two pins were placed about 2 centimeters proximal and 1 centimeter anterior to the medial epicondyle.  We then retracted down to the level of the tibial tubercle.    Two pins were placed just short of bicortically in the tibia.  We then hooked up our arays to our pins.  We placed 2 checkpoints.  One in the tibia and 1 in the femur.  We then took the  leg through range of motion.  We then began by marking off our bony points.  We did this 1st on the femur and then on the tibia.  After this we did ligament balancing of the knee 1st extension and then in flexion by using some spoons and an osteotome.  We then adjusted our bone cuts on the computer and once we liked our plan began by making our cuts.  The [employer] robotic assisted cutting arm was then brought in and then we made our femoral cuts and then turned our attention to the tibia.  Tibial cuts were made.  All of bone was removed as well as excess soft tissue.  Posterior osteophytes removed as well.  We then began to trial.  We placed a size 5 femur and a size 5 tibia with a size 9 polyethylene.  We were within 1 millimeter between our medial and lateral compartments in both flexion and extension.        We turned our attention to patella, caliper was used on the patella where it   measured . We set guide at 24 and made our 9-mm cut for polyethylene component, 33 was selected. Then drill holes were placed and the patellar button was placed.   This had good tracking. No need for a lateral release and with no hand tests,   it stayed centered the whole time. We then marked our tibial rotation. We then drilled our femoral lugs.  We then   removed everything except the size 5 tibial tray. We then checked our tibial tray   with a drop gian again and then marked our rotation and pinned it into place then   drilled, and then punched for our keel. Robot and pins were all removed. We then started preparing final   components on the back table. Anterior, medial and lateral structures were injected with our local   Cocktail. Bony surfaces   copiously irrigated with Pulsavac and then thoroughly dried. Once the cement   was the appropriate consistency, first the tibia and then the femur were   cemented into place, removing excess cement. A 9 trial was placed. The knee   was held in compression and then the patella was placed.  Cement was allowed to   cure. Once the cement was cured, we then removed excess cement. Again trialed   one final time, again seeing a 9. We then tapped into place the   final 9 meniscal bearing into place. After this was done, we then did our   diluted iodine soak for 3 minutes and then proceeded with closing.  Arthrotomy was closed using our StrataFix.   Subcutaneous tissue was closed using 2-0 Vicryl and skin was using a running 3-0   StrataFix and Dermabond.Instrument, sponge, and needle counts were correct prior to wound closure and at the conclusion of the case.  Sterile dressing was applied.   They were extubated, awakened and transferred from the Operating Room to the   Recovery Room in stable condition.

## 2024-10-22 NOTE — TRANSFER OF CARE
"Anesthesia Transfer of Care Note    Patient: Mignon Hardy    Procedure(s) Performed: Procedure(s) (LRB):  ROBOTIC ARTHROPLASTY, KNEE, TOTAL (Right)    Patient location: PACU    Anesthesia Type: spinal    Transport from OR: Transported from OR on room air with adequate spontaneous ventilation    Post pain: adequate analgesia    Post assessment: no apparent anesthetic complications and tolerated procedure well    Post vital signs: stable    Level of consciousness: awake    Nausea/Vomiting: no nausea/vomiting    Transfer of care protocol was followed    Last vitals: Visit Vitals  /69   Pulse 68   Temp 36.9 °C (98.4 °F) (Skin)   Resp 15   Ht 5' 9" (1.753 m)   Wt 109.5 kg (241 lb 6.5 oz)   LMP  (Approximate)   SpO2 100%   Breastfeeding No   BMI 35.65 kg/m²     "

## 2024-10-22 NOTE — ANESTHESIA PROCEDURE NOTES
Peripheral Block    Patient location during procedure: pre-op   Block not for primary anesthetic.  Reason for block: at surgeon's request and post-op pain management   Post-op Pain Location: Right knee pain   Start time: 10/22/2024 6:45 AM  Timeout: 10/22/2024 6:42 AM   End time: 10/22/2024 6:49 AM    Staffing  Authorizing Provider: Omid Fay MD  Performing Provider: Omid Fay MD    Staffing  Performed by: Omid Fay MD  Authorized by: Omid Fay MD    Preanesthetic Checklist  Completed: patient identified, IV checked, site marked, risks and benefits discussed, surgical consent, monitors and equipment checked, pre-op evaluation and timeout performed  Peripheral Block  Patient position: supine  Prep: ChloraPrep  Patient monitoring: heart rate, cardiac monitor, continuous pulse ox, continuous capnometry and frequent blood pressure checks  Block type: adductor canal  Laterality: right  Injection technique: single shot  Needle  Needle type: Stimuplex   Needle gauge: 21 G  Needle length: 4 in  Needle localization: anatomical landmarks and ultrasound guidance   -ultrasound image captured on disc.  Assessment  Injection assessment: negative aspiration, negative parasthesia and local visualized surrounding nerve  Paresthesia pain: none  Heart rate change: no  Slow fractionated injection: yes    Medications:    Medications: BUPivacaine liposome (PF) 1.3 % (13.3 mg/mL) suspension - Injection   20 mL - 10/22/2024 6:45:00 AM  bupivacaine (pf) (MARCAINE) injection 0.5% - Perineural   10 mL - 10/22/2024 6:45:00 AM    Additional Notes  VSS.  [facility] RN monitoring vitals throughout procedure.  Patient tolerated procedure well.

## 2024-10-22 NOTE — PLAN OF CARE
Released per anesthesia, when criteria met. VS WDL, Resp even and unlabored. IS reviewed and pt encouraged to continue independently. Dressing dry and intact, denies pain and tolerated sips of water without nausea.Moving lower extremities and lifting both legs, Due to void.

## 2024-10-22 NOTE — ANESTHESIA PROCEDURE NOTES
Spinal    Diagnosis: knee pain right  Patient location during procedure: OR  Start time: 10/22/2024 7:12 AM  Timeout: 10/22/2024 7:08 AM  End time: 10/22/2024 7:25 AM    Staffing  Authorizing Provider: Omid Fay MD  Performing Provider: Omid Fay MD    Staffing  Performed by: Omid Fay MD  Authorized by: Omid Fay MD    Preanesthetic Checklist  Completed: patient identified, IV checked, site marked, risks and benefits discussed, surgical consent, monitors and equipment checked, pre-op evaluation and timeout performed  Spinal Block  Patient position: sitting  Prep: ChloraPrep  Patient monitoring: cardiac monitor, continuous pulse ox and continuous capnometry  Approach: right paramedian  Location: L3-4  Injection technique: single shot  CSF Fluid: clear free-flowing CSF  Needle  Needle type: pencil-tip   Needle gauge: 25 G  Needle length: 3.5 in  Needle localization: anatomical landmarks  Additional Notes  Difficult positioning and landmarks  Attempted midline and paramedian  Medications:    Medications: bupivacaine (pf) (MARCAINE) injection 0.75% - Intraspinal   1.4 mL - 10/22/2024 7:25:00 AM

## 2024-10-22 NOTE — PLAN OF CARE
Patient cleared by PT to discharge to home.  Patient able to void with no problems noted.  Dressing to right knee remains clean dry and intact. Patient to follow up with Outpatient PT on 10/23/24 @ 11 am with [facility]. Medications delivered to bedside and reviewed with patient and spouse.  Discharge instructions given to pt and family/friend, verbalized understanding and questions answered. Handouts provided. Belongings given back to pt. IV removed- catheter intact. Discharge via wheelchair.

## 2024-10-22 NOTE — DISCHARGE INSTRUCTIONS
"Discharge Instructions: After Your Surgery/Procedure  Youve just had surgery. During surgery you were given medicine called anesthesia to keep you relaxed and free of pain. After surgery you may have some pain or nausea. This is common. Here are some tips for feeling better and getting well after surgery.     Stay on schedule with your medication.   Going home  Your doctor or nurse will show you how to take care of yourself when you go home. He or she will also answer your questions. Have an adult family member or friend drive you home.      For your safety we recommend these precaution for the first 24 hours after your procedure:  Do not drive or use heavy equipment.  Do not make important decisions or sign legal papers.  Do not drink alcohol.  Have someone stay with you, if needed. He or she can watch for problems and help keep you safe.  Your concentration, balance, coordination, and judgement may be impaired for many hours after anesthesia.  Use caution when ambulating or standing up.     You may feel weak and "washed out" after anesthesia and surgery.      Subtle residual effects of general anesthesia or sedation with regional / local anesthesia can last more than 24 hours.  Rest for the remainder of the day or longer if your Doctor/Surgeon has advised you to do so.  Although you may feel normal within the first 24 hours, your reflexes and mental ability may be impaired without you realizing it.  You may feel dizzy, lightheaded or sleepy for 24 hours or longer.      Be sure to go to all follow-up visits with your doctor. And rest after your surgery for as long as your doctor tells you to.  Coping with pain  If you have pain after surgery, pain medicine will help you feel better. Take it as told, before pain becomes severe. Also, ask your doctor or pharmacist about other ways to control pain. This might be with heat, ice, or relaxation. And follow any other instructions your surgeon or nurse gives you.  Tips " for taking pain medicine  To get the best relief possible, remember these points:  Pain medicines can upset your stomach. Taking them with a little food may help.  Most pain relievers taken by mouth need at least 20 to 30 minutes to start to work.  Taking medicine on a schedule can help you remember to take it. Try to time your medicine so that you can take it before starting an activity. This might be before you get dressed, go for a walk, or sit down for dinner.  Constipation is a common side effect of pain medicines. Call your doctor before taking any medicines such as laxatives or stool softeners to help ease constipation. Also ask if you should skip any foods. Drinking lots of fluids and eating foods such as fruits and vegetables that are high in fiber can also help. Remember, do not take laxatives unless your surgeon has prescribed them.  Drinking alcohol and taking pain medicine can cause dizziness and slow your breathing. It can even be deadly. Do not drink alcohol while taking pain medicine.  Pain medicine can make you react more slowly to things. Do not drive or run machinery while taking pain medicine.  Your health care provider may tell you to take acetaminophen to help ease your pain. Ask him or her how much you are supposed to take each day. Acetaminophen or other pain relievers may interact with your prescription medicines or other over-the-counter (OTC) drugs. Some prescription medicines have acetaminophen and other ingredients. Using both prescription and OTC acetaminophen for pain can cause you to overdose. Read the labels on your OTC medicines with care. This will help you to clearly know the list of ingredients, how much to take, and any warnings. It may also help you not take too much acetaminophen. If you have questions or do not understand the information, ask your pharmacist or health care provider to explain it to you before you take the OTC medicine.  Managing nausea  Some people have an  upset stomach after surgery. This is often because of anesthesia, pain, or pain medicine, or the stress of surgery. These tips will help you handle nausea and eat healthy foods as you get better. If you were on a special food plan before surgery, ask your doctor if you should follow it while you get better. These tips may help:  Do not push yourself to eat. Your body will tell you when to eat and how much.  Start off with clear liquids and soup. They are easier to digest.  Next try semi-solid foods, such as mashed potatoes, applesauce, and gelatin, as you feel ready.  Slowly move to solid foods. Dont eat fatty, rich, or spicy foods at first.  Do not force yourself to have 3 large meals a day. Instead eat smaller amounts more often.  Take pain medicines with a small amount of solid food, such as crackers or toast, to avoid nausea.     Call your surgeon if  You still have pain an hour after taking medicine. The medicine may not be strong enough.  You feel too sleepy, dizzy, or groggy. The medicine may be too strong.  You have side effects like nausea, vomiting, or skin changes, such as rash, itching, or hives.       If you have obstructive sleep apnea  You were given anesthesia medicine during surgery to keep you comfortable and free of pain. After surgery, you may have more apnea spells because of this medicine and other medicines you were given. The spells may last longer than usual.   At home:  Keep using the continuous positive airway pressure (CPAP) device when you sleep. Unless your health care provider tells you not to, use it when you sleep, day or night. CPAP is a common device used to treat obstructive sleep apnea.  Talk with your provider before taking any pain medicine, muscle relaxants, or sedatives. Your provider will tell you about the possible dangers of taking these medicines.  © 1743-7533 [employer]. [address] [place] [address], [place] [address]. All rights reserved. This information is  not intended as a substitute for professional medical care. Always follow your healthcare professional's instructions.          Using an Incentive Spirometer    An incentive spirometer is a device that helps you do deep breathing exercises. These exercises expand your lungs, aid in circulation, and help prevent pneumonia. Deep breathing exercises also help you breathe better and improve the function of your lungs by:  Keeping your lungs clear  Strengthening your breathing muscles  Helping prevent respiratory complications or problems  The incentive spirometer gives you a way to take an active part in recover. A nurse or therapist will teach you breathing exercises. To do these exercises, you will breathe in through your mouth and not your nose. The incentive spirometer only works correctly if you breathe in through your mouth.  Steps to clear lungs  Step 1. Exhale normally. Then, inhale normally.  Relax and breathe out.  Step 2. Place your lips tightly around the mouthpiece.  Make sure the device is upright and not tilted.  Step 3. Inhale as much air as you can through the mouthpiece (don't breath through your nose).  Inhale slowly and deeply.  Hold your breath long enough to keep the balls or disk raised for at least 3 to 5 seconds, or as instructed by your healthcare provider.  Some spirometers have an indicator to let you know that you are breathing in too fast. If the indicator goes off, breathe in more slowly.  Step 4. Repeat the exercise regularly.  Do this exercise every hour while you're awake, or as instructed by your healthcare provider.  If you were taught deep breathing and coughing exercises, do them regularly as instructed by your healthcare provider.           Post op instructions for prevention of DVT  What is deep vein thrombosis?  Deep vein thrombosis (DVT) is the medical term for blood clots in the deep veins of the leg.  These blood clots can be dangerous.  A DVT can block a blood vessel and keep  blood from getting where it needs to go.  Another problem is that the clot can travel to other parts of the body such as the lungs.  A clot that travels to the lungs is called a pulmonary embolus (PE) and can cause serious problems with breathing which can lead to death.  Am I at risk for DVT/PE?  If you are not very active, you are at risk of DVT.  Anyone confined to bed, sitting for long periods of time, recovering from surgery, etc. increases the risk of DVT.  Other risk factors are cancer diagnosis, certain medications, estrogen replacement in any form,older age, obesity, pregnancy, smoking, history of clotting disorders, and dehydration.  How will I know if I have a DVT?  Swelling in the lower leg  Pain  Warmth, redness, hardness or bulging of the vein  If you have any of these symptoms, call your doctors office right away.  Some people will not have any symptoms until the clot moves to the lungs.  What are the symptoms of a PE?  Panting, shortness of breath, or trouble breathing  Sharp, knife-like chest pain when you breathe  Coughing or coughing up blood  Rapid heartbeat  If you have any of these symptoms or get worse quickly, call 911 for emergency treatment.  How can I prevent a DVT?  Avoid long periods of inactivity and dont cross your legs--get up and walk around every hour or so.  Stay active--walking after surgery is highly encouraged.  This means you should get out of the house and walk in the neighborhood.  Going up and down stairs will not impair healing (unless advised against such activity by your doctor).    Drink plenty of noncaffeinated, nonalcoholic fluids each day to prevent dehydration.  Wear special support stockings, if they have been advised by your doctor.  If you travel, stop at least once an hour and walk around.  Avoid smoking (assistance with stopping is available through your healthcare provider)  Always notify your doctor if you are not able to follow the post operative  instructions that are given to you at the time of discharge.  It may be necessary to prescribe one of the medications available to prevent DVT.          Exparel(bupivacaine) has been injected to provide approximately 72 hours of reduced pain after your surgery.  Do not remove the bracelet for five days.  Report to your doctor as soon as possible if you experience any of the following:   Restlessness   Anxiety   Speech problems    Lightheadedness   Numbness and tingling of the mouth and lips   Seizures    Metallic taste   Blurred vision   Tremors    Twitching   Depression   Extreme drowsiness  Avoid additional use of local anesthetics (such as dental procedures) for five days (96 hours).          We hope your stay was comfortable as you heal now, mend and rest.    For we have enjoyed taking care of you by giving your our best.    And as you get better, by regaining your health and strength;   We count it as a privilege to have served you and hope your time at [facility] was well spent.      Thank  You!!!

## 2024-10-22 NOTE — ANESTHESIA POSTPROCEDURE EVALUATION
Anesthesia Post Evaluation    Patient: Mignon Hardy    Procedure(s) Performed: Procedure(s) (LRB):  ROBOTIC ARTHROPLASTY, KNEE, TOTAL (Right)    Final Anesthesia Type: spinal      Patient location during evaluation: PACU  Patient participation: Yes- Able to Participate  Level of consciousness: awake and alert  Post-procedure vital signs: reviewed and stable  Pain management: adequate  Airway patency: patent    PONV status at discharge: No PONV  Anesthetic complications: no      Cardiovascular status: blood pressure returned to baseline  Respiratory status: unassisted  Hydration status: euvolemic  Follow-up not needed.              Vitals Value Taken Time   /67 10/22/24 1045   Temp 36.6 °C (97.9 °F) 10/22/24 0950   Pulse 86 10/22/24 1045   Resp 16 10/22/24 1045   SpO2 98 % 10/22/24 1045         Event Time   Out of Recovery 10:03:00         Pain/Griffin Score: Pain Rating Prior to Med Admin: 4 (10/22/2024  6:10 AM)  Griffin Score: 10 (10/22/2024 10:00 AM)  Modified Griffin Score: 19 (10/22/2024 10:45 AM)

## 2024-10-22 NOTE — PT/OT/SLP EVAL
Physical Therapy Evaluation and Discharge Note    Patient Name:  Mignon Hardy   MRN:  [id number]    Recommendations:     Discharge Recommendations: Low Intensity Therapy  Discharge Equipment Recommendations: none   Barriers to discharge: None    Assessment:     Mignon Hardy is a 59 y.o. female admitted with a medical diagnosis of  R TKA 10-. Seen at post 05 and scheduled for discharge today. Pt with recent L TKA 9 weeks ago 08- and did well with recovery. Pt completed thera ex in supine with good execution. Good ability for bilateral  leg raises. Pt requiring min assist for mobility and sat EOB. Pt stood with RW and ambulated 250ft with no seated rest. Pt completed stair training 5 ascending and descending with min assist. Bathroom use with extra time with successful voiding. OOB chair and educated on HEP.    Recent Surgery: Procedure(s) (LRB):  ROBOTIC ARTHROPLASTY, KNEE, TOTAL (Right) Day of Surgery    Plan:     During this hospitalization, patient does not require further acute PT services.  Please re-consult if situation changes.      Subjective   Stated that she is recovering from spinal quicker this time and moving better  Chief Complaint: a little sore  Patient/Family Comments/goals:  get well  Pain/Comfort:  Pain Rating 1: 0/10    Patients cultural, spiritual, Mandaen conflicts given the current situation:      Living Environment:  Home with spouse  5 steps to enter with bilateral rails  Prior to admission, patients level of function was indep.  Equipment used at home: none.  DME owned (not currently used): rolling walker.  Upon discharge, patient will have assistance from family.    Objective:     Communicated with nurse Ayala prior to session.  Patient found HOB elevated with   upon PT entry to room.    General Precautions: Standard, fall    Orthopedic Precautions:RLE weight bearing as tolerated   Braces: N/A  Respiratory Status: Room air    Exams:  Postural Exam:  Patient  presented with the following abnormalities:    -       Rounded shoulders  -       Forward head  -       BMI 35.65  RLE ROM: Deficits: RK flexion 80*  RLE Strength: Deficits: 3/5  LLE ROM: WFL  LLE Strength: WFL    Functional Mobility:  Bed Mobility:     Scooting: minimum assistance  Supine to Sit: minimum assistance  Transfers:     Sit to Stand:  minimum assistance with rolling walker  Bed to Chair: minimum assistance with  rolling walker  using  Stand Pivot  Toilet Transfer: minimum assistance with  rolling walker  using  Stand Pivot  Gait: 250ft with RW min/CGA with RW  Stairs:  Pt ascended/descended 5 stair(s) with No Assistive Device with bilateral handrails with Minimal Assistance.     AM-PAC 6 CLICK MOBILITY  Total Score:18       Treatment and Education:  Patient was educated on the importance of OOB activity and functional mobility to negate negative effects of prolonged bed rest during hospitalization, safe transfers and ambulation, and D/C planning   Pt completed AP,QS/GS,SLR and HS- educated on HEP  Gait at hallways with RW and OOB chair  Bathroom use with successful voiding    AM-PAC 6 CLICK MOBILITY  Total Score:18     Patient left up in chair with  nurse Lucy notified and spouse present.    GOALS:   Multidisciplinary Problems       Physical Therapy Goals       Not on file                    History:     Past Medical History:   Diagnosis Date    Arthritis     Bilateral Knee Arthritis    Chronic bilateral low back pain with left-sided sciatica 02/10/2021    Digestive disorder     Essential tremor 02/10/2021    Heart disease     Hypertension     Obesity     Thyroid disease     NODULES       Past Surgical History:   Procedure Laterality Date    CARPAL TUNNEL RELEASE Right 05/09/2023    Procedure: Right carpal tunnel release;  Surgeon: Kash Sahni MD;  Location: [facility];  Service: Orthopedics;  Laterality: Right;    CARPAL TUNNEL RELEASE Left 08/03/2023    Procedure: Left carpal tunnel release;   Surgeon: Kash Sahni MD;  Location: [facility] OR;  Service: Orthopedics;  Laterality: Left;    CERVICAL DISCECTOMY      EPIDURAL STEROID INJECTION INTO CERVICAL SPINE N/A 06/07/2023    Procedure: Injection-steroid-epidural-cervical C7-T1;  Surgeon: Aileen Ocampo DO;  Location: [facility]IN MANAGEMENT;  Service: Pain Management;  Laterality: N/A;    EPIDURAL STEROID INJECTION INTO CERVICAL SPINE N/A 08/14/2024    Procedure: Injection-steroid-epidural-cervical  C7/T1;  Surgeon: Iván Qureshi MD;  Location: [facility] OR;  Service: Pain Management;  Laterality: N/A;    ESOPHAGEAL DILATION N/A 05/20/2022    Procedure: DILATION, ESOPHAGUS;  Surgeon: Jose Aguilera MD;  Location: [facility];  Service: Endoscopy;  Laterality: N/A;  [place]    ESOPHAGOGASTRODUODENOSCOPY N/A 05/20/2022    Procedure: EGD (ESOPHAGOGASTRODUODENOSCOPY);  Surgeon: Jose Aguilera MD;  Location: [facility];  Service: Endoscopy;  Laterality: N/A;    HYSTEROSCOPY WITH DILATION AND CURETTAGE OF UTERUS N/A 10/20/2023    Procedure: HYSTEROSCOPY, WITH DILATION AND CURETTAGE OF UTERUS;  Surgeon: Noa Farias MD;  Location: [facility];  Service: OB/GYN;  Laterality: N/A;    REMOVAL-MASS      of thyroid    ROBOTIC ARTHROPLASTY, KNEE Left 8/20/2024    Procedure: ROBOTIC ARTHROPLASTY, KNEE, TOTAL;  Surgeon: Brian Austin MD;  Location: [facility];  Service: Orthopedics;  Laterality: Left;  armando    TUBAL LIGATION         Time Tracking:     PT Received On: 10/22/24  PT Start Time: 1036     PT Stop Time: 1129  PT Total Time (min): 53 min     Billable Minutes: Evaluation 20, Gait Training 20, and Therapeutic Exercise 13      10/22/2024

## 2024-10-23 VITALS
WEIGHT: 241.38 LBS | OXYGEN SATURATION: 98 % | TEMPERATURE: 98 F | BODY MASS INDEX: 35.75 KG/M2 | DIASTOLIC BLOOD PRESSURE: 67 MMHG | HEART RATE: 86 BPM | RESPIRATION RATE: 16 BRPM | SYSTOLIC BLOOD PRESSURE: 130 MMHG | HEIGHT: 69 IN

## 2024-10-29 DIAGNOSIS — M54.2 CERVICALGIA: ICD-10-CM

## 2024-10-29 DIAGNOSIS — M47.812 CERVICAL SPONDYLOSIS: ICD-10-CM

## 2024-10-29 DIAGNOSIS — M54.50 LUMBAR BACK PAIN: ICD-10-CM

## 2024-10-29 RX ORDER — GABAPENTIN 300 MG/1
300 CAPSULE ORAL 3 TIMES DAILY
Qty: 90 CAPSULE | Refills: 0 | Status: SHIPPED | OUTPATIENT
Start: 2024-10-29

## 2024-10-30 DIAGNOSIS — M17.10 ARTHRITIS OF KNEE: Primary | ICD-10-CM

## 2024-10-31 ENCOUNTER — OFFICE VISIT (OUTPATIENT)
Dept: PAIN MEDICINE | Facility: CLINIC | Age: 59
End: 2024-10-31
Payer: MEDICAID

## 2024-10-31 VITALS
SYSTOLIC BLOOD PRESSURE: 119 MMHG | BODY MASS INDEX: 33.65 KG/M2 | DIASTOLIC BLOOD PRESSURE: 56 MMHG | WEIGHT: 227.88 LBS | HEART RATE: 86 BPM

## 2024-10-31 DIAGNOSIS — M54.50 LUMBAR BACK PAIN: ICD-10-CM

## 2024-10-31 DIAGNOSIS — M54.12 CERVICAL RADICULOPATHY: ICD-10-CM

## 2024-10-31 DIAGNOSIS — M54.2 CERVICALGIA: ICD-10-CM

## 2024-10-31 DIAGNOSIS — M47.812 CERVICAL SPONDYLOSIS: Primary | ICD-10-CM

## 2024-10-31 PROCEDURE — 99213 OFFICE O/P EST LOW 20 MIN: CPT | Mod: PBBFAC,PO | Performed by: PHYSICIAN ASSISTANT

## 2024-10-31 PROCEDURE — 99213 OFFICE O/P EST LOW 20 MIN: CPT | Mod: S$PBB,,, | Performed by: PHYSICIAN ASSISTANT

## 2024-10-31 PROCEDURE — 3078F DIAST BP <80 MM HG: CPT | Mod: CPTII,,, | Performed by: PHYSICIAN ASSISTANT

## 2024-10-31 PROCEDURE — 1160F RVW MEDS BY RX/DR IN RCRD: CPT | Mod: CPTII,,, | Performed by: PHYSICIAN ASSISTANT

## 2024-10-31 PROCEDURE — 99999 PR PBB SHADOW E&M-EST. PATIENT-LVL III: CPT | Mod: PBBFAC,,, | Performed by: PHYSICIAN ASSISTANT

## 2024-10-31 PROCEDURE — 3074F SYST BP LT 130 MM HG: CPT | Mod: CPTII,,, | Performed by: PHYSICIAN ASSISTANT

## 2024-10-31 PROCEDURE — 3008F BODY MASS INDEX DOCD: CPT | Mod: CPTII,,, | Performed by: PHYSICIAN ASSISTANT

## 2024-10-31 PROCEDURE — 1159F MED LIST DOCD IN RCRD: CPT | Mod: CPTII,,, | Performed by: PHYSICIAN ASSISTANT

## 2024-11-06 ENCOUNTER — OFFICE VISIT (OUTPATIENT)
Dept: ORTHOPEDICS | Facility: CLINIC | Age: 59
End: 2024-11-06
Payer: MEDICAID

## 2024-11-06 ENCOUNTER — HOSPITAL ENCOUNTER (OUTPATIENT)
Dept: RADIOLOGY | Facility: HOSPITAL | Age: 59
Discharge: HOME | End: 2024-11-06
Attending: ORTHOPAEDIC SURGERY
Payer: MEDICAID

## 2024-11-06 VITALS — HEIGHT: 69 IN | WEIGHT: 227.94 LBS | RESPIRATION RATE: 18 BRPM | BODY MASS INDEX: 33.76 KG/M2

## 2024-11-06 DIAGNOSIS — M17.10 ARTHRITIS OF KNEE: ICD-10-CM

## 2024-11-06 DIAGNOSIS — Z96.651 STATUS POST TOTAL RIGHT KNEE REPLACEMENT: Primary | ICD-10-CM

## 2024-11-06 PROCEDURE — 1160F RVW MEDS BY RX/DR IN RCRD: CPT | Mod: CPTII,,, | Performed by: ORTHOPAEDIC SURGERY

## 2024-11-06 PROCEDURE — 73560 X-RAY EXAM OF KNEE 1 OR 2: CPT | Mod: 26,RT,, | Performed by: RADIOLOGY

## 2024-11-06 PROCEDURE — 99999 PR PBB SHADOW E&M-EST. PATIENT-LVL I: CPT | Mod: PBBFAC,,, | Performed by: ORTHOPAEDIC SURGERY

## 2024-11-06 PROCEDURE — 1159F MED LIST DOCD IN RCRD: CPT | Mod: CPTII,,, | Performed by: ORTHOPAEDIC SURGERY

## 2024-11-06 PROCEDURE — 73560 X-RAY EXAM OF KNEE 1 OR 2: CPT | Mod: TC,PO,RT

## 2024-11-06 PROCEDURE — 99211 OFF/OP EST MAY X REQ PHY/QHP: CPT | Mod: PBBFAC,25,PO | Performed by: ORTHOPAEDIC SURGERY

## 2024-11-06 PROCEDURE — 99024 POSTOP FOLLOW-UP VISIT: CPT | Mod: ,,, | Performed by: ORTHOPAEDIC SURGERY

## 2024-11-06 NOTE — PROGRESS NOTES
Past Medical History:   Diagnosis Date    Arthritis     Bilateral Knee Arthritis    Chronic bilateral low back pain with left-sided sciatica 02/10/2021    Digestive disorder     Essential tremor 02/10/2021    Heart disease     Hypertension     Obesity     Thyroid disease     NODULES       Past Surgical History:   Procedure Laterality Date    CARPAL TUNNEL RELEASE Right 05/09/2023    Procedure: Right carpal tunnel release;  Surgeon: Kash Sahni MD;  Location: [facility] OR;  Service: Orthopedics;  Laterality: Right;    CARPAL TUNNEL RELEASE Left 08/03/2023    Procedure: Left carpal tunnel release;  Surgeon: Kash Sahni MD;  Location: [facility] OR;  Service: Orthopedics;  Laterality: Left;    CERVICAL DISCECTOMY      EPIDURAL STEROID INJECTION INTO CERVICAL SPINE N/A 06/07/2023    Procedure: Injection-steroid-epidural-cervical C7-T1;  Surgeon: Aileen Ocampo DO;  Location: [facility];  Service: Pain Management;  Laterality: N/A;    EPIDURAL STEROID INJECTION INTO CERVICAL SPINE N/A 08/14/2024    Procedure: Injection-steroid-epidural-cervical  C7/T1;  Surgeon: Iván Qureshi MD;  Location: [facility] OR;  Service: Pain Management;  Laterality: N/A;    ESOPHAGEAL DILATION N/A 05/20/2022    Procedure: DILATION, ESOPHAGUS;  Surgeon: Jose Aguilera MD;  Location: [facility];  Service: Endoscopy;  Laterality: N/A;  [place]    ESOPHAGOGASTRODUODENOSCOPY N/A 05/20/2022    Procedure: EGD (ESOPHAGOGASTRODUODENOSCOPY);  Surgeon: Jose Aguilera MD;  Location: [facility];  Service: Endoscopy;  Laterality: N/A;    HYSTEROSCOPY WITH DILATION AND CURETTAGE OF UTERUS N/A 10/20/2023    Procedure: HYSTEROSCOPY, WITH DILATION AND CURETTAGE OF UTERUS;  Surgeon: Noa Farias MD;  Location: [facility];  Service: OB/GYN;  Laterality: N/A;    REMOVAL-MASS      of thyroid    ROBOTIC ARTHROPLASTY, KNEE Left 8/20/2024    Procedure: ROBOTIC ARTHROPLASTY, KNEE, TOTAL;  Surgeon: Brian Austin MD;  Location:  [facility] OR;  Service: Orthopedics;  Laterality: Left;  armando    ROBOTIC ARTHROPLASTY, KNEE Right 10/22/2024    Procedure: ROBOTIC ARTHROPLASTY, KNEE, TOTAL;  Surgeon: Brian Austin MD;  Location: [facility] OR;  Service: Orthopedics;  Laterality: Right;  armando    TUBAL LIGATION         Current Outpatient Medications   Medication Sig    acetaminophen (TYLENOL) 500 MG tablet Take 2 tablets (1,000 mg total) by mouth every 8 (eight) hours as needed for Pain.    aspirin (ECOTRIN) 81 MG EC tablet Take 1 tablet (81 mg total) by mouth 2 (two) times daily.    calcium carbonate (OS-ADALBERTO) 500 mg calcium (1,250 mg) chewable tablet Take 1 tablet by mouth every morning.    ergocalciferol (ERGOCALCIFEROL) 50,000 unit Cap Take 50,000 Units by mouth as needed.    famotidine (PEPCID) 20 MG tablet Take 20 mg by mouth 2 (two) times daily.    furosemide (LASIX) 40 MG tablet Take 40 mg by mouth once daily.    gabapentin (NEURONTIN) 300 MG capsule Take 1 capsule (300 mg total) by mouth 3 (three) times daily.    ibuprofen (ADVIL,MOTRIN) 600 MG tablet Take 1 tablet (600 mg total) by mouth 3 (three) times daily.    ibuprofen (ADVIL,MOTRIN) 600 MG tablet Take 1 tablet (600 mg total) by mouth 3 (three) times daily as needed for Pain.    multivit-min/iron/folic acid/K (BARIATRIC MULTIVITAMINS ORAL) Take by mouth.    ondansetron (ZOFRAN) 4 MG tablet Take 1 tablet (4 mg total) by mouth every 6 (six) hours as needed for Nausea.    oxyCODONE-acetaminophen (PERCOCET) 5-325 mg per tablet Take 1 tablet by mouth every 6 (six) hours as needed for Pain.    oxyCODONE-acetaminophen (PERCOCET) 5-325 mg per tablet Take 1 tablet by mouth every 6 (six) hours as needed for Pain.    potassium chloride SA (K-DUR,KLOR-CON) 20 MEQ tablet potassium chloride ER 20 mEq tablet,extended release(part/cryst)   TAKE ONE TABLET BY MOUTH ONCE DAILY    vitamin D (VITAMIN D3) 1000 units Tab Take 2 tablets by mouth every morning.     No current facility-administered  medications for this visit.     Facility-Administered Medications Ordered in Other Visits   Medication    dextrose 50% injection 12.5 g    dextrose 50% injection 25 g    insulin regular injection 0-8 Units    lactated ringers infusion    LIDOcaine (PF) 10 mg/ml (1%) injection 10 mg       Review of patient's allergies indicates:  No Known Allergies    Family History   Problem Relation Name Age of Onset    Thyroid cancer Mother      Cancer Mother          breast    Dementia Mother      Heart disease Father      Tremor Father      Pacemaker/defibrilator Father      Thyroid cancer Sister      Ovarian cancer Maternal Aunt          20's    Cancer Maternal Grandmother      Colon cancer Neg Hx         Social History     Socioeconomic History    Marital status:    Tobacco Use    Smoking status: Never    Smokeless tobacco: Never   Substance and Sexual Activity    Alcohol use: Never    Drug use: Never    Sexual activity: Yes     Partners: Male     Birth control/protection: None     Social Drivers of Health     Financial Resource Strain: High Risk (3/25/2024)    Overall Financial Resource Strain (CARDIA)     Difficulty of Paying Living Expenses: Hard   Food Insecurity: Food Insecurity Present (3/25/2024)    Hunger Vital Sign     Worried About Running Out of Food in the Last Year: Often true     Ran Out of Food in the Last Year: Often true   Transportation Needs: No Transportation Needs (3/25/2024)    PRAPARE - Transportation     Lack of Transportation (Medical): No     Lack of Transportation (Non-Medical): No   Physical Activity: Insufficiently Active (3/25/2024)    Exercise Vital Sign     Days of Exercise per Week: 1 day     Minutes of Exercise per Session: 10 min   Stress: Stress Concern Present (3/25/2024)    [employer] - Occupational Stress Questionnaire     Feeling of Stress : To some extent   Housing Stability: High Risk (3/25/2024)    Housing Stability Vital Sign     Unable to Pay for  Housing in the Last Year: Yes     Number of Places Lived in the Last Year: 1     Unstable Housing in the Last Year: No       Chief Complaint: No chief complaint on file.      Date of surgery:  October 22, 2024    History of present illness:  59-year-old female underwent right robotic assisted total knee arthroplasty.  She is doing well.  Pain is a 7/10.  Doing outpatient physical therapy.  Just a little swelling and bruising.      Review of Systems:    Musculoskeletal:  See HPI        Physical Examination:    Vital Signs:  There were no vitals filed for this visit.    There is no height or weight on file to calculate BMI.    This a well-developed, well nourished patient in no acute distress.  They are alert and oriented and cooperative to examination.  Pt. walks without an antalgic gait.      Examination of the right knee shows well-healing surgical incision.  No erythema drainage.  No significant ecchymosis or swelling.  Range of motion is 0-100 degrees already.  No calf pain.    X-rays:  X-rays of the right knee are ordered and reviewed which show well-aligned right total knee arthroplasty without complication     Assessment::Status post right robotic assisted total knee arthroplasty    Plan:  I reviewed the x-rays with her today.  Patient is doing great.  We will continue with outpatient physical therapy.  Patient may get the incision wet.  I will see her back in a month.    This note was created using [employer] voice recognition software that occasionally misinterpreted phrases or words.

## 2024-11-13 DIAGNOSIS — Z96.651 STATUS POST TOTAL RIGHT KNEE REPLACEMENT: Primary | ICD-10-CM

## 2024-11-13 RX ORDER — CYCLOBENZAPRINE HCL 10 MG
10 TABLET ORAL 3 TIMES DAILY PRN
Qty: 30 TABLET | Refills: 0 | Status: SHIPPED | OUTPATIENT
Start: 2024-11-13 | End: 2024-11-23

## 2024-11-13 RX ORDER — IBUPROFEN 600 MG/1
600 TABLET ORAL 3 TIMES DAILY PRN
Qty: 30 TABLET | Refills: 0 | Status: SHIPPED | OUTPATIENT
Start: 2024-11-13

## 2024-11-13 RX ORDER — OXYCODONE AND ACETAMINOPHEN 5; 325 MG/1; MG/1
1 TABLET ORAL EVERY 6 HOURS PRN
Qty: 28 TABLET | Refills: 0 | Status: SHIPPED | OUTPATIENT
Start: 2024-11-13

## 2024-12-02 DIAGNOSIS — M47.812 CERVICAL SPONDYLOSIS: ICD-10-CM

## 2024-12-02 DIAGNOSIS — M54.2 CERVICALGIA: ICD-10-CM

## 2024-12-02 DIAGNOSIS — M54.50 LUMBAR BACK PAIN: ICD-10-CM

## 2024-12-03 RX ORDER — GABAPENTIN 300 MG/1
300 CAPSULE ORAL 3 TIMES DAILY
Qty: 90 CAPSULE | Refills: 0 | Status: SHIPPED | OUTPATIENT
Start: 2024-12-03

## 2024-12-04 ENCOUNTER — OFFICE VISIT (OUTPATIENT)
Dept: ORTHOPEDICS | Facility: CLINIC | Age: 59
End: 2024-12-04
Payer: MEDICAID

## 2024-12-04 VITALS — BODY MASS INDEX: 33.76 KG/M2 | WEIGHT: 227.94 LBS | HEIGHT: 69 IN

## 2024-12-04 DIAGNOSIS — Z96.651 STATUS POST TOTAL RIGHT KNEE REPLACEMENT: Primary | ICD-10-CM

## 2024-12-04 PROCEDURE — 99213 OFFICE O/P EST LOW 20 MIN: CPT | Mod: PBBFAC,PO | Performed by: ORTHOPAEDIC SURGERY

## 2024-12-04 PROCEDURE — 99999 PR PBB SHADOW E&M-EST. PATIENT-LVL III: CPT | Mod: PBBFAC,,, | Performed by: ORTHOPAEDIC SURGERY

## 2024-12-04 NOTE — PROGRESS NOTES
Past Medical History:   Diagnosis Date    Arthritis     Bilateral Knee Arthritis    Chronic bilateral low back pain with left-sided sciatica 02/10/2021    Digestive disorder     Essential tremor 02/10/2021    Heart disease     Hypertension     Obesity     Thyroid disease     NODULES       Past Surgical History:   Procedure Laterality Date    CARPAL TUNNEL RELEASE Right 05/09/2023    Procedure: Right carpal tunnel release;  Surgeon: Kash Sahni MD;  Location: [facility] OR;  Service: Orthopedics;  Laterality: Right;    CARPAL TUNNEL RELEASE Left 08/03/2023    Procedure: Left carpal tunnel release;  Surgeon: Kash Sahni MD;  Location: [facility] OR;  Service: Orthopedics;  Laterality: Left;    CERVICAL DISCECTOMY      EPIDURAL STEROID INJECTION INTO CERVICAL SPINE N/A 06/07/2023    Procedure: Injection-steroid-epidural-cervical C7-T1;  Surgeon: Aileen Ocampo DO;  Location: [facility] PAIN MANAGEMENT;  Service: Pain Management;  Laterality: N/A;    EPIDURAL STEROID INJECTION INTO CERVICAL SPINE N/A 08/14/2024    Procedure: Injection-steroid-epidural-cervical  C7/T1;  Surgeon: Iván Qureshi MD;  Location: [facility] OR;  Service: Pain Management;  Laterality: N/A;    ESOPHAGEAL DILATION N/A 05/20/2022    Procedure: DILATION, ESOPHAGUS;  Surgeon: Jose Aguilera MD;  Location: [facility];  Service: Endoscopy;  Laterality: N/A;  [place]    ESOPHAGOGASTRODUODENOSCOPY N/A 05/20/2022    Procedure: EGD (ESOPHAGOGASTRODUODENOSCOPY);  Surgeon: Jose Aguilera MD;  Location: [facility];  Service: Endoscopy;  Laterality: N/A;    HYSTEROSCOPY WITH DILATION AND CURETTAGE OF UTERUS N/A 10/20/2023    Procedure: HYSTEROSCOPY, WITH DILATION AND CURETTAGE OF UTERUS;  Surgeon: Noa Farias MD;  Location: [facility];  Service: OB/GYN;  Laterality: N/A;    REMOVAL-MASS      of thyroid    ROBOTIC ARTHROPLASTY, KNEE Left 8/20/2024    Procedure: ROBOTIC ARTHROPLASTY, KNEE, TOTAL;  Surgeon: Brian Austin MD;  Location:  [facility] OR;  Service: Orthopedics;  Laterality: Left;  armando    ROBOTIC ARTHROPLASTY, KNEE Right 10/22/2024    Procedure: ROBOTIC ARTHROPLASTY, KNEE, TOTAL;  Surgeon: Brian Austin MD;  Location: [facility] OR;  Service: Orthopedics;  Laterality: Right;  armando    TUBAL LIGATION         Current Outpatient Medications   Medication Sig    acetaminophen (TYLENOL) 500 MG tablet Take 2 tablets (1,000 mg total) by mouth every 8 (eight) hours as needed for Pain.    aspirin (ECOTRIN) 81 MG EC tablet Take 1 tablet (81 mg total) by mouth 2 (two) times daily.    calcium carbonate (OS-ADALBERTO) 500 mg calcium (1,250 mg) chewable tablet Take 1 tablet by mouth every morning.    ergocalciferol (ERGOCALCIFEROL) 50,000 unit Cap Take 50,000 Units by mouth as needed.    famotidine (PEPCID) 20 MG tablet Take 20 mg by mouth 2 (two) times daily.    furosemide (LASIX) 40 MG tablet Take 40 mg by mouth once daily.    gabapentin (NEURONTIN) 300 MG capsule Take 1 capsule (300 mg total) by mouth 3 (three) times daily.    ibuprofen (ADVIL,MOTRIN) 600 MG tablet Take 1 tablet (600 mg total) by mouth 3 (three) times daily.    ibuprofen (ADVIL,MOTRIN) 600 MG tablet Take 1 tablet (600 mg total) by mouth 3 (three) times daily as needed for Pain.    multivit-min/iron/folic acid/K (BARIATRIC MULTIVITAMINS ORAL) Take by mouth.    ondansetron (ZOFRAN) 4 MG tablet Take 1 tablet (4 mg total) by mouth every 6 (six) hours as needed for Nausea.    oxyCODONE-acetaminophen (PERCOCET) 5-325 mg per tablet Take 1 tablet by mouth every 6 (six) hours as needed for Pain.    oxyCODONE-acetaminophen (PERCOCET) 5-325 mg per tablet Take 1 tablet by mouth every 6 (six) hours as needed for Pain.    potassium chloride SA (K-DUR,KLOR-CON) 20 MEQ tablet potassium chloride ER 20 mEq tablet,extended release(part/cryst)   TAKE ONE TABLET BY MOUTH ONCE DAILY    vitamin D (VITAMIN D3) 1000 units Tab Take 2 tablets by mouth every morning.     No current facility-administered  medications for this visit.     Facility-Administered Medications Ordered in Other Visits   Medication    dextrose 50% injection 12.5 g    dextrose 50% injection 25 g    insulin regular injection 0-8 Units    lactated ringers infusion    LIDOcaine (PF) 10 mg/ml (1%) injection 10 mg       Review of patient's allergies indicates:  No Known Allergies    Family History   Problem Relation Name Age of Onset    Thyroid cancer Mother      Cancer Mother          breast    Dementia Mother      Heart disease Father      Tremor Father      Pacemaker/defibrilator Father      Thyroid cancer Sister      Ovarian cancer Maternal Aunt          20's    Cancer Maternal Grandmother      Colon cancer Neg Hx         Social History     Socioeconomic History    Marital status:    Tobacco Use    Smoking status: Never    Smokeless tobacco: Never   Substance and Sexual Activity    Alcohol use: Never    Drug use: Never    Sexual activity: Yes     Partners: Male     Birth control/protection: None     Social Drivers of Health     Financial Resource Strain: High Risk (3/25/2024)    Overall Financial Resource Strain (CARDIA)     Difficulty of Paying Living Expenses: Hard   Food Insecurity: Food Insecurity Present (3/25/2024)    Hunger Vital Sign     Worried About Running Out of Food in the Last Year: Often true     Ran Out of Food in the Last Year: Often true   Transportation Needs: No Transportation Needs (3/25/2024)    PRAPARE - Transportation     Lack of Transportation (Medical): No     Lack of Transportation (Non-Medical): No   Physical Activity: Insufficiently Active (3/25/2024)    Exercise Vital Sign     Days of Exercise per Week: 1 day     Minutes of Exercise per Session: 10 min   Stress: Stress Concern Present (3/25/2024)    [employer] - Occupational Stress Questionnaire     Feeling of Stress : To some extent   Housing Stability: High Risk (3/25/2024)    Housing Stability Vital Sign     Unable to Pay for  Housing in the Last Year: Yes     Number of Places Lived in the Last Year: 1     Unstable Housing in the Last Year: No       Chief Complaint:   Chief Complaint   Patient presents with    Post-op Evaluation     4 wk post op- R TKA       Date of surgery:  October 22, 2024    History of present illness:  59-year-old female underwent right robotic assisted total knee arthroplasty.  She is doing well.  Pain is a 4/10.  Doing outpatient physical therapy.  Has a little pain in the back of her knee.  They are working on hamstring stretches.      Review of Systems:    Musculoskeletal:  See HPI        Physical Examination:    Vital Signs:  There were no vitals filed for this visit.    Body mass index is 33.66 kg/m².    This a well-developed, well nourished patient in no acute distress.  They are alert and oriented and cooperative to examination.  Pt. walks without an antalgic gait.      Examination of the right knee shows healed surgical incision.  No erythema drainage.  No significant ecchymosis or swelling.  Range of motion is 0-110 degrees already.  No calf pain.    X-rays:  X-rays of the right knee are reviewed which show well-aligned right total knee arthroplasty without complication     Assessment::Status post right robotic assisted total knee arthroplasty    Plan:   We will continue with outpatient physical therapy.  Follow up in 6 weeks with x-rays of both knees.    This note was created using [employer] voice recognition software that occasionally misinterpreted phrases or words.

## 2024-12-10 ENCOUNTER — PATIENT MESSAGE (OUTPATIENT)
Dept: ORTHOPEDICS | Facility: CLINIC | Age: 59
End: 2024-12-10
Payer: MEDICAID

## 2024-12-10 DIAGNOSIS — Z96.651 STATUS POST TOTAL RIGHT KNEE REPLACEMENT: Primary | ICD-10-CM

## 2024-12-10 RX ORDER — AMOXICILLIN 500 MG/1
2000 TABLET, FILM COATED ORAL ONCE
Qty: 4 TABLET | Refills: 0 | Status: SHIPPED | OUTPATIENT
Start: 2024-12-10 | End: 2024-12-10

## 2025-01-07 DIAGNOSIS — Z96.651 STATUS POST TOTAL RIGHT KNEE REPLACEMENT: Primary | ICD-10-CM

## 2025-01-15 ENCOUNTER — HOSPITAL ENCOUNTER (OUTPATIENT)
Dept: RADIOLOGY | Facility: HOSPITAL | Age: 60
Discharge: HOME | End: 2025-01-15
Attending: ORTHOPAEDIC SURGERY
Payer: MEDICAID

## 2025-01-15 ENCOUNTER — OFFICE VISIT (OUTPATIENT)
Dept: ORTHOPEDICS | Facility: CLINIC | Age: 60
End: 2025-01-15
Payer: MEDICAID

## 2025-01-15 DIAGNOSIS — Z96.651 STATUS POST TOTAL RIGHT KNEE REPLACEMENT: Primary | ICD-10-CM

## 2025-01-15 DIAGNOSIS — Z96.651 STATUS POST TOTAL RIGHT KNEE REPLACEMENT: ICD-10-CM

## 2025-01-15 PROCEDURE — 1160F RVW MEDS BY RX/DR IN RCRD: CPT | Mod: CPTII,,, | Performed by: ORTHOPAEDIC SURGERY

## 2025-01-15 PROCEDURE — 99024 POSTOP FOLLOW-UP VISIT: CPT | Mod: S$PBB,,, | Performed by: ORTHOPAEDIC SURGERY

## 2025-01-15 PROCEDURE — 99999 PR PBB SHADOW E&M-EST. PATIENT-LVL I: CPT | Mod: PBBFAC,,, | Performed by: ORTHOPAEDIC SURGERY

## 2025-01-15 PROCEDURE — 73564 X-RAY EXAM KNEE 4 OR MORE: CPT | Mod: 26,50,, | Performed by: RADIOLOGY

## 2025-01-15 PROCEDURE — 1159F MED LIST DOCD IN RCRD: CPT | Mod: CPTII,,, | Performed by: ORTHOPAEDIC SURGERY

## 2025-01-15 PROCEDURE — 73564 X-RAY EXAM KNEE 4 OR MORE: CPT | Mod: TC,50,PO

## 2025-01-15 PROCEDURE — 99211 OFF/OP EST MAY X REQ PHY/QHP: CPT | Mod: PBBFAC,25,PO | Performed by: ORTHOPAEDIC SURGERY

## 2025-01-15 NOTE — PROGRESS NOTES
Past Medical History:   Diagnosis Date    Arthritis     Bilateral Knee Arthritis    Chronic bilateral low back pain with left-sided sciatica 02/10/2021    Digestive disorder     Essential tremor 02/10/2021    Heart disease     Hypertension     Obesity     Thyroid disease     NODULES       Past Surgical History:   Procedure Laterality Date    CARPAL TUNNEL RELEASE Right 05/09/2023    Procedure: Right carpal tunnel release;  Surgeon: Kash Sahni MD;  Location: [facility] OR;  Service: Orthopedics;  Laterality: Right;    CARPAL TUNNEL RELEASE Left 08/03/2023    Procedure: Left carpal tunnel release;  Surgeon: Kash Sahni MD;  Location: [facility] OR;  Service: Orthopedics;  Laterality: Left;    CERVICAL DISCECTOMY      EPIDURAL STEROID INJECTION INTO CERVICAL SPINE N/A 06/07/2023    Procedure: Injection-steroid-epidural-cervical C7-T1;  Surgeon: Aileen Ocampo DO;  Location: [facility];  Service: Pain Management;  Laterality: N/A;    EPIDURAL STEROID INJECTION INTO CERVICAL SPINE N/A 08/14/2024    Procedure: Injection-steroid-epidural-cervical  C7/T1;  Surgeon: Iván Qureshi MD;  Location: [facility] OR;  Service: Pain Management;  Laterality: N/A;    ESOPHAGEAL DILATION N/A 05/20/2022    Procedure: DILATION, ESOPHAGUS;  Surgeon: Jose Aguilera MD;  Location: [facility];  Service: Endoscopy;  Laterality: N/A;  [place]    ESOPHAGOGASTRODUODENOSCOPY N/A 05/20/2022    Procedure: EGD (ESOPHAGOGASTRODUODENOSCOPY);  Surgeon: Jose Aguilera MD;  Location: [facility];  Service: Endoscopy;  Laterality: N/A;    HYSTEROSCOPY WITH DILATION AND CURETTAGE OF UTERUS N/A 10/20/2023    Procedure: HYSTEROSCOPY, WITH DILATION AND CURETTAGE OF UTERUS;  Surgeon: Noa Farias MD;  Location: [facility];  Service: OB/GYN;  Laterality: N/A;    REMOVAL-MASS      of thyroid    ROBOTIC ARTHROPLASTY, KNEE Left 8/20/2024    Procedure: ROBOTIC ARTHROPLASTY, KNEE, TOTAL;  Surgeon: Brian Austin MD;  Location:  [facility] OR;  Service: Orthopedics;  Laterality: Left;  [place]    ROBOTIC ARTHROPLASTY, KNEE Right 10/22/2024    Procedure: ROBOTIC ARTHROPLASTY, KNEE, TOTAL;  Surgeon: Brian Austin MD;  Location: [facility] OR;  Service: Orthopedics;  Laterality: Right;  [place]    TUBAL LIGATION         Current Outpatient Medications   Medication Sig    acetaminophen (TYLENOL) 500 MG tablet Take 2 tablets (1,000 mg total) by mouth every 8 (eight) hours as needed for Pain.    aspirin (ECOTRIN) 81 MG EC tablet Take 1 tablet (81 mg total) by mouth 2 (two) times daily.    calcium carbonate (OS-ADALBERTO) 500 mg calcium (1,250 mg) chewable tablet Take 1 tablet by mouth every morning.    ergocalciferol (ERGOCALCIFEROL) 50,000 unit Cap Take 50,000 Units by mouth as needed.    famotidine (PEPCID) 20 MG tablet Take 20 mg by mouth 2 (two) times daily.    furosemide (LASIX) 40 MG tablet Take 40 mg by mouth once daily.    gabapentin (NEURONTIN) 300 MG capsule Take 1 capsule (300 mg total) by mouth 3 (three) times daily.    ibuprofen (ADVIL,MOTRIN) 600 MG tablet Take 1 tablet (600 mg total) by mouth 3 (three) times daily.    ibuprofen (ADVIL,MOTRIN) 600 MG tablet Take 1 tablet (600 mg total) by mouth 3 (three) times daily as needed for Pain.    multivit-min/iron/folic acid/K (BARIATRIC MULTIVITAMINS ORAL) Take by mouth.    ondansetron (ZOFRAN) 4 MG tablet Take 1 tablet (4 mg total) by mouth every 6 (six) hours as needed for Nausea.    oxyCODONE-acetaminophen (PERCOCET) 5-325 mg per tablet Take 1 tablet by mouth every 6 (six) hours as needed for Pain.    oxyCODONE-acetaminophen (PERCOCET) 5-325 mg per tablet Take 1 tablet by mouth every 6 (six) hours as needed for Pain.    potassium chloride SA (K-DUR,KLOR-CON) 20 MEQ tablet potassium chloride ER 20 mEq tablet,extended release(part/cryst)   TAKE ONE TABLET BY MOUTH ONCE DAILY    vitamin D (VITAMIN D3) 1000 units Tab Take 2 tablets by mouth every morning.     No current facility-administered  medications for this visit.     Facility-Administered Medications Ordered in Other Visits   Medication    dextrose 50% injection 12.5 g    dextrose 50% injection 25 g    insulin regular injection 0-8 Units    lactated ringers infusion    LIDOcaine (PF) 10 mg/ml (1%) injection 10 mg       Review of patient's allergies indicates:  No Known Allergies    Family History   Problem Relation Name Age of Onset    Thyroid cancer Mother      Cancer Mother          breast    Dementia Mother      Heart disease Father      Tremor Father      Pacemaker/defibrilator Father      Thyroid cancer Sister      Ovarian cancer Maternal Aunt          20's    Cancer Maternal Grandmother      Colon cancer Neg Hx         Social History     Socioeconomic History    Marital status:    Tobacco Use    Smoking status: Never    Smokeless tobacco: Never   Substance and Sexual Activity    Alcohol use: Never    Drug use: Never    Sexual activity: Yes     Partners: Male     Birth control/protection: None     Social Drivers of Health     Financial Resource Strain: High Risk (3/25/2024)    Overall Financial Resource Strain (CARDIA)     Difficulty of Paying Living Expenses: Hard   Food Insecurity: Food Insecurity Present (3/25/2024)    Hunger Vital Sign     Worried About Running Out of Food in the Last Year: Often true     Ran Out of Food in the Last Year: Often true   Transportation Needs: No Transportation Needs (3/25/2024)    PRAPARE - Transportation     Lack of Transportation (Medical): No     Lack of Transportation (Non-Medical): No   Physical Activity: Insufficiently Active (3/25/2024)    Exercise Vital Sign     Days of Exercise per Week: 1 day     Minutes of Exercise per Session: 10 min   Stress: Stress Concern Present (3/25/2024)    [employer] [place] [employer] - Occupational Stress Questionnaire     Feeling of Stress : To some extent   Housing Stability: High Risk (3/25/2024)    Housing Stability Vital Sign     Unable to Pay for  Housing in the Last Year: Yes     Number of Places Lived in the Last Year: 1     Unstable Housing in the Last Year: No       Chief Complaint:   No chief complaint on file.      Date of surgery:  October 22, 2024    History of present illness:  59-year-old female underwent right robotic assisted total knee arthroplasty.  She is doing well.  Pain is a 0/10.  Doing outpatient physical therapy.  Doing very well.      Review of Systems:    Musculoskeletal:  See HPI        Physical Examination:    Vital Signs:  There were no vitals filed for this visit.    There is no height or weight on file to calculate BMI.    This a well-developed, well nourished patient in no acute distress.  They are alert and oriented and cooperative to examination.  Pt. walks without an antalgic gait.      Examination of the right knee shows healed surgical incision.  No erythema drainage.  No significant ecchymosis or swelling.  Range of motion is 0-110 degrees already.  No calf pain.    X-rays:  X-rays of  both knees are ordered and which show well-aligned bilateral total knee arthroplasty without complication     Assessment::Status post right robotic assisted total knee arthroplasty    Plan:  Continue exercises and stretches.  Follow up for annual exam in 9 months.  X-rays of both knees at that time.    This note was created using [employer] voice recognition software that occasionally misinterpreted phrases or words.

## 2025-01-16 ENCOUNTER — PATIENT MESSAGE (OUTPATIENT)
Dept: ORTHOPEDICS | Facility: CLINIC | Age: 60
End: 2025-01-16
Payer: MEDICAID

## 2025-01-29 ENCOUNTER — PATIENT MESSAGE (OUTPATIENT)
Dept: ORTHOPEDICS | Facility: CLINIC | Age: 60
End: 2025-01-29
Payer: MEDICAID

## 2025-03-05 ENCOUNTER — OFFICE VISIT (OUTPATIENT)
Dept: ORTHOPEDICS | Facility: CLINIC | Age: 60
End: 2025-03-05
Payer: MEDICAID

## 2025-03-05 VITALS — HEIGHT: 69 IN | BODY MASS INDEX: 33.76 KG/M2 | WEIGHT: 227.94 LBS

## 2025-03-05 DIAGNOSIS — G56.01 CARPAL TUNNEL SYNDROME ON RIGHT: Primary | ICD-10-CM

## 2025-03-05 PROCEDURE — 1159F MED LIST DOCD IN RCRD: CPT | Mod: CPTII,,, | Performed by: ORTHOPAEDIC SURGERY

## 2025-03-05 PROCEDURE — 99213 OFFICE O/P EST LOW 20 MIN: CPT | Mod: PBBFAC,PO | Performed by: ORTHOPAEDIC SURGERY

## 2025-03-05 PROCEDURE — 3008F BODY MASS INDEX DOCD: CPT | Mod: CPTII,,, | Performed by: ORTHOPAEDIC SURGERY

## 2025-03-05 PROCEDURE — 99213 OFFICE O/P EST LOW 20 MIN: CPT | Mod: S$PBB,,, | Performed by: ORTHOPAEDIC SURGERY

## 2025-03-05 PROCEDURE — 99999 PR PBB SHADOW E&M-EST. PATIENT-LVL III: CPT | Mod: PBBFAC,,, | Performed by: ORTHOPAEDIC SURGERY

## 2025-03-05 NOTE — PROGRESS NOTES
Ms Hardy returns to clinic today.  Has a history of right carpal tunnel syndrome.  She did have a carpal tunnel release.  She was having symptoms concerning for possible recurrence of her carpal tunnel.  An injection was placed to her carpal tunnel at the last visit.  She states that she did not have significant improvement in her symptoms with the injection.    Physical exam: Examination of the right hand and wrist reveals that there is no edema.  He does have a well-healed incision.  Palpation does not produce tenderness.  Has grossly intact sensation in the median radial ulnar distribution.  She has a mildly positive Tinel's and mildly positive Durkan's test over wrist.  She does have 5/5 thenar muscular strength.  Has a 2+ radial pulse     Assessment:  Peripheral neuropathy versus cervical radiculopathy versus recurrent carpal tunnel syndrome     Plan:     1. She did not have response to the injection and therefore I would like to hold off on surgical intervention until we can rule out the other causes for possible numbness and tingling.  She is going to see back and spine tomorrow for further assessment.      2. If she continues to have carpal tunnel symptoms without other peripheral or cervical radiculopathy I may discuss possibility of revision release at that time

## 2025-03-06 ENCOUNTER — OFFICE VISIT (OUTPATIENT)
Dept: NEUROSURGERY | Facility: CLINIC | Age: 60
End: 2025-03-06
Payer: MEDICAID

## 2025-03-06 ENCOUNTER — HOSPITAL ENCOUNTER (OUTPATIENT)
Dept: RADIOLOGY | Facility: HOSPITAL | Age: 60
Discharge: HOME | End: 2025-03-06
Attending: PHYSICIAN ASSISTANT
Payer: MEDICAID

## 2025-03-06 VITALS
RESPIRATION RATE: 18 BRPM | HEIGHT: 69 IN | BODY MASS INDEX: 33.76 KG/M2 | WEIGHT: 227.94 LBS | HEART RATE: 59 BPM | SYSTOLIC BLOOD PRESSURE: 151 MMHG | DIASTOLIC BLOOD PRESSURE: 88 MMHG

## 2025-03-06 DIAGNOSIS — M54.50 LUMBAR BACK PAIN: ICD-10-CM

## 2025-03-06 DIAGNOSIS — M54.50 LUMBAR BACK PAIN: Primary | ICD-10-CM

## 2025-03-06 PROCEDURE — 1160F RVW MEDS BY RX/DR IN RCRD: CPT | Mod: CPTII,,, | Performed by: PHYSICIAN ASSISTANT

## 2025-03-06 PROCEDURE — 99213 OFFICE O/P EST LOW 20 MIN: CPT | Mod: S$PBB,,, | Performed by: PHYSICIAN ASSISTANT

## 2025-03-06 PROCEDURE — 3079F DIAST BP 80-89 MM HG: CPT | Mod: CPTII,,, | Performed by: PHYSICIAN ASSISTANT

## 2025-03-06 PROCEDURE — 72114 X-RAY EXAM L-S SPINE BENDING: CPT | Mod: 26,,, | Performed by: RADIOLOGY

## 2025-03-06 PROCEDURE — 3008F BODY MASS INDEX DOCD: CPT | Mod: CPTII,,, | Performed by: PHYSICIAN ASSISTANT

## 2025-03-06 PROCEDURE — 3077F SYST BP >= 140 MM HG: CPT | Mod: CPTII,,, | Performed by: PHYSICIAN ASSISTANT

## 2025-03-06 PROCEDURE — 1159F MED LIST DOCD IN RCRD: CPT | Mod: CPTII,,, | Performed by: PHYSICIAN ASSISTANT

## 2025-03-06 PROCEDURE — 72114 X-RAY EXAM L-S SPINE BENDING: CPT | Mod: TC,FY,PO

## 2025-03-06 NOTE — PROGRESS NOTES
[facility] Established Patient      PCP: Wagner Rondon MD    CC:   Chief Complaint   Patient presents with    Cervical Spine Pain (C-spine)      She is known to me through pain management clinic.     HPI:   Mignon Hardy is a 59 y.o. year old female patient who has a past medical history of Arthritis, Chronic bilateral low back pain with left-sided sciatica, Digestive disorder, Essential tremor, Heart disease, Hypertension, Obesity, and Thyroid disease.     Ms. Crow presents for follow up of neck and back pain.   She underwent C5/6 ACDF in 1996.  She has had greater than 1 year pain in the left neck to the insterscapular region and shoulder associated with left elbow and wrist region pain.   Her cervical radiculopathy associated with adjacent level disease at C6/7 greater than C4/5.  She had C7/T1 KAILEE 8-14-24.  After the injection, she felt about 50% relief of neck pain and left elbow/ wrist pain completely resolved.  She continues with no left arm pain.  However, neck pain persist as tightness and burning; it increases with riding in a truck too long.  For one month, she has also experienced pain in the right shoulder greater than arm/ hand with lifting or raising the right arm.  She also has lower back pain and stiffness and tightness.  Back pain increases with standing or sitting too long.  In addition if she sits long back pain radiates to the right buttock and posterior thigh.  She has tried tylenol, gabapentin, advil.  She did PT for knee pain in the past and discussed some neck and back treatments with PT, but no dedicated neck or back PT.     HPI from 9-5-24 pain management appt:  She presents in referral from Alexandria Rollins PA-C for neck pain after undergoing KAILEE.  She underwent C5/6 ACDF in 1996.  She has had greater than 1 year pain in the left neck to the insterscapular region and shoulder.  It was associated with left elbow and wrist region pain.  She was seen by neurosurgery who recommended  C7/T1 KAILEE for cervical radiculopathy associated with adjacent level disease at C6/7 greater than C4/5.  She had C7/T1 KAILEE 8-14-24.  After the injection, she felt about 50% relief of neck pain and left elbow/ wrist pain completely resolved.  However, after riding in a vehicle for some time yesterday, she has felt increased neck pain/ burning sensation today.      She had left knee replacement 2 weeks ago and is undergoing PT 3 days per week.  In addition, she reports chornic intermittent lumbar back pain bilaterally, described as tightness.  It increases with standing.  If sitting for an extended period of time then she feels right posterior thigh pain as well. She has never had PT or other treatments for back pain.    Denies bowel/ bladder incontinence.    Past and current medications:  Antineuropathics: gabapentin  NSAIDs: advil  Antidepressants:  Muscle relaxers:  Opioids: percocet  Antiplatelets/Anticoagulants:  Others:  tylenol    Physical Therapy/ Chiropractic care:  PT for knee pain in the past and discussed some neck and back treatments with PT, but no dedicated neck or back PT.         Pain Intervention History:  6-7-23   C7/T1 KAILEE with Aileen Ocampo DO  8-14-24 C7/T1 KAILEE with Dr. Qureshi - 50% improvement of neck pain; resolution of left elbow, wrist pain.    Past Spine Surgical History:  C5/6 ACDF in 1996        History:    Current Outpatient Medications:     acetaminophen (TYLENOL) 500 MG tablet, Take 2 tablets (1,000 mg total) by mouth every 8 (eight) hours as needed for Pain., Disp: 30 tablet, Rfl: 0    calcium carbonate (OS-ADALBERTO) 500 mg calcium (1,250 mg) chewable tablet, Take 1 tablet by mouth every morning., Disp: , Rfl:     ergocalciferol (ERGOCALCIFEROL) 50,000 unit Cap, Take 50,000 Units by mouth as needed., Disp: , Rfl:     famotidine (PEPCID) 20 MG tablet, Take 20 mg by mouth 2 (two) times daily., Disp: , Rfl:     furosemide (LASIX) 40 MG tablet, Take 40 mg by mouth once daily., Disp: , Rfl:      gabapentin (NEURONTIN) 300 MG capsule, Take 1 capsule (300 mg total) by mouth 3 (three) times daily., Disp: 90 capsule, Rfl: 0    ibuprofen (ADVIL,MOTRIN) 600 MG tablet, Take 1 tablet (600 mg total) by mouth 3 (three) times daily., Disp: 30 tablet, Rfl: 0    ibuprofen (ADVIL,MOTRIN) 600 MG tablet, Take 1 tablet (600 mg total) by mouth 3 (three) times daily as needed for Pain., Disp: 30 tablet, Rfl: 0    multivit-min/iron/folic acid/K (BARIATRIC MULTIVITAMINS ORAL), Take by mouth., Disp: , Rfl:     ondansetron (ZOFRAN) 4 MG tablet, Take 1 tablet (4 mg total) by mouth every 6 (six) hours as needed for Nausea., Disp: 30 tablet, Rfl: 0    potassium chloride SA (K-DUR,KLOR-CON) 20 MEQ tablet, potassium chloride ER 20 mEq tablet,extended release(part/cryst)  TAKE ONE TABLET BY MOUTH ONCE DAILY, Disp: , Rfl:     vitamin D (VITAMIN D3) 1000 units Tab, Take 2 tablets by mouth every morning., Disp: , Rfl:     aspirin (ECOTRIN) 81 MG EC tablet, Take 1 tablet (81 mg total) by mouth 2 (two) times daily. (Patient not taking: Reported on 3/6/2025), Disp: 56 tablet, Rfl: 0    oxyCODONE-acetaminophen (PERCOCET) 5-325 mg per tablet, Take 1 tablet by mouth every 6 (six) hours as needed for Pain. (Patient not taking: Reported on 3/6/2025), Disp: 28 tablet, Rfl: 0    oxyCODONE-acetaminophen (PERCOCET) 5-325 mg per tablet, Take 1 tablet by mouth every 6 (six) hours as needed for Pain. (Patient not taking: Reported on 3/6/2025), Disp: 28 tablet, Rfl: 0  No current facility-administered medications for this visit.    Facility-Administered Medications Ordered in Other Visits:     dextrose 50% injection 12.5 g, 12.5 g, Intravenous, PRN, Cassandra Thompsno MD    dextrose 50% injection 25 g, 25 g, Intravenous, PRN, Cassandra Thompson MD    insulin regular injection 0-8 Units, 0-8 Units, Intravenous, PRN, Cassandra Thompson MD    lactated ringers infusion, , Intravenous, Continuous, Cassandra Thompson MD, New Bag at 10/20/23 8094     LIDOcaine (PF) 10 mg/ml (1%) injection 10 mg, 1 mL, Intradermal, Once, Cassandra Thompson MD    Past Medical History:   Diagnosis Date    Arthritis     Bilateral Knee Arthritis    Chronic bilateral low back pain with left-sided sciatica 02/10/2021    Digestive disorder     Essential tremor 02/10/2021    Heart disease     Hypertension     Obesity     Thyroid disease     NODULES       Past Surgical History:   Procedure Laterality Date    CARPAL TUNNEL RELEASE Right 05/09/2023    Procedure: Right carpal tunnel release;  Surgeon: Kash Sahni MD;  Location: [facility] OR;  Service: Orthopedics;  Laterality: Right;    CARPAL TUNNEL RELEASE Left 08/03/2023    Procedure: Left carpal tunnel release;  Surgeon: Kash Sahni MD;  Location: [facility] OR;  Service: Orthopedics;  Laterality: Left;    CERVICAL DISCECTOMY      EPIDURAL STEROID INJECTION INTO CERVICAL SPINE N/A 06/07/2023    Procedure: Injection-steroid-epidural-cervical C7-T1;  Surgeon: Aileen Ocampo DO;  Location: [facility] PAIN [facility]EMENT;  Service: Pain Management;  Laterality: N/A;    EPIDURAL STEROID INJECTION INTO CERVICAL SPINE N/A 08/14/2024    Procedure: Injection-steroid-epidural-cervical  C7/T1;  Surgeon: Iván Qureshi MD;  Location: [facility] OR;  Service: Pain Management;  Laterality: N/A;    ESOPHAGEAL DILATION N/A 05/20/2022    Procedure: DILATION, ESOPHAGUS;  Surgeon: Jose Aguilera MD;  Location: [facility];  Service: Endoscopy;  Laterality: N/A;  [place]    ESOPHAGOGASTRODUODENOSCOPY N/A 05/20/2022    Procedure: EGD (ESOPHAGOGASTRODUODENOSCOPY);  Surgeon: Jose Aguilera MD;  Location: [facility];  Service: Endoscopy;  Laterality: N/A;    HYSTEROSCOPY WITH DILATION AND CURETTAGE OF UTERUS N/A 10/20/2023    Procedure: HYSTEROSCOPY, WITH DILATION AND CURETTAGE OF UTERUS;  Surgeon: Noa Farias MD;  Location: [facility];  Service: OB/GYN;  Laterality: N/A;    REMOVAL-MASS      of thyroid    ROBOTIC ARTHROPLASTY, KNEE Left 8/20/2024     Procedure: ROBOTIC ARTHROPLASTY, KNEE, TOTAL;  Surgeon: Brian Austin MD;  Location: [facility] OR;  Service: Orthopedics;  Laterality: Left;  [place]    ROBOTIC ARTHROPLASTY, KNEE Right 10/22/2024    Procedure: ROBOTIC ARTHROPLASTY, KNEE, TOTAL;  Surgeon: Brian Austin MD;  Location: [facility] OR;  Service: Orthopedics;  Laterality: Right;  armando    TUBAL LIGATION         Family History   Problem Relation Name Age of Onset    Thyroid cancer Mother      Cancer Mother          breast    Dementia Mother      Heart disease Father      Tremor Father      Pacemaker/defibrilator Father      Thyroid cancer Sister      Ovarian cancer Maternal Aunt          20's    Cancer Maternal Grandmother      Colon cancer Neg Hx         Social History     Socioeconomic History    Marital status:    Tobacco Use    Smoking status: Never    Smokeless tobacco: Never   Substance and Sexual Activity    Alcohol use: Never    Drug use: Never    Sexual activity: Yes     Partners: Male     Birth control/protection: None     Social Drivers of Health     Financial Resource Strain: High Risk (3/25/2024)    Overall Financial Resource Strain (CARDIA)     Difficulty of Paying Living Expenses: Hard   Food Insecurity: Food Insecurity Present (3/25/2024)    Hunger Vital Sign     Worried About Running Out of Food in the Last Year: Often true     Ran Out of Food in the Last Year: Often true   Transportation Needs: No Transportation Needs (3/25/2024)    PRAPARE - Transportation     Lack of Transportation (Medical): No     Lack of Transportation (Non-Medical): No   Physical Activity: Insufficiently Active (3/25/2024)    Exercise Vital Sign     Days of Exercise per Week: 1 day     Minutes of Exercise per Session: 10 min   Stress: Stress Concern Present (3/25/2024)    [employer] - Occupational Stress Questionnaire     Feeling of Stress : To some extent   Housing Stability: High Risk (3/25/2024)    Housing  "Stability Vital Sign     Unable to Pay for Housing in the Last Year: Yes     Number of Places Lived in the Last Year: 1     Unstable Housing in the Last Year: No       Review of patient's allergies indicates:  No Known Allergies    Labs:  Lab Results   Component Value Date    HGBA1C 5.5 12/12/2024       Lab Results   Component Value Date    WBC 5.31 10/17/2024    HGB 11.7 (L) 10/17/2024    HCT 37.2 10/17/2024    MCV 96 10/17/2024     10/17/2024           Review of Systems:  Neck pain.  Low back pain. Left knee pain  shoulder pain.  Right hand tremor. Balance of review of systems is negative.    Physical Exam:  Vitals:    03/06/25 1003   BP: (!) 151/88   Pulse: (!) 59   Resp: 18   Weight: 103.4 kg (227 lb 15.3 oz)   Height: 5' 9" (1.753 m)   PainSc:   8   PainLoc: Neck     Body mass index is 33.66 kg/m².    Pain disability index:      10/31/2024     1:35 PM 9/5/2024     8:21 AM 7/17/2023     1:26 PM   Last 3 PDI Scores   Pain Disability Index (PDI) 40 35 55       Gen: NAD  Psych: mood appropriate for given condition  HEENT: eyes anicteric   CV: RRR, 2+ radial pulse  Respiratory: non-labored, no signs of respiratory distress  Abd: non-distended  Skin: warm, dry and intact.  Gait: Antalgic gait; knee surgery 2 weeks ago; using walker for ambulatory assistance.      Cervical spine: ROM is full in flexion, extension and lateral rotation without increased pain.  Spurling's maneuver causes no neck pain to either side.  Myofascial exam: Tenderness to palpation across cervical paraspinous region bilaterally.  Right shoulder pain with rotatation of the right shoulder.     Lumbar spine:  Lumbar spine: ROM is full with flexion extension and oblique extension with no increased pain.    Yonny's test causes no increased pain on either side.    Supine straight leg raise is negative bilaterally.    Internal and external rotation of the hip causes no increased pain on either side.  Myofascial exam: No tenderness to palpation " across lumbar paraspinous muscles. No tenderness to palpation over the bilateral greater trochanters and bilateral SI joint    Sensory:  Intact and symmetrical to light touch in C4-T1 dermatomes bilaterally. Intact and symmetrical to light touch in L1-S1 dermatomes bilaterally.    Motor:    Right Left   C4 Shoulder Abduction  4  5   C5 Elbow Flexion    5  5   C6 Wrist Extension  5  5   C7 Elbow Extension   5  5   C8/T1 Hand Intrinsics   5  5        Right Left   L2/3 Iliacus Hip flexion  5  5   L3/4 Qudratus Femoris Knee Extension  5  5   L4/5 Tib Anterior Ankle Dorsiflexion   5  5   L5/S1 Extensor Hallicus Longus Great toe extension  5  5   S1/S2 Gastroc/Soleus Plantar Flexion  5  5      Right Left   Triceps DTR 2+ 2+   Biceps DTR 2+ 2+   Brachioradialis DTR 2+ 2+   Patellar DTR 2+ 2+   Achilles DTR 2+ 2+   Cueto Absent  Absent   Clonus Absent Absent   Babinski Absent Absent       Imaging:    MRI cervical spine from 7-11-24:  COMPARISON:  MRI cervical spine without contrast, 03/30/2023.     FINDINGS:  CORD: Normal size and signal.  No syrinx.  Cervicomedullary junction is normal.  ALIGNMENT: Trace retrolisthesis of C6 on C7.  Reversal of the normal lordotic curvature.  Lateral masses of C1 and C2 are congruent.  BONES: Solid osseous fusion of the C5 and 6 vertebral bodies.  Vertebral body heights are maintained.  No aggressive bone marrow signal.  PARASPINAL AREA: Normal.  OTHER: 2.1 x 1.1 cm likely left thyroid nodule (series 6, image 16).  This is similar in size compared to prior study from 03/30/2023.     CERVICAL DISC LEVELS:  C2-C3: Mild disc osteophyte complex.  Preserved ventral and dorsal CSF surrounding the cord.  No significant foraminal stenosis.  C3-C4: Mild disc osteophyte complex with prominent bilateral uncovertebral spurring.  Mild right and minimal left facet hypertrophy.  Slight ventral cord flattening with thin sliver preserved ventral and preserved dorsal CSF surrounding the cord, similar to  prior study.  Moderate-severe bilateral foraminal stenosis, unchanged.  C4-C5: Mild disc osteophyte complex.  Mild bilateral facet hypertrophy.  Prominent right greater than left uncovertebral spurring.  Mild cord flattening with thin sliver preserved ventral and preserved dorsal CSF surrounding the cord, similar to prior study.  Moderate right and mild left foraminal stenosis, unchanged.  C5-C6: Fused.  Mild posterior osseous ridging.  Slight ventral cord flattening with preserved ventral and dorsal CSF surrounding the cord, unchanged.  No significant foraminal stenosis.  C6-C7: Trace retrolisthesis.  Mild-moderate disc osteophyte complex with prominent bilateral uncovertebral spurring.  Ligamentum flavum thickening.  Mild right facet hypertrophy.  Preserved ventral and dorsal CSF surrounding the cord.  Moderate-severe bilateral foraminal stenosis, unchanged.  C7-T1: Mild disc osteophyte complex.  Ligamentum flavum thickening. Mild bilateral facet hypertrophy.  Preserved ventral and dorsal CSF surrounding the cord.  No significant foraminal stenosis.      Assessment:   Mignon Hardy is a 59 y.o. year old female patient who has a past medical history of Arthritis, Chronic bilateral low back pain with left-sided sciatica, Digestive disorder, Essential tremor, Heart disease, Hypertension, Obesity, and Thyroid disease. She presents for neck and left arm pain.      MRI cervical spine personally reviewed.  Postoperative changes at C5-6 with anterior cervical fusion.  Reversal of cervical lordosis.  At C4-5 there is disc osteophyte complex and facet hypertrophy resulting in moderate right, mild left foraminal narrowing.  C6-7 disc osteophyte complex and facet hypertrophy results in moderate to severe bilateral foraminal narrowing.  There is flattening of the anterior spinal cord without cord compression or cord signal change.    Ms. Crow has neck pain and right C5 radiculopathy due to adjacent level disease at C4/5  with right greater than left foraminal narrowing.  History of C5-6 anterior fusion.  Priror left C7 radiculopathy from C6/7 adjacent level disease improved.  Also possible right shoulder impingement as source of right shoulder pain worsened with shoulder rotation.  Left Arm pain previoulsy improved after epidural steroid injection.  he has chronic lower back pain with occasional right thigh radicular pain.  Lower back pain may be more myofascial in nature but can not rule out true lumbar radiculopathy.     Plan:  - again discussed the role of medications, additional trial of physical therapy for both the neck and lower back, further cervical KAILEE to help with pain. We could not consider any lumbar KAILEE procedures until an MRI is complete.   - xray lumbar spine to assess alignment and degree of degenerative changes - will notify with results.  - she would like to know results of lumbar xray before further considering PT for neck and lower back.      Problem List Items Addressed This Visit    None  Visit Diagnoses         Lumbar back pain    -  Primary    Relevant Orders    X-Ray Lumbar Complete Including Flex And Ext (Completed)              Follow Up: will notify with xray results.     : Reviewed and consistent with medication use as prescribed.          Sandra Guadalupe PA-C  [facility]

## 2025-03-07 ENCOUNTER — RESULTS FOLLOW-UP (OUTPATIENT)
Dept: PAIN MEDICINE | Facility: CLINIC | Age: 60
End: 2025-03-07
Payer: MEDICAID

## 2025-03-19 ENCOUNTER — TELEPHONE (OUTPATIENT)
Dept: PAIN MEDICINE | Facility: CLINIC | Age: 60
End: 2025-03-19
Payer: MEDICAID

## 2025-03-19 NOTE — TELEPHONE ENCOUNTER
----- Message from Ysabel sent at 3/19/2025 10:13 AM CDT -----  Regarding: NP PHYSICAL THERAPY TASH  Good morning,The Physical/Occupational Therapy department received your order to get the attached patient scheduled for an evaluation. We have reached out to the patient to schedule their evaluation; however, we have been unsuccessful in reaching them.  We wanted you to be aware that your patient has not started therapy. If you speak with them again about starting therapy please have them reach out to us at [phone] to get scheduled. Sincerely,Rebecca Dodge

## 2025-04-02 ENCOUNTER — CLINICAL SUPPORT (OUTPATIENT)
Dept: REHABILITATION | Facility: HOSPITAL | Age: 60
End: 2025-04-02
Payer: MEDICAID

## 2025-04-02 DIAGNOSIS — M54.2 CERVICALGIA: ICD-10-CM

## 2025-04-02 DIAGNOSIS — G89.29 CHRONIC LOW BACK PAIN, UNSPECIFIED BACK PAIN LATERALITY, UNSPECIFIED WHETHER SCIATICA PRESENT: ICD-10-CM

## 2025-04-02 DIAGNOSIS — M54.50 CHRONIC LOW BACK PAIN, UNSPECIFIED BACK PAIN LATERALITY, UNSPECIFIED WHETHER SCIATICA PRESENT: ICD-10-CM

## 2025-04-02 DIAGNOSIS — M54.2 NECK PAIN: Primary | ICD-10-CM

## 2025-04-02 DIAGNOSIS — M47.812 CERVICAL SPONDYLOSIS: ICD-10-CM

## 2025-04-02 DIAGNOSIS — M54.50 LUMBAR BACK PAIN: ICD-10-CM

## 2025-04-02 PROCEDURE — 97162 PT EVAL MOD COMPLEX 30 MIN: CPT | Mod: PO

## 2025-04-02 NOTE — PROGRESS NOTES
Outpatient Rehab    Physical Therapy Evaluation    Patient Name: Mignon Hardy  MRN: [id number]  YOB: 1965  Encounter Date: 4/2/2025    Therapy Diagnosis:   Encounter Diagnoses   Name Primary?    Cervical spondylosis     Lumbar back pain     Cervicalgia     Neck pain Yes    Chronic low back pain, unspecified back pain laterality, unspecified whether sciatica present      Physician: Sandra Guadalupe PA-C    Physician Orders: Eval and Treat  Medical Diagnosis: Cervical spondylosis  Lumbar back pain  Cervicalgia    Visit # / Visits Authorized:  1 / 1  Insurance Authorization Period: 3/11/2025 to 3/11/2026  Date of Evaluation: 4/2/2025  Plan of Care Certification: 4/2/2025 to 4/15/2025  Time In: 1300   Time Out: 1400  Total Time: 60   Total Billable Time: 60    Intake Outcome Measure for FOTO Survey    Therapist reviewed FOTO scores for Mignon Hardy on 4/2/2025.   FOTO report - see Media section or FOTO account episode details.     Intake Score:  %         Subjective   History of Present Illness  Mignon is a 59 y.o. female who reports to physical therapy with a chief concern of Neck and lower back pain.       Patient reports a surgery of M47.812 (ICD-10-CM) - Cervical spondylosis  M54.50 (ICD-10-CM) - Lumbar back pain  M54.2 (ICD-10-CM) - Cervicalgia.  Diagnostic tests related to this condition: MRI studies.   MRI Studies Details: 6/19/2024EXAMINATION:  MRI CERVICAL SPINE WITHOUT CONTRAST     CLINICAL HISTORY:  Neck pain, chronic, degenerative changes on X-ray; arthrodesis status.  Neck pain and weakness.     TECHNIQUE:  Multiplanar, multisequence MR images of the cervical spine were acquired without the administration of contrast.     COMPARISON:  MRI cervical spine without contrast, 03/30/2023.  Impression:     1. Multilevel spondylosis, similar to prior study and greatest at the C4-5 level where there is mild cord flattening with preserved CSF surrounding the cord and no cord signal  abnormality.  Slight cord flattening at C3-4 and C5-6 with preserved CSF surrounding cord and no cord signal abnormality.  2. Multilevel foraminal stenosis, similar to prior study in greatest bilaterally at C3-4 and C6-7 where it is moderate-severe.  3. 2.1 cm left thyroid nodule, similar to prior study from 03/30/2023.  Correlate with any prior thyroid ultrasound.  If none is available, consider nonemergent outpatient thyroid ultrasound for further evaluation.    History of Present Condition/Illness: Pt reports history of ACDF C5/C6 back in the 90's with resolution of left arm pain for years. Years later stabbing pain in her left upper trap region returned while she was at a job that required sitting x 12 hours a day. She went to the neurologist for a tremor she has had now for three or so years which drove her to the neurologist in [place], where x rays were ordered and she was told about arthritis in her cervical spine and recommended for injection. First round of injection was 0% success in reducing pain. She was referred to pain management who requested Gabapentin.  She stayed on his med for at least a year, with continued stabbing posterior left shoulder pain. Referred to neurosurgery for MRI, did a second round of injections 30% improve in symptoms for 3-4 weeks and got off Gabapentin. Resumed Gabapentin a few months later. Currently riding in the car or driving seems to be when she most feels it. She has a tremor in her right hand that she has had for three years without a formal diagnosis. She had two TKRs in the past year (aug and oct of 2024) and weight loss surgery (Nov 2023). Pt would like to decrease the stiffness in her lower back with prolonged positioning of sitting and standing and decrease the shoot/burning pain over her left upper region with driving.     Pain     Patient reports a current pain level of 7/10. Pain at best is reported as 2/10. Pain at worst is reported as 8/10.   Location: L Upper  trap region; low thoracic upper lumbar tightness  Pain Qualities: Tightness, Burning  Pain-Relieving Factors: Activity modification, Change in position, Medications - prescription  Pain-Aggravating Factors: Computer work, Cooking, Driving, Head movements, Movement, Rotation, Sitting, Sleeping, Standing, Stress         Review of Systems  Patient reports: Osteoarthritis  Patient denies: Bladder Incontinence, Bowel Incontinence, Fever, Saddle Numbness, Weight Gain, Weight Loss, and Cancer History        Living Arrangements  Living Situation  Housing: Home independently  Living Arrangements: Spouse/significant other    Home Setup  Type of Structure: Mobile home  Home Access: Stairs with rails  Entrance Stairs - Number of Steps: 5  Entrance Stairs - Rails: Both  Number of Levels in Home: One level          Past Medical History/Physical Systems Review:   Mignon Hardy  has a past medical history of Arthritis, Chronic bilateral low back pain with left-sided sciatica, Digestive disorder, Essential tremor, Heart disease, Hypertension, Obesity, and Thyroid disease.    Mignon Hardy  has a past surgical history that includes Esophagogastroduodenoscopy (N/A, 05/20/2022); Esophageal dilation (N/A, 05/20/2022); removal-mass; Carpal tunnel release (Right, 05/09/2023); Epidural steroid injection into cervical spine (N/A, 06/07/2023); Tubal ligation; Carpal tunnel release (Left, 08/03/2023); Hysteroscopy with dilation and curettage of uterus (N/A, 10/20/2023); Epidural steroid injection into cervical spine (N/A, 08/14/2024); Cervical discectomy; robotic arthroplasty, knee (Left, 8/20/2024); and robotic arthroplasty, knee (Right, 10/22/2024).    Mignon has a current medication list which includes the following prescription(s): acetaminophen, aspirin, calcium carbonate, ergocalciferol, famotidine, furosemide, gabapentin, ibuprofen, ibuprofen, multivit-min/iron/folic acid/k, ondansetron, oxycodone-acetaminophen,  oxycodone-acetaminophen, potassium chloride sa, and vitamin d, and the following Facility-Administered Medications: dextrose 50%, dextrose 50%, insulin regular, lactated ringers, and lidocaine (pf) 10 mg/ml (1%).    Review of patient's allergies indicates:  No Known Allergies     Objective   Posture  Patient presents with a Forward head position. Flat thoracic spine is observed.     Bilateral scapulae are: Protracted              Spinal Mobility  Thoracic Mobility Details: Gross hypomobility  Lumbosacral Mobility Details: Gross hyper mobility with + prone instability test    Spinal Muscle Palpation          Left Spinal Muscle Palpation  Abnormal: Cervical/Thoracic  Left Cervical/Thoracic Muscle Palpation Observations: Tenderness to L upper trap with increased tone           Subcranial Range of Motion   Active Restricted? Passive Restricted? Pain   Flexion         Protraction         Retraction           Cervical Range of Motion   Active (deg) Passive (deg) Pain   Flexion 55       Extension 30       Right Lateral Flexion 35       Right Rotation 48       Left Lateral Flexion 25       Left Rotation 45              Shoulder Range of Motion     Shoulder, Elbow, or Forearm Range of Motion Details: Tightness of lower fibers of pec major bilaterally as well as bilateral lat muscles limiting ROM         Subcranial Strength   Strength Pain   Subcranial Flexion 4-     Subcranial Retraction 4         Cervical Strength   Strength Pain   Flexion (C1/C2) 4     Extension 5     Right Lateral Flexion (C3) 4+     Left Lateral Flexion (C3) 4+     Right Rotation 4+     Left Rotation 4+         Shoulder Strength - Planes of Motion   Right Strength Right Pain Left Strength Left  Pain   Flexion 4-   4-     Extension           ABduction 5   5     ADduction           Horizontal ABduction           Horizontal ADduction           Internal Rotation 0° 4   4     Internal Rotation 90°           External Rotation 0° 4   4     External Rotation 90°  "              Shoulder Strength - Scapular Stabilizing Muscles   Right Strength Right Pain Left Strength Left  Pain   Lower Trapezius           Middle Trapezius 3+   3     Rhomboids             Elbow Strength   Right Strength Right Pain Left Strength Left  Pain   Flexion (C6) 5   5     Extension (C7) 5   5            Wrist Strength - Planes of Motion   Right Strength Right Pain Left Strength Left  Pain   Flexion 5   5     Extension 5   5     Radial Deviation           Ulnar Deviation (C8) 5   5                   Cervical Screen  Tests  Negative: Left Spurling's A  Cervical Range of Motion           Thoracic Range of Motion             Cervical/Thoracic Special Tests            Cervical Tests  Negative: Left Spurling's A                  Treatment:  Therapeutic Activity  TA 1: LS stretch 3 x 30"  TA 2: UT stretch 3 x 30"  TA 3: lat stretch 3 x 30"  TA 4: Pec stretch 3 x 30"  TA 5: TS foam roller for extension x 15-20    Time Entry(in minutes):  PT Evaluation (Low) Time Entry: 60    Assessment & Plan   Assessment  Mignon presents with a condition of Low complexity.   Presentation of Symptoms: Stable       Functional Limitations: Activity tolerance, Driving, Range of motion, Proprioception, Performing household chores, Pain when reaching, Squatting, Standing tolerance  Impairments: Abnormal muscle firing, Abnormal muscle tone, Activity intolerance, Abnormal or restricted range of motion, Impaired physical strength, Lack of appropriate home exercise program    Patient Goal for Therapy (PT): Pt would like to decrease the stabbing pain in her L upper trap area with prolonged sitting driving and stiffness in her back with prolonged standing or sitting.  Prognosis: Fair  Assessment Details: Pt presents to PT today for evaluation of neck and low back pain and is found to have active trigger point in L upper trap with UT, levator, pec, and lat tightness, weakness of posterior scapular muscles and Deep neck flexor group " suggesting postural syndrome. She had restricted Cervical rotation bilaterally and was hypomobile to PA testing of TS with + prone instability test suggesting mid back pain to arise from weakness of postural muscles as well. She will benefit from skilled PT to improve posture, flexibility and strength in hopes to reduce both neck and upper lumbar/lower thoracic paraspinal muscle pain.     Plan  From a physical therapy perspective, the patient would benefit from: Skilled Rehab Services    Planned therapy interventions include: Therapeutic exercise and Other (Comment). Functional Dry Needling for L UT  Planned modalities to include: Other (Comment).        Visit Frequency: 2 times Per Week for 6 Weeks.       This plan was discussed with Patient.   Discussion participants: Agreed Upon Plan of Care             Patient's spiritual, cultural, and educational needs considered and patient agreeable to plan of care and goals.           Goals:   Active       Long Term Goal       Pt will improve postural awareness to decrease forward shoulder, protracted head into neutral spine to improve spine health.       Start:  04/02/25    Expected End:  04/30/25            Pt will improve core strength to have a negative prone instability test at L2 to show improvement in irritability of lower back pain.       Start:  04/02/25    Expected End:  05/14/25            Pt will improve strength of mid trap to at least 4/5 bilaterally to improve posture.        Start:  04/02/25    Expected End:  05/14/25            Pt will report no increase in prolonged positioning x 1 hour.       Start:  04/02/25    Expected End:  05/14/25               Short Term Goals       Pt will return reporting a decrease in stabbing left shoulder pain from 7/10 to less than 2/10.        Start:  04/02/25    Expected End:  04/16/25            Pt will return reporting compliance with HEP in order to optimize improvements with current POC.       Start:  04/02/25     Expected End:  04/16/25                Bettie Arrieta, PT

## 2025-04-10 DIAGNOSIS — M47.812 CERVICAL SPONDYLOSIS: ICD-10-CM

## 2025-04-10 DIAGNOSIS — M54.2 CERVICALGIA: ICD-10-CM

## 2025-04-10 DIAGNOSIS — M54.50 LUMBAR BACK PAIN: ICD-10-CM

## 2025-04-11 RX ORDER — GABAPENTIN 300 MG/1
300 CAPSULE ORAL 3 TIMES DAILY
Qty: 90 CAPSULE | Refills: 0 | Status: SHIPPED | OUTPATIENT
Start: 2025-04-11

## 2025-05-13 ENCOUNTER — PATIENT MESSAGE (OUTPATIENT)
Dept: ORTHOPEDICS | Facility: CLINIC | Age: 60
End: 2025-05-13
Payer: MEDICAID

## 2025-05-14 DIAGNOSIS — M54.2 CERVICALGIA: ICD-10-CM

## 2025-05-14 DIAGNOSIS — M47.812 CERVICAL SPONDYLOSIS: ICD-10-CM

## 2025-05-14 DIAGNOSIS — M54.50 LUMBAR BACK PAIN: ICD-10-CM

## 2025-05-15 RX ORDER — GABAPENTIN 300 MG/1
300 CAPSULE ORAL 3 TIMES DAILY
Qty: 90 CAPSULE | Refills: 0 | Status: SHIPPED | OUTPATIENT
Start: 2025-05-15

## 2025-07-07 DIAGNOSIS — M54.2 CERVICALGIA: ICD-10-CM

## 2025-07-07 DIAGNOSIS — M47.812 CERVICAL SPONDYLOSIS: ICD-10-CM

## 2025-07-07 DIAGNOSIS — M54.50 LUMBAR BACK PAIN: ICD-10-CM

## 2025-07-09 RX ORDER — GABAPENTIN 300 MG/1
300 CAPSULE ORAL 3 TIMES DAILY
Qty: 90 CAPSULE | Refills: 2 | Status: SHIPPED | OUTPATIENT
Start: 2025-07-09

## (undated) DEVICE — SOL NACL IRR 1000ML BTL

## (undated) DEVICE — COVER TABLE 44X90 STERILE

## (undated) DEVICE — TUBING SUC UNIV W/CONN 12FT

## (undated) DEVICE — SPONGE BULKEE II ABSRB 6X6.75

## (undated) DEVICE — DRAPE INCISE IOBAN 2 23X17IN

## (undated) DEVICE — DRAPE ORTH SPLIT 77X108IN

## (undated) DEVICE — GLOVE SENSICARE PI GRN 8

## (undated) DEVICE — GLOVE PROTEXIS LTX MICRO 8

## (undated) DEVICE — DRAPE U SPLIT SHEET 54X76IN

## (undated) DEVICE — BNDG COFLEX FOAM LF2 ST 6X5YD

## (undated) DEVICE — GOWN TOGA SYS PEELWY ZIP 2 XL

## (undated) DEVICE — DRAPE STERI U-SHAPED 47X51IN

## (undated) DEVICE — TRAY NERVE BLOCK

## (undated) DEVICE — KIT VIZADISC KNEE TRACKING

## (undated) DEVICE — BLADE SAG DUAL 18MMX1.27MMX90M

## (undated) DEVICE — GAUZE SPONGE 4X4 12PLY

## (undated) DEVICE — ELECTRODE REM PLYHSV RETURN 9

## (undated) DEVICE — ALCOHOL 70% ANTISEPTIC ISO 4OZ

## (undated) DEVICE — PIN BONE 3.2X110MM
Type: IMPLANTABLE DEVICE | Site: KNEE | Status: NON-FUNCTIONAL
Removed: 2024-08-20

## (undated) DEVICE — DECANTER FLUID TRNSF WHITE 9IN

## (undated) DEVICE — WRAP KNEE ACCU THERM GEL PACK

## (undated) DEVICE — DRAPE HAND STERILE

## (undated) DEVICE — CATH URETHRAL RED RUBBER 18FR

## (undated) DEVICE — GLOVE BIOGEL PI MICRO SZ 8

## (undated) DEVICE — BLADE SURG CARBON STEEL SZ11

## (undated) DEVICE — NDL TUOHY EPIDURAL 20G X 3.5

## (undated) DEVICE — BONE PINS (3.2MM X 140MM)
Type: IMPLANTABLE DEVICE | Site: KNEE | Status: NON-FUNCTIONAL
Removed: 2024-10-22

## (undated) DEVICE — SYR 10CC LUER LOCK

## (undated) DEVICE — TAPE SILK 3IN

## (undated) DEVICE — KIT CHECKPOINT MAKO

## (undated) DEVICE — STRAP OR TABLE 5IN X 72IN

## (undated) DEVICE — DRESSING XEROFORM FOIL PK 1X8

## (undated) DEVICE — SPONGE LAP 18X18 PREWASHED

## (undated) DEVICE — ALCOHOL 70% ISOP RUBBING 4OZ

## (undated) DEVICE — DRAPE THREE-QTR REINF 53X77IN

## (undated) DEVICE — SUT STRATAFIX SPRL PS-2 3-0

## (undated) DEVICE — DRESSING MEPILEX 4X10IN

## (undated) DEVICE — TOWEL OR DISP STRL BLUE 4/PK

## (undated) DEVICE — PACK EXTREMITY ORTHOMAX

## (undated) DEVICE — SUT VICRYL PLUS 2-0 CT1 18

## (undated) DEVICE — GLOVE SENSICARE PI GRN 7

## (undated) DEVICE — PADDING CAST SPECIALIST 6X4YD

## (undated) DEVICE — PADDING WYTEX UNDRCST 6INX4YD

## (undated) DEVICE — DRESSING GAUZE OIL EMUL 3X8

## (undated) DEVICE — BANDAGE ESMARK ELASTIC ST 6X9

## (undated) DEVICE — GLOVE SENSICARE PI MICRO 7

## (undated) DEVICE — DRAPE STERI-DRAPE 1000 17X11IN

## (undated) DEVICE — PACK CUSTOM UNIV BASIN SLI

## (undated) DEVICE — SOL POVIDONE PREP IODINE 4 OZ

## (undated) DEVICE — KIT TRIATHLON TIBIAL SIZER

## (undated) DEVICE — CORD BIPOLAR 12 FOOT

## (undated) DEVICE — PAD CAST SPECIALIST STRL 3

## (undated) DEVICE — TOURNIQUET SB QC DP 18X4IN

## (undated) DEVICE — PAD ABDOMINAL STERILE 8X10IN

## (undated) DEVICE — WRAP DEMAYO LEG STERILE

## (undated) DEVICE — INTERPULSE SET

## (undated) DEVICE — TOURNIQUET SB QC DP 34X4IN

## (undated) DEVICE — DRESSING MEPILEX 4X12IN

## (undated) DEVICE — BANDAGE ELAS SOFTWRAP ST 3X5YD

## (undated) DEVICE — SEE L#120831

## (undated) DEVICE — KIT DRAPE RIO ONE PIECE W/POCK

## (undated) DEVICE — APPLICATOR CHLORAPREP ORN 26ML

## (undated) DEVICE — MANIFOLD 4 PORT

## (undated) DEVICE — Device

## (undated) DEVICE — NDL SPINAL 18GX3.5 SPINOCAN

## (undated) DEVICE — APPLICATOR CHLORAPREP CLR 10.5

## (undated) DEVICE — BOWL STERILE LARGE 32OZ

## (undated) DEVICE — GLOVE SENSICARE PI ALOE 6.5

## (undated) DEVICE — BLADE MAKO STANDARD

## (undated) DEVICE — NDL 27G X 1 1/4

## (undated) DEVICE — BANDAGE ACE DOUBLE STER 6IN

## (undated) DEVICE — KIT SAHARA DRAPE DRAW/LIFT

## (undated) DEVICE — SYR GLASS 5CC LUER LOK

## (undated) DEVICE — GOWN SMARTGOWN LVL4 X-LONG XL

## (undated) DEVICE — PIN FIXATION BONE 140X3.2MM
Type: IMPLANTABLE DEVICE | Site: KNEE | Status: NON-FUNCTIONAL
Removed: 2024-08-20

## (undated) DEVICE — BONE PINS (3.2MM X 110MM)
Type: IMPLANTABLE DEVICE | Site: KNEE | Status: NON-FUNCTIONAL
Removed: 2024-10-22

## (undated) DEVICE — BLADE SURG #15 CARBON STEEL

## (undated) DEVICE — BLADE SURG CARBON STEEL #10

## (undated) DEVICE — DRAPE HALF SURGICAL 40X58IN

## (undated) DEVICE — SUT ETHILON 4-0 PS2 18 BLK

## (undated) DEVICE — SYR 50CC LL

## (undated) DEVICE — ADHESIVE DERMABOND ADVANCED

## (undated) DEVICE — ALCOHOL RUBBING 70% ISO 4OZ

## (undated) DEVICE — LINER SUCTION 3000CC

## (undated) DEVICE — BANDAGE ESMARK LATEX FREE 4INX

## (undated) DEVICE — SLEEVE SCD EXPRESS KNEE MEDIUM

## (undated) DEVICE — GLOVE PROTEXIS LTX MICRO  7.5

## (undated) DEVICE — DRAPE STERI INSTRUMENT 1018

## (undated) DEVICE — KIT TRIATHLON CR TIB PREP SZ5

## (undated) DEVICE — SUT STRATAFIX PDS 1 CTX 18IN

## (undated) DEVICE — PACK SIRUS BASIC V SURG STRL

## (undated) DEVICE — GLOVE SENSICARE PI GRN 6.5

## (undated) DEVICE — FORCEP STRAIGHT DISP

## (undated) DEVICE — BAND RUBBER STERILE 1/4X3.5IN

## (undated) DEVICE — YANKAUER FLEX NO VENT HI CAP

## (undated) DEVICE — GLOVE SENSICARE PI ALOE 7.5

## (undated) DEVICE — TOGA FLYTE PEEL AWAY XLARGE

## (undated) DEVICE — GLOVE SENSICARE PI ALOE 6

## (undated) DEVICE — BLADE TONGUE DEPRESSOR STRL

## (undated) DEVICE — SOL NACL IRR 3000ML

## (undated) DEVICE — KIT TRIATHLON CR FEM PREP SZ5

## (undated) DEVICE — BANDAGE MATRIX HK LOOP 6IN 5YD

## (undated) DEVICE — SOL NORMAL USPCA 0.9%

## (undated) DEVICE — MARKER SKIN RULER STERILE

## (undated) DEVICE — BANDAGE ADHESIVE